# Patient Record
Sex: FEMALE | Race: WHITE | NOT HISPANIC OR LATINO | Employment: PART TIME | URBAN - METROPOLITAN AREA
[De-identification: names, ages, dates, MRNs, and addresses within clinical notes are randomized per-mention and may not be internally consistent; named-entity substitution may affect disease eponyms.]

---

## 2017-06-06 ENCOUNTER — GENERIC CONVERSION - ENCOUNTER (OUTPATIENT)
Dept: OTHER | Facility: OTHER | Age: 27
End: 2017-06-06

## 2017-07-18 ENCOUNTER — ALLSCRIPTS OFFICE VISIT (OUTPATIENT)
Dept: OTHER | Facility: OTHER | Age: 27
End: 2017-07-18

## 2017-08-08 ENCOUNTER — TRANSCRIBE ORDERS (OUTPATIENT)
Dept: LAB | Facility: HOSPITAL | Age: 27
End: 2017-08-08

## 2017-08-08 ENCOUNTER — APPOINTMENT (OUTPATIENT)
Dept: LAB | Facility: HOSPITAL | Age: 27
End: 2017-08-08
Payer: COMMERCIAL

## 2017-08-08 DIAGNOSIS — Z00.8 HEALTH EXAMINATION IN POPULATION SURVEY: ICD-10-CM

## 2017-08-08 DIAGNOSIS — Z00.8 HEALTH EXAMINATION IN POPULATION SURVEY: Primary | ICD-10-CM

## 2017-08-08 LAB
CHOLEST SERPL-MCNC: 181 MG/DL (ref 50–200)
EST. AVERAGE GLUCOSE BLD GHB EST-MCNC: 108 MG/DL
HBA1C MFR BLD: 5.4 % (ref 4.2–6.3)
HDLC SERPL-MCNC: 53 MG/DL (ref 40–60)
LDLC SERPL CALC-MCNC: 117 MG/DL (ref 0–100)
TRIGL SERPL-MCNC: 56 MG/DL

## 2017-08-08 PROCEDURE — 80061 LIPID PANEL: CPT

## 2017-08-08 PROCEDURE — 83036 HEMOGLOBIN GLYCOSYLATED A1C: CPT

## 2017-08-08 PROCEDURE — 36415 COLL VENOUS BLD VENIPUNCTURE: CPT

## 2018-01-10 NOTE — MISCELLANEOUS
Message   Recorded as Task   Date: 10/07/2016 02:04 PM, Created By: Tyler Upton   Task Name: Call Back   Assigned To: Sarah Steward   Regarding Patient: Elizabeth Adams, Status: In Progress   Comment:    Ale Lehman - 07 Oct 2016 2:04 PM     TASK CREATED  PT CALLED C/O RT BREAST MASTITIS SX  [phone]   Irina Weems - 07 Oct 2016 2:05 PM     TASK IN [facility] - 07 Oct 2016 2:47 PM     TASK EDITED                 pt c/o of having cold like sx since wed, had fever alst night of 101, both her children did have fevers/colds as well  states her right breast started hurting last night while is currently breast feeding, has redness and warm to touch under breast near nipple area  no fever today, consulted with CG on call, rx'd dicloxicillin sodium 500mg 4xs daily x 10 days  called into pharmacy  pt made aware  Active Problems    1  Abnormal glucose in pregnancy, antepartum (648 83) (O99 810)   2  Breech presentation, fetus 1 (652 20) (O32 1XX1)   3  Dermoid cyst of left ovary (220) (D27 1)   4  Elevated serum alkaline phosphatase level (790 5) (R74 8)   5  Nonpurulent mastitis associated with puerperium (675 24) (O91 22)   6  Ovarian cyst affecting pregnancy, antepartum (499 66,500 5) (O34 80,N83 209)   7  Pregnancy (V22 2) (Z33 1)   8  Ultrasound for  screening for fetal growth restriction (V28 4) (Z36)    Current Meds   1  Dicloxacillin Sodium 500 MG Oral Capsule; TAKE 1 CAPSULE 4 TIMES DAILY; Therapy: 29WNU2588 to (Evaluate:36Obs0748)  Requested for: 20PQD5210 Recorded   2  Prenatal Vitamins TABS; TAKE 1 TABLET DAILY; Therapy: (Recorded:25Csv9797) to Recorded    Allergies    1  No Known Drug Allergies    2   Seasonal    Signatures   Electronically signed by : Danny Casanova LPN; Oct  7 1137  2:54LQ EST                       (Author)

## 2018-01-10 NOTE — PROGRESS NOTES
2016         RE: Lenward Bowels                                 To: [facility]  MR#: [id number]                                   [address]   : [address] [facility]                                  [address]   ENC: [id number] Montano, [address]   (Exam #: [id number])                           Fax: [phone]      The LMP of this 30year old,  3, para 2 patient was SEP 10 2015,   giving her an NIKOS of 2016 and a current gestational age of 24 weeks   5 days by dates  A sonographic examination was performed on 2016   using real time equipment  The ultrasound examination was performed using   abdominal & vaginal techniques  The patient has a BMI of 24 0  Her blood   pressure today was 108/65  Earliest ultrasound found in her record: 12/02/15 11w2d 16 NIKOS      Problem list   1  First trimester abnormal uterine artery Doppler flow studies that   resolved by the second trimester  2  Possible left ovarian dermoid noted at 20 weeks        Cardiac motion was observed at 149 bpm       INDICATIONS      fetal anatomical survey   abnormal uterine Doppler   asthma      Exam Types      LEVEL II   Transvaginal      RESULTS      Fetus # 1 of 1   Transverse presentation   Fetal growth appeared normal   Placenta Location = Posterior   No placenta previa   Placenta Grade = I      MEASUREMENTS (* Included In Average GA)      OFD              5 8 cm   AC              13 7 cm        18 weeks 6 days* (34%)   BPD              4 3 cm        19 weeks 0 days* (34%)   HC              16 1 cm        18 weeks 6 days* (23%)   Femur            3 0 cm        19 weeks 2 days* (32%)      Nuchal Fold      4 0 mm      Humerus          2 9 cm        19 weeks 2 days  (43%)   Radius           2 5 cm        20 weeks 1 day   Ulna             2 8 cm        20 weeks 1 day   Tibia            2 7 cm        19 weeks 6 days  (49%)   Fibula           2 7 cm 18 weeks 6 days   Foot             3 1 cm        19 weeks 4 days      Cerebellum       2 0 cm        20 weeks 0 days   Biorbit          2 9 cm        18 weeks 5 days   CisternaMagna    3 6 mm      HC/AC           1 18   FL/AC           0 22   FL/BPD          0 69   EFW (Ac/Fl/Hc)   272 grams - 0 lbs 10 oz      THE AVERAGE GESTATIONAL AGE is 19 weeks 0 days +/- 10 days  AMNIOTIC FLUID      Largest Vertical Pocket = 4 4 cm   Amniotic Fluid: Normal      UTERINE ARTERIES                                  S/D   PI    RI    NOTCH       Left Uterine Artery        2 25  0 91  0 55   No       Right Uterine Artery       2 53  1 03  0 61   No      ANATOMY      Head                                    Normal   Face/Neck                               Normal   Th  Cav  Normal   Heart                                   Normal   Abd  Cav  Normal   Stomach                                 Normal   Right Kidney                            Normal   Left Kidney                             Normal   Bladder                                 Normal   Abd  Wall                               Normal   Spine                                   Normal   Extrems                                 Normal   Genitalia                               Normal   Placenta                                Normal   Umbl  Cord                              Normal   Uterus                                  Normal      ANATOMY DETAILS      Visualized Appearing Sonographically Normal:   HEAD: (Calvarium, BPD Level, Lateral Ventricles, Choroid Plexus,   Cerebellum, Cisterna Magna);    FACE/NECK: (Neck, Nuchal Fold, Profile,   Orbits, Nose/Lips, Palate, Face);    TH  CAV : (Diaphragm); HEART:   (Four Chamber View, Proximal Left Outflow, Proximal Right Outflow, Aortic   Arch, Ductal Arch, Short Axis of Greater Vessels, Cardiac Axis,   Interventricular Septum, Interatrial Septum, Cardiac Position);    ABD  CAV , STOMACH, RIGHT KIDNEY, LEFT KIDNEY, BLADDER, ABD  WALL, SPINE:   (Cervical Spine, Thoracic Spine, Lumbar Spine, Sacrum);    EXTREMS: (Lt   Humerus, Rt Humerus, Lt Forearm, Rt Forearm, Lt Hand, Rt Hand, Lt Femur,   Rt Femur, Lt Low Leg, Rt Low Leg, Lt Foot, Rt Foot);    GENITALIA,   PLACENTA, UMBL  CORD, UTERUS      ANATOMY COMMENTS      No fetal structural abnormality or ultrasound marker for aneuploidy is   identified on the Level II ultrasound study today  The genitalia were   reviewed and found to be  female  The patient is aware of the results of   the fetal sex  Fetal growth and amniotic fluid volume appear normal   The   maternal uterine artery Doppler study is normal   Active movement of the   fetal body & extremities was seen  There is no suspicion of a   subchorionic bleed  The placental cord insertion was normal   The cervix   seen by transvaginal scan appears normal measuring 39mms  There is no   evidence for spontaneous funneling of the cervix seen  ADNEXA      The left ovary appeared normal and measured 3 3 x 2 1 x 1 7 cm with a   volume of 6 2 cc  There is a 7 mm echogenic area within the left ovary and   which may be a small dermoid  The right ovary appeared normal and measured   3 7 x 3 1 x 2 0 cm with a volume of 12 0 cc  IMPRESSION      Osuna IUP   19 weeks and 0 days by this ultrasound  (NIKOS=JUN 21 2016)   Transverse presentation   Fetal growth appeared normal   Normal anatomy survey   Regular fetal heart rate of 149 bpm   Posterior placenta   No placenta previa      GENERAL COMMENT      The patient was informed of the findings and counseled about the   limitations of the exam in detecting all forms of fetal congenital   abnormalities  She denies any vaginal bleeding or uterine cramping/contractions  She does   feel fetal movement  Her sequential screen was normal with a one in 10,000   risk for [facility] and trisomy 18 and a one in 6000 risk for neural tube   defects  Exam shows she is comfortable and her abdomen is non tender  Follow up recommended:   1  Recommend a growth scan at 32 weeks and will review that left   ovary/possible dermoid at that time  2  She can stop her baby aspirin as her uterine Dopplers have normalized  3  She reports today that she has not required the use of an inhaler since   her last pregnancy that ended in August of 2014  SPENCER Drummond M D  Maternal-Fetal Medicine   Electronically signed 01/26/16 20:33           Electronically signed by:Danyel ALEMAN    Jan 27 2016 12:16PM EST

## 2018-01-11 NOTE — PROGRESS NOTES
Assessment    1  Dysuria (788 1) (R30 0)    Plan  Dysuria    · Sulfamethoxazole-Trimethoprim 800-160 MG Oral Tablet; TAKE 1 TABLET TWICE  DAILY WITH FOOD    Discussion/Summary    Follow-up with PCP in 1-2 days if symptoms not improving for evaluation and urinalysis  Possible side effects of new medications were reviewed with the patient/guardian today  The treatment plan was reviewed with the patient/guardian  The patient/guardian understands and agrees with the treatment plan      History of Present Illness  Patient with four-day history of dysuria, urgency, frequency  She has mild nausea, no vomiting no fever no chills  She feels achy  No back pain or abdominal pain  No medication allergies      Review of Systems    Constitutional: No fever, no chills, feels well, no tiredness, no recent weight gain or loss  Gastrointestinal: no complaints of abdominal pain, no constipation, no nausea or diarrhea, no vomiting, no bloody stools  Genitourinary: as noted in HPI  Musculoskeletal: no complaints of arthralgia, no myalgia, no joint swelling or stiffness, no limb pain or swelling  Active Problems    1  Abnormal glucose in pregnancy, antepartum (648 83) (O99 810)   2  Breech presentation, fetus 1 (652 20) (O32 1XX1)   3  Dermoid cyst of left ovary (220) (D27 1)   4  Elevated serum alkaline phosphatase level (790 5) (R74 8)   5  Nonpurulent mastitis associated with puerperium (675 24) (O91 22)   6  Ovarian cyst affecting pregnancy, antepartum (824 60,478 7) (O34 80,N83 209)   7  Pregnancy (V22 2) (Z33 1)   8  Ultrasound for  screening for fetal growth restriction (V28 4) (Z36)    Past Medical History    1  History of Assisted single delivery (V27 0) (Z37 0)   2  History of Asthma (493 90) (J45 909)   3  History of  3   4  History of anorexia nervosa (V11 8) (Z86 59)   5  History of nummular eczema (V13 3) (Z87 2)   6  History of varicella (V12 09) (Z86 19)   7   History of Osteoporosis (733 00) (M81 0)   8  History of Previously Pregnant With 2  Normal Vaginal Deliveries    Family History  Mother    1  Family history of Uterine Cancer (V16 49)  Father    2  Family history of Prostate Cancer (V16 42)  Maternal Grandmother    3  Family history of Diabetes Mellitus (V18 0)   4  Family history of Stroke Syndrome (V17 1)  Paternal Grandfather    5  Family history of congenital heart disease (V19 5) (Z82 79)   6  Denied: Family history of Stroke Syndrome    Social History    · Being A Social Drinker   · Coffee Consumption Recently Decreased   · Daily Coffee Consumption (4  Cups/Day)   · Denied: History of Drug Use   · Exercise Habits   · Marital History - Currently    · Never A Smoker   · Stopped Drinking Alcohol   · Uses Safety Equipment - Seatbelts    Surgical History    1  History of Denial Of Any Significant Medical History   2  History of Oral Surgery Tooth Extraction    Current Meds   1  Dicloxacillin Sodium 500 MG Oral Capsule; TAKE 1 CAPSULE 4 TIMES DAILY; Therapy: 18NTC3692 to (Evaluate:46Wqv8507)  Requested for: 72HIB5076 Recorded   2  Prenatal Vitamins TABS; TAKE 1 TABLET DAILY; Therapy: (Recorded:95Qtb9115) to Recorded    Allergies    1  No Known Drug Allergies    2  Seasonal    Observations Made  No acute distress, nontoxic appearing      Future Appointments    Date/Time Provider Specialty Site   07/13/2017 10:00 AM SUZY Lezama   Obstetrics/Gynecology [facility]     Signatures   Electronically signed by : Aureliano Hayden DO; Jun 6 2017 10:50AM EST                       (Author)

## 2018-01-12 VITALS
WEIGHT: 132 LBS | BODY MASS INDEX: 21.21 KG/M2 | HEIGHT: 66 IN | SYSTOLIC BLOOD PRESSURE: 108 MMHG | DIASTOLIC BLOOD PRESSURE: 62 MMHG

## 2018-01-12 NOTE — MISCELLANEOUS
Message   Recorded as Task   Date: 08/16/2016 03:07 PM, Created By: Natalie Jackman   Task Name: Call Back   Assigned To: Alex Brody   Regarding Patient: Sarah Palafox, Status: In Progress   Comment:    Melanie Lewis - 16 Aug 2016 3:07 PM     TASK CREATED  pt called 6 wks pp and wants results fron pelvic u/s  Romayne Hakim - 59 Aug 2016 3:08 PM     TASK IN PROGRESS   Romayne Hakim - 16 Aug 2016 3:13 PM     TASK EDITED                 results given to pt, stated she had light spotting still, advised to observe to be expected  Signatures   Electronically signed by : Eufemia Lopez LPN;  Aug 16 2016  3:13PM EST                       (Author)

## 2018-01-12 NOTE — MISCELLANEOUS
Message  Pt left message regarding bill [place] received [facility] [place] from genetic screening she had performed at [facility]  Will give info to JC Latham RN  She will contact human resources regarding if genetic testing was included in hospital insurance for new year  Active Problems    1  Dermoid cyst of left ovary (220) (D27 1)   2  Elevated serum alkaline phosphatase level (790 5) (R74 8)   3  Pregnancy (V22 2) (Z33 1)   4  URTI (acute upper respiratory infection) (465 9) (J06 9)    Current Meds   1  Prenatal Vitamins TABS; TAKE 1 TABLET DAILY; Therapy: (Recorded:76Ujt9906) to Recorded    Allergies    1  No Known Drug Allergies    2   Seasonal    Signatures   Electronically signed by : Nilson Brar RN; Feb 1 2016  9:18AM EST                       (Author)

## 2018-01-12 NOTE — MISCELLANEOUS
Message   Recorded as Task  Date: 04/11/2016 09:17 AM, Created By: Teja Matos  Task Name: Medical Complaint Callback  Assigned To: Briana Sandoval  Regarding Patient: Deborah Jarrell, Status: In Progress  Comment:   Teja Matos - 11 Apr 2016 9:17 AM    TASK CREATED  Caller: Self; Medical Complaint; [phone] (Home); [phone] x,,,,, (Work)  Patient left message that she just fell walking out of work  She is 30 weeks  Melanie Lewis - 11 Apr 2016 9:32 AM    TASK IN PROGRESS  Melanie Lewis - 11 Apr 2016 10:01 AM    TASK REPLIED TO: Previously Assigned To [facility],Team  p/c to PT she states turned her RT ankle in the parking lot and fell on her RT side  PT states her lower back and ankle are painful  PT denies CxT, lof or CxT  PT does have +FM  I paged Dr Estrellita Leggett and PT is to go L&D for triage  PT and unit aware  Active Problems    1  Abnormal glucose in pregnancy, antepartum (648 83) (O99 810)   2  Dermoid cyst of left ovary (220) (D27 1)   3  Elevated serum alkaline phosphatase level (790 5) (R74 8)   4  Pregnancy (V22 2) (Z33 1)   5  URTI (acute upper respiratory infection) (465 9) (J06 9)    Current Meds   1  Prenatal Vitamins TABS; TAKE 1 TABLET DAILY; Therapy: (Recorded:28Rva8842) to Recorded    Allergies    1  No Known Drug Allergies    2   Seasonal    Signatures   Electronically signed by : Campos Montalvo, ; Apr 11 2016 10:02AM EST                       (Author)

## 2018-01-13 NOTE — MISCELLANEOUS
Message  Message Free Text Note Form: Spoke with patient via phone  Has symptoms of mastitis again with redness, fever to 102  Denies breast mass  Feels she may be irritating breast with pump and has started to pump in preparation for return to work  Will callif not improving in 1-2 days  She is PA-C and aware of signs of worsening infection or abscess  Plan    1   Dicloxacillin Sodium 500 MG Oral Capsule; TAKE 1 CAPSULE 4 TIMES DAILY    Signatures   Electronically signed by : Lazarus Mora, M D ; Sep  5 2016  9:32PM EST                       (Author)

## 2018-01-14 NOTE — PROGRESS NOTES
2016         RE: Imtiaz Mendiola                                 To: [facility]  MR#: [id number]                                   [address]   : [address] [facility]                                  [address]   ENC: [id number]                             [place], [address]   (Exam #: [id number])                           Fax: [phone]      The LMP of this 30year old,  3, para 2 patient was SEP 10 2015,   giving her an NIKOS of 2016 and a current gestational age of 34 weeks   5 days by dates  A sonographic examination was performed on 2016   using real time equipment  The ultrasound examination was performed using   abdominal technique  The patient has a BMI of 25 8  Her blood pressure   today was 117/67  Earliest ultrasound found in her record: 12/02/15 11w2d 16 NIKOS            Myrtice Drivers reports regular fetal movements and denies problems related to   hypertension, gestational diabetes,  labor, or vaginal bleeding  Cardiac motion was observed at 133 bpm       INDICATIONS      fetal growth      Exam Types      Level I      RESULTS      Fetus # 1 of 1   Vertex presentation   Fetal growth appeared normal   Placenta Location = Posterior, left lateral   No placenta previa   Placenta Grade = I      MEASUREMENTS (* Included In Average GA)      AC              27 0 cm        31 weeks 1 day * (37%)   BPD              8 1 cm        32 weeks 5 days* (59%)   HC              29 0 cm        31 weeks 4 days* (31%)   Femur            6 2 cm        32 weeks 1 day * (56%)      Humerus          5 5 cm        31 weeks 6 days  (54%)      Cerebellum       4 2 cm        34 weeks 2 days      HC/AC           1 08   FL/AC           0 23   FL/BPD          0 77   EFW (Ac/Fl/Hc)  1795 grams - 3 lbs 15 oz                 (43%)      THE AVERAGE GESTATIONAL AGE is 31 weeks 6 days +/- 18 days        AMNIOTIC FLUID      Q1: 3 4      Q2: 2 9      Q3: 6 3      Q4: 3 7 JOHN Total = 16 2 cm   Amniotic Fluid: Normal      BIOPHYSICAL PROFILE      The Biophysical Profile score was 8/8  Breathin  Movement: 2  Tone: 2  AFV: 2      IMPRESSION      Osuna IUP   31 weeks and 6 days by this ultrasound  (NIKOS=HOWARD 15 2016)   Vertex presentation   Fetal growth appeared normal   Regular fetal heart rate of 133 bpm   Posterior, left lateral placenta   No placenta previa      GENERAL COMMENT      No fetal structural abnormality is identified on the Level I survey today  The fetal brain, heart including four-chamber and septal views, stomach,   kidneys, bladder, three vessel cord, diaphragm, spine, and face appear   normal   Fetal interval growth and amniotic fluid volume are normal  We   cannot image the maternal ovaries well  Today's ultrasound findings and suggested follow-up were discussed in   detail with Zhanna  Daily third trimester fetal kick counting was   discussed at the visit today  No further appointment has been scheduled in   the [address] [place] [address] at this time  Follow-up [facility] ultrasound   evaluation is recommended as clinically indicated  Ultrasound evaluation   to assess her ovaries should be considered at some point after delivery   given suspicion earlier in this pregnancy of a possible left dermoid cyst       The face to face time, in addition to time spent discussing ultrasound   results, was 10 minutes, greater than 50% of which was spent during   counseling and coordination of care  SPENCER Aguayo M D     Maternal-Fetal Medicine   Electronically signed 16 09:36

## 2018-01-15 NOTE — MISCELLANEOUS
Message   Recorded as Task   Date: 03/24/2016 07:49 AM, Created By: Megan Rosales   Task Name: Go to Result   Assigned To: Megan Rosales   Regarding Patient: Vick Dean, Status: In Progress   Comment:    VivianasDee Dee - 24 Mar 2016 7:49 AM     TASK CREATED  1hr GTT abnormal  Please check 3hr GTT  Thanks! Melanie Lewis - 24 Mar 2016 8:04 AM     TASK IN PROGRESS   Melanie Lewis - 24 Mar 2016 10:16 AM     TASK REPLIED TO: Previously Assigned To [facility],Team  p/c to pt aware  Rx for 3 hr GTT faxed to [facility]  Pt given instructions  Active Problems    1  Abnormal glucose in pregnancy, antepartum (648 83) (O99 810)   2  Dermoid cyst of left ovary (220) (D27 1)   3  Elevated serum alkaline phosphatase level (790 5) (R74 8)   4  Pregnancy (V22 2) (Z33 1)   5  URTI (acute upper respiratory infection) (465 9) (J06 9)    Current Meds   1  Amoxicillin 500 MG Oral Capsule; TAKE 1 CAPSULE 3 TIMES DAILY UNTIL GONE;   Therapy: 34POR8209 to (Complete:28Mar2016)  Requested for: 86DED6058; Last   Rx:18Mar2016 Ordered   2  Prenatal Vitamins TABS; TAKE 1 TABLET DAILY; Therapy: (Recorded:68Rik2811) to Recorded    Allergies    1  No Known Drug Allergies    2  Seasonal    Plan  Abnormal glucose in pregnancy, antepartum, Pregnancy    · (1) GLUCOSE TOLERANCE TEST, 3HR; Status:Active - Retrospective Authorization;   Requested for:24Mar2016;     Signatures   Electronically signed by : Scott Ronquillo, ; Mar 24 2016 10:16AM EST                       (Author)

## 2018-01-16 NOTE — MISCELLANEOUS
Message  Patient called with update, she spoke with Dr Radha Caro over the weekend with mastitis, he put her on dicloxicillin and she is feeling much better  Active Problems    1  Abnormal glucose in pregnancy, antepartum (648 83) (O99 810)   2  Breech presentation, fetus 1 (652 20) (O32 1XX1)   3  Dermoid cyst of left ovary (220) (D27 1)   4  Elevated serum alkaline phosphatase level (790 5) (R74 8)   5  Nonpurulent mastitis associated with puerperium (675 24) (O91 22)   6  Ovarian cyst affecting pregnancy, antepartum (421 11,933 9) (O34 80,N83 20)   7  Pregnancy (V22 2) (Z33 1)   8  Ultrasound for  screening for fetal growth restriction (V28 4) (Z36)    Current Meds   1  Dicloxacillin Sodium 500 MG Oral Capsule; TAKE 1 CAPSULE 4 TIMES DAILY; Therapy: 66ZPX1286 to (Evaluate:11Xzx9314)  Requested for: 02Abi2066; Last   Rx:35Lsk5387 Ordered   2  Prenatal Vitamins TABS; TAKE 1 TABLET DAILY; Therapy: (Recorded:60Hjm7792) to Recorded    Allergies    1  No Known Drug Allergies    2   Seasonal    Signatures   Electronically signed by : Alicia Posey, ; 2016  3:40PM EST                       (Author)

## 2018-01-17 NOTE — RESULT NOTES
Message   Recorded as Task   Date: 09/07/2016 09:59 AM, Created By: Yoana Mares   Task Name: Call Back   Assigned To: Salazar Murcia   Regarding Patient: Leeann Sanchez, Status: In Progress   Comment:    Ale Lehman - 07 Sep 2016 9:59 AM     TASK CREATED  PT CALLED C/O SHE IS 11 WKS PP TODAY AND IS HAVING BLEEDING  SHE HAS QUESTIONS  [address] Sep 2016 10:49 AM     TASK REASSIGNED: Previously Assigned To [facility],Team  LMOM for pt to call back   [address] Sep 2016 10:51 AM     TASK IN PROGRESS   Darian Cardenas - 07 Sep 2016 10:56 AM     TASK EDITED  Pt bf full time  Yesterday had spotting and some bleeding today - small clot  Told pt to do hpt   If neg, just gn2vnsre bleeding        Signatures   Electronically signed by :  Georgiana Aleman, ; Sep  7 2016 10:57AM EST                       (Author)

## 2018-01-18 NOTE — MISCELLANEOUS
Message  Return to work or school:   Deanna Eid is under my professional care  She was seen in my office on 1/16/16   She is able to return to work on  1/18/16            Future Appointments    Signatures   Electronically signed by :  Cheng Osullivan, [facility]; Jan 16 2016  7:50PM EST                       (Author)

## 2018-03-21 ENCOUNTER — OFFICE VISIT (OUTPATIENT)
Dept: OBGYN CLINIC | Facility: CLINIC | Age: 28
End: 2018-03-21
Payer: COMMERCIAL

## 2018-03-21 VITALS
SYSTOLIC BLOOD PRESSURE: 108 MMHG | WEIGHT: 134 LBS | BODY MASS INDEX: 22.33 KG/M2 | HEIGHT: 65 IN | DIASTOLIC BLOOD PRESSURE: 72 MMHG

## 2018-03-21 DIAGNOSIS — N91.2 AMENORRHEA: Primary | ICD-10-CM

## 2018-03-21 PROCEDURE — 76801 OB US < 14 WKS SINGLE FETUS: CPT | Performed by: PHYSICIAN ASSISTANT

## 2018-03-21 NOTE — PROGRESS NOTES
Early OB Ultrasound Procedure Note  Sebastian Angel  YOB: 1990      Referring Physician: Paco Cortez MD     Technician: Study performed by the interpreting physician assistant    Indications:  amenorrhea     Patient's last menstrual period was 01/29/2018 , , with EGA of  7 weeks and 2   days    Patient denies vaginal bleeding or brown discharge      Procedure Details  A gestational sac is seen containing a yolk sac and a bay embryo  The embryonic crown-rump length measures 0 98 cm  and calculates to an estimated gestational age of 9 weeks and 0   Days  Embryonic cardiac activity is  present @ 141 bpm   Description of fetal anatomy Normal   Description of ovaries: normal   Description of uterus: normal    Findings:  Viable, bya intrauterine pregnancy at 7 weeks,  2 days, with a final EDC of November 5, 2018 calculated by LMP    Problem List Items Addressed This Visit     Amenorrhea - Primary     (+) viable IUP with CRL consistent with LMP dates  Final NIKOS of 11/5/18  Patient congratulated, questions answered    RTO 2 weeks for PN interview and 4-5 weeks for PN1

## 2018-03-21 NOTE — ASSESSMENT & PLAN NOTE
(+) viable IUP with CRL consistent with LMP dates  Final NIKOS of 11/5/18  Patient congratulated, questions answered    RTO 2 weeks for PN interview and 4-5 weeks for PN1

## 2018-04-11 ENCOUNTER — INITIAL PRENATAL (OUTPATIENT)
Dept: OBGYN CLINIC | Facility: CLINIC | Age: 28
End: 2018-04-11

## 2018-04-11 VITALS
RESPIRATION RATE: 16 BRPM | DIASTOLIC BLOOD PRESSURE: 62 MMHG | BODY MASS INDEX: 22.66 KG/M2 | SYSTOLIC BLOOD PRESSURE: 96 MMHG | WEIGHT: 136.2 LBS

## 2018-04-11 DIAGNOSIS — Z34.81 PRENATAL CARE, SUBSEQUENT PREGNANCY, FIRST TRIMESTER: Primary | ICD-10-CM

## 2018-04-11 PROCEDURE — OBC: Performed by: OBSTETRICS & GYNECOLOGY

## 2018-04-11 NOTE — PROGRESS NOTES
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

## 2018-04-21 ENCOUNTER — APPOINTMENT (OUTPATIENT)
Dept: LAB | Facility: CLINIC | Age: 28
End: 2018-04-21
Payer: COMMERCIAL

## 2018-04-21 DIAGNOSIS — Z34.81 PRENATAL CARE, SUBSEQUENT PREGNANCY, FIRST TRIMESTER: ICD-10-CM

## 2018-04-21 LAB
BACTERIA UR QL AUTO: ABNORMAL /HPF
BASOPHILS # BLD AUTO: 0.02 THOUSANDS/ΜL (ref 0–0.1)
BASOPHILS NFR BLD AUTO: 0 % (ref 0–1)
BILIRUB UR QL STRIP: NEGATIVE
CLARITY UR: CLEAR
COLOR UR: YELLOW
EOSINOPHIL # BLD AUTO: 0.19 THOUSAND/ΜL (ref 0–0.61)
EOSINOPHIL NFR BLD AUTO: 4 % (ref 0–6)
ERYTHROCYTE [DISTWIDTH] IN BLOOD BY AUTOMATED COUNT: 14.4 % (ref 11.6–15.1)
GLUCOSE UR STRIP-MCNC: NEGATIVE MG/DL
HBV SURFACE AG SER QL: NORMAL
HCT VFR BLD AUTO: 34.8 % (ref 34.8–46.1)
HGB BLD-MCNC: 11.6 G/DL (ref 11.5–15.4)
HGB UR QL STRIP.AUTO: NEGATIVE
KETONES UR STRIP-MCNC: NEGATIVE MG/DL
LEUKOCYTE ESTERASE UR QL STRIP: NEGATIVE
LYMPHOCYTES # BLD AUTO: 1.58 THOUSANDS/ΜL (ref 0.6–4.47)
LYMPHOCYTES NFR BLD AUTO: 32 % (ref 14–44)
MCH RBC QN AUTO: 27.6 PG (ref 26.8–34.3)
MCHC RBC AUTO-ENTMCNC: 33.3 G/DL (ref 31.4–37.4)
MCV RBC AUTO: 83 FL (ref 82–98)
MONOCYTES # BLD AUTO: 0.29 THOUSAND/ΜL (ref 0.17–1.22)
MONOCYTES NFR BLD AUTO: 6 % (ref 4–12)
MUCOUS THREADS UR QL AUTO: ABNORMAL
NEUTROPHILS # BLD AUTO: 2.92 THOUSANDS/ΜL (ref 1.85–7.62)
NEUTS SEG NFR BLD AUTO: 58 % (ref 43–75)
NITRITE UR QL STRIP: NEGATIVE
NON-SQ EPI CELLS URNS QL MICRO: ABNORMAL /HPF
PH UR STRIP.AUTO: 7 [PH] (ref 4.5–8)
PLATELET # BLD AUTO: 255 THOUSANDS/UL (ref 149–390)
PMV BLD AUTO: 10.2 FL (ref 8.9–12.7)
PROT UR STRIP-MCNC: NEGATIVE MG/DL
RBC # BLD AUTO: 4.21 MILLION/UL (ref 3.81–5.12)
RBC #/AREA URNS AUTO: ABNORMAL /HPF
RUBV IGG SERPL IA-ACNC: >175 IU/ML
SP GR UR STRIP.AUTO: 1.02 (ref 1–1.03)
UROBILINOGEN UR QL STRIP.AUTO: 0.2 E.U./DL
WBC # BLD AUTO: 5 THOUSAND/UL (ref 4.31–10.16)
WBC #/AREA URNS AUTO: ABNORMAL /HPF

## 2018-04-21 PROCEDURE — 86592 SYPHILIS TEST NON-TREP QUAL: CPT

## 2018-04-21 PROCEDURE — 81001 URINALYSIS AUTO W/SCOPE: CPT

## 2018-04-21 PROCEDURE — 86762 RUBELLA ANTIBODY: CPT

## 2018-04-21 PROCEDURE — 85025 COMPLETE CBC W/AUTO DIFF WBC: CPT

## 2018-04-21 PROCEDURE — 36415 COLL VENOUS BLD VENIPUNCTURE: CPT

## 2018-04-21 PROCEDURE — 87389 HIV-1 AG W/HIV-1&-2 AB AG IA: CPT

## 2018-04-21 PROCEDURE — 87340 HEPATITIS B SURFACE AG IA: CPT

## 2018-04-23 LAB
HIV 1+2 AB+HIV1 P24 AG SERPL QL IA: NORMAL
RPR SER QL: NORMAL

## 2018-04-24 ENCOUNTER — INITIAL PRENATAL (OUTPATIENT)
Dept: OBGYN CLINIC | Facility: CLINIC | Age: 28
End: 2018-04-24

## 2018-04-24 VITALS — DIASTOLIC BLOOD PRESSURE: 64 MMHG | WEIGHT: 135 LBS | BODY MASS INDEX: 22.47 KG/M2 | SYSTOLIC BLOOD PRESSURE: 106 MMHG

## 2018-04-24 DIAGNOSIS — Z11.3 SCREENING FOR STD (SEXUALLY TRANSMITTED DISEASE): Primary | ICD-10-CM

## 2018-04-24 PROBLEM — Z34.81 ENCOUNTER FOR SUPERVISION OF NORMAL PREGNANCY IN MULTIGRAVIDA IN FIRST TRIMESTER: Status: ACTIVE | Noted: 2018-03-21

## 2018-04-24 PROCEDURE — 87591 N.GONORRHOEAE DNA AMP PROB: CPT | Performed by: OBSTETRICS & GYNECOLOGY

## 2018-04-24 PROCEDURE — PNV: Performed by: OBSTETRICS & GYNECOLOGY

## 2018-04-24 PROCEDURE — 87491 CHLMYD TRACH DNA AMP PROBE: CPT | Performed by: OBSTETRICS & GYNECOLOGY

## 2018-04-24 NOTE — PROGRESS NOTES
First prenatal visit  Pap up to date 4/13/2015-negative -HPV  Cultures only  Sequential screening scheduled

## 2018-04-25 LAB
CHLAMYDIA DNA CVX QL NAA+PROBE: NORMAL
N GONORRHOEA DNA GENITAL QL NAA+PROBE: NORMAL

## 2018-05-01 ENCOUNTER — ROUTINE PRENATAL (OUTPATIENT)
Dept: PERINATAL CARE | Facility: CLINIC | Age: 28
End: 2018-05-01
Payer: COMMERCIAL

## 2018-05-01 VITALS
HEIGHT: 65 IN | DIASTOLIC BLOOD PRESSURE: 63 MMHG | HEART RATE: 76 BPM | WEIGHT: 136.1 LBS | BODY MASS INDEX: 22.67 KG/M2 | SYSTOLIC BLOOD PRESSURE: 99 MMHG

## 2018-05-01 DIAGNOSIS — Z36.82 ENCOUNTER FOR ANTENATAL SCREENING FOR NUCHAL TRANSLUCENCY: Primary | ICD-10-CM

## 2018-05-01 DIAGNOSIS — Z3A.13 13 WEEKS GESTATION OF PREGNANCY: ICD-10-CM

## 2018-05-01 DIAGNOSIS — Z34.81 PRENATAL CARE, SUBSEQUENT PREGNANCY, FIRST TRIMESTER: ICD-10-CM

## 2018-05-01 PROCEDURE — 99212 OFFICE O/P EST SF 10 MIN: CPT | Performed by: OBSTETRICS & GYNECOLOGY

## 2018-05-01 PROCEDURE — 76813 OB US NUCHAL MEAS 1 GEST: CPT | Performed by: OBSTETRICS & GYNECOLOGY

## 2018-05-01 NOTE — LETTER
May 1, 2018     [address] [place] [address]    Patient: Chacne Haddad   YOB: 1990   Date of Visit: 5/1/2018       Dear Dr Rowley Levels: Thank you for referring Ricardo Garland to me for evaluation  Below are my notes for this consultation  If you have questions, please do not hesitate to call me  I look forward to following your patient along with you  Sincerely,        Krystle Vernon MD        CC: No Recipients  Krystle Vernon MD  5/1/2018  9:46 AM  Sign at close encounter  Please refer to the [facility] ultrasound report in Ob Procedures for additional information regarding the visit to the [address] [place] [address]

## 2018-05-01 NOTE — PROGRESS NOTES
Please refer to the [facility] ultrasound report in Ob Procedures for additional information regarding the visit to the [facility], [facility]  today

## 2018-05-14 ENCOUNTER — TELEPHONE (OUTPATIENT)
Dept: PERINATAL CARE | Facility: CLINIC | Age: 28
End: 2018-05-14

## 2018-05-22 ENCOUNTER — ROUTINE PRENATAL (OUTPATIENT)
Dept: OBGYN CLINIC | Facility: CLINIC | Age: 28
End: 2018-05-22

## 2018-05-22 VITALS — BODY MASS INDEX: 23.13 KG/M2 | DIASTOLIC BLOOD PRESSURE: 64 MMHG | WEIGHT: 139 LBS | SYSTOLIC BLOOD PRESSURE: 100 MMHG

## 2018-05-22 DIAGNOSIS — Z34.81 ENCOUNTER FOR SUPERVISION OF NORMAL PREGNANCY IN MULTIGRAVIDA IN FIRST TRIMESTER: ICD-10-CM

## 2018-05-22 DIAGNOSIS — Z34.82 MULTIGRAVIDA IN SECOND TRIMESTER: ICD-10-CM

## 2018-05-22 PROCEDURE — 87086 URINE CULTURE/COLONY COUNT: CPT | Performed by: PHYSICIAN ASSISTANT

## 2018-05-22 PROCEDURE — PNV: Performed by: PHYSICIAN ASSISTANT

## 2018-05-22 NOTE — PROGRESS NOTES
Problem List Items Addressed This Visit     Multigravida in second trimester     Feels well  No FM yet  Had first part of Sequential, has slip for second part  Working as a hospitalist PA night  Due for Pap - last one in 2015 was run with HPV but she was 24 at the time   Pt requests Pap and HPV once she turns 28 (postpartum)  No urine culture run - will send today  Has level II scheduled  Thinks it's a fourth girl

## 2018-05-23 LAB — BACTERIA UR CULT: NORMAL

## 2018-06-06 ENCOUNTER — LAB (OUTPATIENT)
Dept: LAB | Facility: CLINIC | Age: 28
End: 2018-06-06
Payer: COMMERCIAL

## 2018-06-06 ENCOUNTER — TRANSCRIBE ORDERS (OUTPATIENT)
Dept: LAB | Facility: CLINIC | Age: 28
End: 2018-06-06

## 2018-06-06 DIAGNOSIS — Z3A.18 18 WEEKS GESTATION OF PREGNANCY: ICD-10-CM

## 2018-06-06 DIAGNOSIS — Z3A.18 18 WEEKS GESTATION OF PREGNANCY: Primary | ICD-10-CM

## 2018-06-06 PROCEDURE — 36415 COLL VENOUS BLD VENIPUNCTURE: CPT

## 2018-06-07 LAB — SCAN RESULT: NORMAL

## 2018-06-07 NOTE — PROGRESS NOTES
Please have one of the [facility] physicians review this result  Thanks you

## 2018-06-13 ENCOUNTER — TELEPHONE (OUTPATIENT)
Dept: PERINATAL CARE | Facility: CLINIC | Age: 28
End: 2018-06-13

## 2018-06-19 ENCOUNTER — ROUTINE PRENATAL (OUTPATIENT)
Dept: OBGYN CLINIC | Facility: CLINIC | Age: 28
End: 2018-06-19

## 2018-06-19 ENCOUNTER — ROUTINE PRENATAL (OUTPATIENT)
Dept: PERINATAL CARE | Facility: CLINIC | Age: 28
End: 2018-06-19
Payer: COMMERCIAL

## 2018-06-19 VITALS
DIASTOLIC BLOOD PRESSURE: 68 MMHG | HEIGHT: 65 IN | SYSTOLIC BLOOD PRESSURE: 105 MMHG | BODY MASS INDEX: 23.99 KG/M2 | WEIGHT: 143.96 LBS | HEART RATE: 86 BPM

## 2018-06-19 VITALS — BODY MASS INDEX: 23.96 KG/M2 | WEIGHT: 144 LBS | DIASTOLIC BLOOD PRESSURE: 70 MMHG | SYSTOLIC BLOOD PRESSURE: 100 MMHG

## 2018-06-19 DIAGNOSIS — Z36.3 ENCOUNTER FOR ANTENATAL SCREENING FOR MALFORMATIONS: Primary | ICD-10-CM

## 2018-06-19 DIAGNOSIS — Z34.82 MULTIGRAVIDA IN SECOND TRIMESTER: Primary | ICD-10-CM

## 2018-06-19 DIAGNOSIS — Z36.86 ENCOUNTER FOR ANTENATAL SCREENING FOR CERVICAL LENGTH: ICD-10-CM

## 2018-06-19 DIAGNOSIS — Z3A.20 20 WEEKS GESTATION OF PREGNANCY: ICD-10-CM

## 2018-06-19 PROCEDURE — PNV: Performed by: OBSTETRICS & GYNECOLOGY

## 2018-06-19 PROCEDURE — 76817 TRANSVAGINAL US OBSTETRIC: CPT | Performed by: OBSTETRICS & GYNECOLOGY

## 2018-06-19 PROCEDURE — 99212 OFFICE O/P EST SF 10 MIN: CPT | Performed by: OBSTETRICS & GYNECOLOGY

## 2018-06-19 PROCEDURE — 76805 OB US >/= 14 WKS SNGL FETUS: CPT | Performed by: OBSTETRICS & GYNECOLOGY

## 2018-06-19 NOTE — PROGRESS NOTES
A transvaginal ultrasound was performed  Sonographer note on use of High Level Disinfection Process (Trophon) for transvaginal probe# 2 used, serial [id number]    [address] [facility]

## 2018-06-19 NOTE — PROGRESS NOTES
Please refer to the [facility] ultrasound report in Ob Procedures for additional information regarding the visit to the [facility], [facility]  today

## 2018-06-19 NOTE — LETTER
June 19, 2018     [address] Ganong, [address]    Patient: Layton Whitney   YOB: 1990   Date of Visit: 6/19/2018       Dear Dr Claudette Pea: Thank you for referring June Ross to me for evaluation  Below are my notes for this consultation  If you have questions, please do not hesitate to call me  I look forward to following your patient along with you  Sincerely,        Giuliana Cormier MD        CC: No Recipients  Giuliana Cormier MD  6/19/2018 10:23 AM  Sign at close encounter  Please refer to the [facility] ultrasound report in Ob Procedures for additional information regarding the visit to the [facility]  today

## 2018-06-19 NOTE — PROGRESS NOTES
Derrick Tanisha is doing well  Has level II US this morning- is convinced this will be her 4th girl - will be finding out gender  Unable to void this visit    Birth plan returned - plans  without anesthesia (has done with prior deliveries)

## 2018-07-18 ENCOUNTER — ROUTINE PRENATAL (OUTPATIENT)
Dept: OBGYN CLINIC | Facility: CLINIC | Age: 28
End: 2018-07-18

## 2018-07-18 VITALS — DIASTOLIC BLOOD PRESSURE: 52 MMHG | WEIGHT: 148.8 LBS | BODY MASS INDEX: 24.76 KG/M2 | SYSTOLIC BLOOD PRESSURE: 92 MMHG

## 2018-07-18 DIAGNOSIS — Z34.92 SECOND TRIMESTER PREGNANCY: ICD-10-CM

## 2018-07-18 DIAGNOSIS — Z34.82 MULTIGRAVIDA IN SECOND TRIMESTER: Primary | ICD-10-CM

## 2018-07-18 PROCEDURE — PNV: Performed by: OBSTETRICS & GYNECOLOGY

## 2018-07-18 NOTE — PROGRESS NOTES
Problem List Items Addressed This Visit        E[address] in second trimester     Patient had a normal level 2 ultrasound she is having her 4th girl  No complaints           Other Visit Diagnoses     Second trimester pregnancy    -  Primary    Relevant Orders    CBC    Glucose, 1H PG    RPR

## 2018-08-08 ENCOUNTER — TRANSCRIBE ORDERS (OUTPATIENT)
Dept: LAB | Facility: HOSPITAL | Age: 28
End: 2018-08-08

## 2018-08-08 ENCOUNTER — APPOINTMENT (OUTPATIENT)
Dept: LAB | Facility: HOSPITAL | Age: 28
End: 2018-08-08
Payer: COMMERCIAL

## 2018-08-08 ENCOUNTER — TELEPHONE (OUTPATIENT)
Dept: OBGYN CLINIC | Facility: CLINIC | Age: 28
End: 2018-08-08

## 2018-08-08 DIAGNOSIS — Z00.8 HEALTH EXAMINATION IN POPULATION SURVEY: Primary | ICD-10-CM

## 2018-08-08 DIAGNOSIS — Z00.8 HEALTH EXAMINATION IN POPULATION SURVEY: ICD-10-CM

## 2018-08-08 DIAGNOSIS — R73.09 ABNORMAL GLUCOSE: Primary | ICD-10-CM

## 2018-08-08 LAB
CHOLEST SERPL-MCNC: 330 MG/DL (ref 50–200)
ERYTHROCYTE [DISTWIDTH] IN BLOOD BY AUTOMATED COUNT: 13.2 % (ref 11.6–15.1)
EST. AVERAGE GLUCOSE BLD GHB EST-MCNC: 97 MG/DL
GLUCOSE 1H P 50 G GLC PO SERPL-MCNC: 147 MG/DL
HBA1C MFR BLD: 5 % (ref 4.2–6.3)
HCT VFR BLD AUTO: 30.6 % (ref 34.8–46.1)
HDLC SERPL-MCNC: 51 MG/DL (ref 40–60)
HGB BLD-MCNC: 10 G/DL (ref 11.5–15.4)
LDLC SERPL CALC-MCNC: 252 MG/DL (ref 0–100)
MCH RBC QN AUTO: 28.8 PG (ref 26.8–34.3)
MCHC RBC AUTO-ENTMCNC: 32.7 G/DL (ref 31.4–37.4)
MCV RBC AUTO: 88 FL (ref 82–98)
NONHDLC SERPL-MCNC: 279 MG/DL
PLATELET # BLD AUTO: 237 THOUSANDS/UL (ref 149–390)
PMV BLD AUTO: 10.4 FL (ref 8.9–12.7)
RBC # BLD AUTO: 3.47 MILLION/UL (ref 3.81–5.12)
TRIGL SERPL-MCNC: 134 MG/DL
WBC # BLD AUTO: 7.26 THOUSAND/UL (ref 4.31–10.16)

## 2018-08-08 PROCEDURE — 82950 GLUCOSE TEST: CPT | Performed by: OBSTETRICS & GYNECOLOGY

## 2018-08-08 PROCEDURE — 85027 COMPLETE CBC AUTOMATED: CPT | Performed by: OBSTETRICS & GYNECOLOGY

## 2018-08-08 PROCEDURE — 80061 LIPID PANEL: CPT

## 2018-08-08 PROCEDURE — 83036 HEMOGLOBIN GLYCOSYLATED A1C: CPT

## 2018-08-08 PROCEDURE — 86592 SYPHILIS TEST NON-TREP QUAL: CPT | Performed by: OBSTETRICS & GYNECOLOGY

## 2018-08-08 PROCEDURE — 36415 COLL VENOUS BLD VENIPUNCTURE: CPT | Performed by: OBSTETRICS & GYNECOLOGY

## 2018-08-08 NOTE — TELEPHONE ENCOUNTER
----- Message from Shar Fitch DO sent at 8/8/2018  2:28 PM EDT -----  Please call the patient regarding her abnormal result    Order 3hr GTT and will need additional iron for anemia

## 2018-08-08 NOTE — TELEPHONE ENCOUNTER
Spoke with pt - she is aware she failed her 1 hr glucose and that she needs to go for a 3hr GTT  Explained it was a fasting test and done at 3 locations  Gave locations  Order in chart  She will go next Monday due to being on vacation

## 2018-08-09 LAB — RPR SER QL: NORMAL

## 2018-08-14 ENCOUNTER — ROUTINE PRENATAL (OUTPATIENT)
Dept: OBGYN CLINIC | Facility: CLINIC | Age: 28
End: 2018-08-14

## 2018-08-14 VITALS — DIASTOLIC BLOOD PRESSURE: 82 MMHG | WEIGHT: 154.4 LBS | SYSTOLIC BLOOD PRESSURE: 108 MMHG | BODY MASS INDEX: 25.69 KG/M2

## 2018-08-14 DIAGNOSIS — Z34.82 MULTIGRAVIDA IN SECOND TRIMESTER: ICD-10-CM

## 2018-08-14 PROBLEM — R63.0 ANOREXIA: Status: RESOLVED | Noted: 2018-08-14 | Resolved: 2018-08-14

## 2018-08-14 PROBLEM — B01.9 VARICELLA: Status: RESOLVED | Noted: 2018-08-14 | Resolved: 2018-08-14

## 2018-08-14 PROBLEM — F41.9 ANXIETY: Status: RESOLVED | Noted: 2018-08-14 | Resolved: 2018-08-14

## 2018-08-14 PROBLEM — G43.909 MIGRAINE: Status: RESOLVED | Noted: 2018-08-14 | Resolved: 2018-08-14

## 2018-08-14 PROCEDURE — PNV: Performed by: PHYSICIAN ASSISTANT

## 2018-08-14 NOTE — PROGRESS NOTES
Problem List Items Addressed This Visit     Multigravida in second trimester     Feels well overall  Very fatigued  Working 12 hour night shifts  Has anemia - not on iron - advised to start one po daily  Going for 3hr gtt soon

## 2018-08-14 NOTE — ASSESSMENT & PLAN NOTE
Feels well overall  Very fatigued  Working 12 hour night shifts  Has anemia - not on iron - advised to start one po daily  Going for 3hr gtt soon

## 2018-08-17 ENCOUNTER — LAB (OUTPATIENT)
Dept: LAB | Facility: HOSPITAL | Age: 28
End: 2018-08-17
Attending: OBSTETRICS & GYNECOLOGY
Payer: COMMERCIAL

## 2018-08-17 DIAGNOSIS — R73.09 ABNORMAL GLUCOSE: ICD-10-CM

## 2018-08-17 LAB
GLUCOSE 1H P 100 G GLC PO SERPL-MCNC: 116 MG/DL (ref 65–179)
GLUCOSE 2H P 100 G GLC PO SERPL-MCNC: 121 MG/DL (ref 65–154)
GLUCOSE 3H P 100 G GLC PO SERPL-MCNC: 98 MG/DL (ref 40–500)
GLUCOSE P FAST SERPL-MCNC: 85 MG/DL (ref 65–99)

## 2018-08-17 PROCEDURE — 36415 COLL VENOUS BLD VENIPUNCTURE: CPT

## 2018-08-17 PROCEDURE — 82952 GTT-ADDED SAMPLES: CPT

## 2018-08-17 PROCEDURE — 82951 GLUCOSE TOLERANCE TEST (GTT): CPT

## 2018-08-20 ENCOUNTER — TELEPHONE (OUTPATIENT)
Dept: OBGYN CLINIC | Facility: CLINIC | Age: 28
End: 2018-08-20

## 2018-08-31 ENCOUNTER — ROUTINE PRENATAL (OUTPATIENT)
Dept: OBGYN CLINIC | Facility: CLINIC | Age: 28
End: 2018-08-31
Payer: COMMERCIAL

## 2018-08-31 VITALS — DIASTOLIC BLOOD PRESSURE: 64 MMHG | SYSTOLIC BLOOD PRESSURE: 112 MMHG | WEIGHT: 152.8 LBS | BODY MASS INDEX: 25.43 KG/M2

## 2018-08-31 DIAGNOSIS — Z34.93 ENCOUNTER FOR SUPERVISION OF NORMAL PREGNANCY IN THIRD TRIMESTER, UNSPECIFIED GRAVIDITY: Primary | ICD-10-CM

## 2018-08-31 DIAGNOSIS — O99.810 ABNORMAL GLUCOSE AFFECTING PREGNANCY: ICD-10-CM

## 2018-08-31 DIAGNOSIS — Z23 NEED FOR TETANUS, DIPHTHERIA, AND ACELLULAR PERTUSSIS (TDAP) VACCINE IN PATIENT OF ADOLESCENT AGE OR OLDER: ICD-10-CM

## 2018-08-31 PROCEDURE — 90715 TDAP VACCINE 7 YRS/> IM: CPT

## 2018-08-31 PROCEDURE — PNV: Performed by: NURSE PRACTITIONER

## 2018-08-31 PROCEDURE — 90471 IMMUNIZATION ADMIN: CPT

## 2018-08-31 RX ORDER — PNV NO.95/FERROUS FUM/FOLIC AC 28MG-0.8MG
TABLET ORAL
Status: ON HOLD | COMMUNITY
End: 2018-10-15 | Stop reason: SDDI

## 2018-08-31 NOTE — ASSESSMENT & PLAN NOTE
Denies OB complaints  Good fetal movement  Denies contractions, cramping, leakage of fluid or vaginal bleeding  Tdap was administered today  Baby and Me considerations reinforced  Reviewed  labor precautions and FKCs

## 2018-08-31 NOTE — PROGRESS NOTES
Problem List Items Addressed This Visit     Supervision of normal pregnancy in third trimester - Primary     Denies OB complaints  Good fetal movement  Denies contractions, cramping, leakage of fluid or vaginal bleeding  Tdap was administered today  Baby and Me considerations reinforced  Reviewed  labor precautions and FKCs  Abnormal glucose affecting pregnancy     32 week growth and JOHN recommended  Referral was placed            Relevant Orders    Ambulatory Referral to Maternal Fetal Medicine      Other Visit Diagnoses     Need for tetanus, diphtheria, and acellular pertussis (Tdap) vaccine in patient of adolescent age or older        Relevant Orders    TDAP Vaccine greater than or equal to 8yo (Completed)

## 2018-09-12 ENCOUNTER — ULTRASOUND (OUTPATIENT)
Dept: PERINATAL CARE | Facility: CLINIC | Age: 28
End: 2018-09-12
Payer: COMMERCIAL

## 2018-09-12 ENCOUNTER — TELEPHONE (OUTPATIENT)
Dept: OBGYN CLINIC | Facility: CLINIC | Age: 28
End: 2018-09-12

## 2018-09-12 VITALS
HEART RATE: 84 BPM | BODY MASS INDEX: 26.31 KG/M2 | DIASTOLIC BLOOD PRESSURE: 75 MMHG | HEIGHT: 65 IN | WEIGHT: 157.9 LBS | SYSTOLIC BLOOD PRESSURE: 110 MMHG

## 2018-09-12 DIAGNOSIS — Z34.93 ENCOUNTER FOR SUPERVISION OF NORMAL PREGNANCY IN THIRD TRIMESTER, UNSPECIFIED GRAVIDITY: ICD-10-CM

## 2018-09-12 DIAGNOSIS — Z36.4 ULTRASOUND FOR ANTENATAL SCREENING FOR FETAL GROWTH RESTRICTION: ICD-10-CM

## 2018-09-12 DIAGNOSIS — Z3A.32 32 WEEKS GESTATION OF PREGNANCY: ICD-10-CM

## 2018-09-12 DIAGNOSIS — O99.013 ANEMIA AFFECTING PREGNANCY IN THIRD TRIMESTER: Primary | ICD-10-CM

## 2018-09-12 DIAGNOSIS — Z34.01 ENCOUNTER FOR SUPERVISION OF NORMAL FIRST PREGNANCY IN FIRST TRIMESTER: Primary | ICD-10-CM

## 2018-09-12 PROCEDURE — 76816 OB US FOLLOW-UP PER FETUS: CPT | Performed by: OBSTETRICS & GYNECOLOGY

## 2018-09-12 NOTE — LETTER
September 12, 2018     [address] [place] [address]    Patient: Sandra Honeycutt   YOB: 1990   Date of Visit: 9/12/2018       Dear Dr Farooq Tracey: Thank you for referring Dwayne Marina to me for evaluation  Below are my notes for this consultation  If you have questions, please do not hesitate to call me  I look forward to following your patient along with you  Sincerely,        Chidi Hart MD        CC: No Recipients  Chidi Hart MD  9/12/2018 11:10 AM  Sign at close encounter  Please refer to the [facility] ultrasound report in Ob Procedures for additional information regarding the visit to the [facility]  today

## 2018-09-12 NOTE — PROGRESS NOTES
Please refer to the [facility] ultrasound report in Ob Procedures for additional information regarding the visit to the [facility], [facility]  today

## 2018-09-14 ENCOUNTER — ROUTINE PRENATAL (OUTPATIENT)
Dept: OBGYN CLINIC | Facility: CLINIC | Age: 28
End: 2018-09-14

## 2018-09-14 VITALS — BODY MASS INDEX: 26.03 KG/M2 | DIASTOLIC BLOOD PRESSURE: 60 MMHG | SYSTOLIC BLOOD PRESSURE: 104 MMHG | WEIGHT: 156.4 LBS

## 2018-09-14 DIAGNOSIS — Z34.93 ENCOUNTER FOR SUPERVISION OF NORMAL PREGNANCY IN THIRD TRIMESTER, UNSPECIFIED GRAVIDITY: Primary | ICD-10-CM

## 2018-09-14 DIAGNOSIS — O99.810 ABNORMAL GLUCOSE AFFECTING PREGNANCY: ICD-10-CM

## 2018-09-14 PROCEDURE — PNV: Performed by: NURSE PRACTITIONER

## 2018-09-14 NOTE — ASSESSMENT & PLAN NOTE
Denies OB complaints  Good fetal movement  Denies contractions, cramping, leakage of fluid or vaginal bleeding  S/p tdap  Baby and Me considerations reinforced  Reviewed  labor precautions and FKCs

## 2018-09-14 NOTE — ASSESSMENT & PLAN NOTE
Normal 3hr gtt  32 week growth with EFW 50%, AC 47%, JOHN 17 1  No further growth surveillance indicated per M

## 2018-09-14 NOTE — PROGRESS NOTES
Problem List Items Addressed This Visit     Supervision of normal pregnancy in third trimester - Primary     Denies OB complaints  Good fetal movement  Denies contractions, cramping, leakage of fluid or vaginal bleeding  S/p tdap  Baby and Me considerations reinforced  Reviewed  labor precautions and FKCs  Abnormal glucose affecting pregnancy     Normal 3hr gtt  32 week growth with EFW 50%, AC 47%, JOHN 17 1  No further growth surveillance indicated per MFM  Breech presentation     Breech position on 32 wk US  Reviewed likelihood of spont vert  Will recheck at ~36 weeks

## 2018-09-18 ENCOUNTER — TELEPHONE (OUTPATIENT)
Dept: OBGYN CLINIC | Facility: CLINIC | Age: 28
End: 2018-09-18

## 2018-09-18 NOTE — TELEPHONE ENCOUNTER
Dr Estrellita Leggett responded - stated for pt to drink lots of water, a large glass of prune juice daily, start colace and to have patient come in for an appointment this week  Patient only comes to [facility] - not able to put on Dr Corin Cherry schedule, able to schedule her with [facility] for Thursday  Reiterated to patient what Dr Estrellita Leggett recommended and made appointment

## 2018-09-25 ENCOUNTER — ROUTINE PRENATAL (OUTPATIENT)
Dept: OBGYN CLINIC | Facility: CLINIC | Age: 28
End: 2018-09-25

## 2018-09-25 VITALS — BODY MASS INDEX: 26.43 KG/M2 | DIASTOLIC BLOOD PRESSURE: 56 MMHG | SYSTOLIC BLOOD PRESSURE: 110 MMHG | WEIGHT: 158.8 LBS

## 2018-09-25 DIAGNOSIS — Z34.93 ENCOUNTER FOR SUPERVISION OF NORMAL PREGNANCY IN THIRD TRIMESTER, UNSPECIFIED GRAVIDITY: Primary | ICD-10-CM

## 2018-09-25 PROCEDURE — PNV: Performed by: NURSE PRACTITIONER

## 2018-09-25 NOTE — PROGRESS NOTES
Problem List Items Addressed This Visit     Supervision of normal pregnancy in third trimester - Primary     Denies OB complaints  Good fetal movement  Denies contractions, cramping, leakage of fluid or vaginal bleeding  S/p flu vaccine  Wolf Adams elects to defer flu vaccine until next visit  Baby and Me considerations reinforced  GBS at 36 week visit  Reviewed  labor precautions and FKCs  Breech presentation     Will check position on US at next office visit

## 2018-09-25 NOTE — ASSESSMENT & PLAN NOTE
Denies OB complaints  Good fetal movement  Denies contractions, cramping, leakage of fluid or vaginal bleeding  S/p flu vaccine  Loulou Hatch elects to defer flu vaccine until next visit  Baby and Me considerations reinforced  GBS at 36 week visit  Reviewed  labor precautions and FKCs

## 2018-10-12 ENCOUNTER — ROUTINE PRENATAL (OUTPATIENT)
Dept: OBGYN CLINIC | Facility: CLINIC | Age: 28
End: 2018-10-12
Payer: COMMERCIAL

## 2018-10-12 ENCOUNTER — TELEPHONE (OUTPATIENT)
Dept: PERINATAL CARE | Facility: CLINIC | Age: 28
End: 2018-10-12

## 2018-10-12 VITALS
BODY MASS INDEX: 27.06 KG/M2 | DIASTOLIC BLOOD PRESSURE: 64 MMHG | RESPIRATION RATE: 14 BRPM | SYSTOLIC BLOOD PRESSURE: 100 MMHG | WEIGHT: 162.4 LBS | HEIGHT: 65 IN

## 2018-10-12 DIAGNOSIS — Z3A.36 36 WEEKS GESTATION OF PREGNANCY: Primary | ICD-10-CM

## 2018-10-12 DIAGNOSIS — Z23 FLU VACCINE NEED: ICD-10-CM

## 2018-10-12 PROCEDURE — 90686 IIV4 VACC NO PRSV 0.5 ML IM: CPT

## 2018-10-12 PROCEDURE — PNV: Performed by: OBSTETRICS & GYNECOLOGY

## 2018-10-12 PROCEDURE — 87653 STREP B DNA AMP PROBE: CPT | Performed by: OBSTETRICS & GYNECOLOGY

## 2018-10-12 PROCEDURE — 90471 IMMUNIZATION ADMIN: CPT

## 2018-10-12 NOTE — PROGRESS NOTES
Patient is a 28-year-old female, P3, at 36 weeks and 4 days here for a routine prenatal visit without complaint  Review of systems is positive for fetal movement negative for loss of fluid vaginal bleeding or regular contractions  Breech by limited office ultrasound  Referral to Maternal-Fetal Medicine  Flu vaccine given today  GBS culture performed today

## 2018-10-14 ENCOUNTER — ANESTHESIA EVENT (OUTPATIENT)
Dept: LABOR AND DELIVERY | Facility: HOSPITAL | Age: 28
End: 2018-10-14

## 2018-10-14 LAB — GP B STREP DNA SPEC QL NAA+PROBE: NORMAL

## 2018-10-15 ENCOUNTER — HOSPITAL ENCOUNTER (OUTPATIENT)
Facility: HOSPITAL | Age: 28
Discharge: HOME | End: 2018-10-15
Attending: OBSTETRICS & GYNECOLOGY | Admitting: OBSTETRICS & GYNECOLOGY
Payer: COMMERCIAL

## 2018-10-15 ENCOUNTER — ANESTHESIA (OUTPATIENT)
Dept: LABOR AND DELIVERY | Facility: HOSPITAL | Age: 28
End: 2018-10-15

## 2018-10-15 VITALS
TEMPERATURE: 98.6 F | HEIGHT: 65 IN | HEART RATE: 107 BPM | BODY MASS INDEX: 26.99 KG/M2 | WEIGHT: 162 LBS | DIASTOLIC BLOOD PRESSURE: 71 MMHG | RESPIRATION RATE: 18 BRPM | SYSTOLIC BLOOD PRESSURE: 114 MMHG

## 2018-10-15 PROCEDURE — 99204 OFFICE O/P NEW MOD 45 MIN: CPT

## 2018-10-15 PROCEDURE — 99234 HOSP IP/OBS SM DT SF/LOW 45: CPT | Performed by: OBSTETRICS & GYNECOLOGY

## 2018-10-15 NOTE — ANESTHESIA PREPROCEDURE EVALUATION
Review of Systems/Medical History  Patient summary reviewed  Chart reviewed      Cardiovascular  Negative cardio ROS    Pulmonary  Asthma , well controlled/ stable ,        GI/Hepatic  Negative GI/hepatic ROS          Negative  ROS        Endo/Other  Negative endo/other ROS      GYN  Currently pregnant , Prior pregnancy/OB history : 4 Parity: 3,     Comment: Breech presentation  Hx of  x3     Hematology  Negative hematology ROS      Musculoskeletal  Negative musculoskeletal ROS        Neurology  Negative neurology ROS      Psychology   Anxiety,              Physical Exam    Airway    Mallampati score: II  TM Distance: >3 FB  Neck ROM: full     Dental   No notable dental hx     Cardiovascular  Comment: Negative ROS, Rhythm: regular, Rate: normal, Cardiovascular exam normal    Pulmonary  Pulmonary exam normal Breath sounds clear to auscultation,     Other Findings        Anesthesia Plan  ASA Score- 2     Anesthesia Type- epidural and spinal with ASA Monitors  Additional Monitors:   Airway Plan:         Plan Factors-    Induction- intravenous  Postoperative Plan-     Informed Consent- Anesthetic plan and risks discussed with patient

## 2018-10-15 NOTE — H&P
History & Physical - OB/GYN   Daniella Roblero 25 y o  female MRN: [id number]  Unit/Bed#: LD PACU-03 Encounter: [id number]    9 y o  Angelito Beard at 37w0d weeks by LMP  She is a patient of [address]    Chief complaint:  My baby is breech    HPI:  Contractions:  no  Fetal movement:  yes  Vaginal bleeding:   no  Leaking of fluid:  no      Pregnancy Complications:  Patient Active Problem List   Diagnosis    Asthma    Supervision of normal pregnancy in third trimester    Abnormal glucose affecting pregnancy    Breech presentation       PMH:  Past Medical History:   Diagnosis Date    Anorexia     resolved    Anxiety     Asthma     Migraine     As Child/Resolved    Normal delivery     2013 daughter, 200 daughter, 2016 daughter    Varicella     As child       PSH:  Past Surgical History:   Procedure Laterality Date    MYRINGOTOMY W/ TUBES      WISDOM TOOTH EXTRACTION         OB Hx:  Obstetric History       T3      L3     SAB0   TAB0   Ectopic0   Multiple0   Live Births3       # Outcome Date GA Lbr Dawit/2nd Weight Sex Delivery Anes PTL Lv   4 Current            3 Term 16 40w6d / 00:14 3890 g (8 lb 9 2 oz) F Vag-Spont None N FLORIAN      Name: Get Horton:  8                Apgar5: 9   2 Term 14 38w5d  3600 g (7 lb 15 oz) F Vag-Spont None N FLORIAN      Name: Melodie Grate   1 Term 13 39w0d  3685 g (8 lb 2 oz) F Vag-Spont None N FLORIAN      Name: Elpidio Erm:  No current facility-administered medications on file prior to encounter  Current Outpatient Prescriptions on File Prior to Encounter   Medication Sig Dispense Refill    albuterol (PROVENTIL HFA,VENTOLIN HFA) 90 mcg/act inhaler Inhale 2 puffs every 6 (six) hours as needed for wheezing      Ferrous Sulfate (IRON) 325 (65 Fe) MG TABS Take by mouth      Prenatal Multivit-Min-Fe-FA (PRENATAL VITAMINS PO) Prenatal Vitamins TABS TAKE 1 TABLET DAILY    Refills: 0   , M D ; Active         Allergies:  No Known Allergies    Labs:  Blood type: A+  Antibody: negative  Group B strep: negative  HIV: negative  Hepatitis B: negative  RPR: Nonreactive  Rubella: Immune  Varicella Unknown  1 hour Glucose: 147  3 hour Glucose: 116, 98, 121, 85    Physical Exam:  LMP 01/29/2018   [id number]    Gen: AaOx3, NAD, pleasant  Card: RRR, no m/r/g  Pulm: CTAB  Abd: Gravid, nontender  Extremities: No edema, nontender, Negative Jayashree's bilaterally      Estimated Fetal Weight: 6 5 lbs  Presentation: Vertex, confirmed via ultrasound    SVE: Not indicated  FHT:  Reactive, reassuring  145bpm, moderate variability, + 15x15 accelerations, -decelerations  Kenvir: Rare    Membranes: Intact    Assessment:   25 y o  [id number] at 37w0d weeks, scheduled for external cephalic version, found to be vertex upon presentation via ultrasound    Plan:   1  Pt to follow up in outpatient setting for routine prenatal care  2  Signs and symptoms of labor reviewed with pt with precautions to return    Discussed with Dr Izabela Lomeli DO  OB/GYN, PGY3  10/19/2018, 12:49 PM

## 2018-10-18 ENCOUNTER — ROUTINE PRENATAL (OUTPATIENT)
Dept: OBGYN CLINIC | Facility: CLINIC | Age: 28
End: 2018-10-18

## 2018-10-18 VITALS — DIASTOLIC BLOOD PRESSURE: 70 MMHG | WEIGHT: 162 LBS | SYSTOLIC BLOOD PRESSURE: 118 MMHG | BODY MASS INDEX: 26.96 KG/M2

## 2018-10-18 DIAGNOSIS — Z34.93 ENCOUNTER FOR SUPERVISION OF NORMAL PREGNANCY IN THIRD TRIMESTER, UNSPECIFIED GRAVIDITY: Primary | ICD-10-CM

## 2018-10-18 PROCEDURE — PNV: Performed by: OBSTETRICS & GYNECOLOGY

## 2018-10-18 NOTE — PROGRESS NOTES
Good fetal movement  Only concern is position of the baby - reassured still vertex  Discussed version and induction if becomes unstable lie  However, reassured that if still vertex, likely will stay

## 2018-10-24 ENCOUNTER — ROUTINE PRENATAL (OUTPATIENT)
Dept: OBGYN CLINIC | Facility: CLINIC | Age: 28
End: 2018-10-24

## 2018-10-24 VITALS — DIASTOLIC BLOOD PRESSURE: 70 MMHG | BODY MASS INDEX: 27.46 KG/M2 | SYSTOLIC BLOOD PRESSURE: 110 MMHG | WEIGHT: 165 LBS

## 2018-10-24 DIAGNOSIS — Z34.83 ENCOUNTER FOR SUPERVISION OF OTHER NORMAL PREGNANCY IN THIRD TRIMESTER: Primary | ICD-10-CM

## 2018-10-24 PROCEDURE — PNV: Performed by: OBSTETRICS & GYNECOLOGY

## 2018-10-24 NOTE — PROGRESS NOTES
Patient aware -GBS  Last few weeks of pregnancy paper given  Patient had Flu and Tdap shots  Baby moving well  Not sure if she will want induction yet - hoping to avoid but not sure she wants to go post-dates  Will let us know next visit

## 2018-10-31 ENCOUNTER — ROUTINE PRENATAL (OUTPATIENT)
Dept: OBGYN CLINIC | Facility: CLINIC | Age: 28
End: 2018-10-31

## 2018-10-31 VITALS — SYSTOLIC BLOOD PRESSURE: 118 MMHG | DIASTOLIC BLOOD PRESSURE: 68 MMHG | WEIGHT: 167.4 LBS | BODY MASS INDEX: 27.86 KG/M2

## 2018-10-31 DIAGNOSIS — Z3A.39 39 WEEKS GESTATION OF PREGNANCY: Primary | ICD-10-CM

## 2018-10-31 PROCEDURE — PNV: Performed by: OBSTETRICS & GYNECOLOGY

## 2018-10-31 NOTE — PROGRESS NOTES
Patient is tired, worked 12 hour shift last night  GBS neg, has had her flu and Tdap  No consistent contractions, some cramping, no leaking of fluid, having good fetal movement

## 2018-10-31 NOTE — PROGRESS NOTES
Problem List Items Addressed This Visit        Other    39 weeks gestation of pregnancy - Primary     Feels well  No problems  Labor precautions reviewed

## 2018-11-05 ENCOUNTER — ROUTINE PRENATAL (OUTPATIENT)
Dept: OBGYN CLINIC | Facility: CLINIC | Age: 28
End: 2018-11-05

## 2018-11-05 VITALS — WEIGHT: 163.4 LBS | DIASTOLIC BLOOD PRESSURE: 68 MMHG | BODY MASS INDEX: 27.19 KG/M2 | SYSTOLIC BLOOD PRESSURE: 102 MMHG

## 2018-11-05 DIAGNOSIS — Z3A.40 40 WEEKS GESTATION OF PREGNANCY: Primary | ICD-10-CM

## 2018-11-05 PROCEDURE — PNV: Performed by: OBSTETRICS & GYNECOLOGY

## 2018-11-05 NOTE — PROGRESS NOTES
Patient having sharp groin pain, a lot of tightening through the night, baby very active the past few hours

## 2018-11-05 NOTE — PROGRESS NOTES
Problem List Items Addressed This Visit        Other    40 weeks gestation of pregnancy - Primary     Feels well  Some ctx  Desires IOL  Pit/AROM scheduled 11/7/18 0700    Labor precautions

## 2018-11-07 ENCOUNTER — HOSPITAL ENCOUNTER (INPATIENT)
Facility: HOSPITAL | Age: 28
LOS: 1 days | Discharge: HOME | End: 2018-11-08
Attending: OBSTETRICS & GYNECOLOGY | Admitting: OBSTETRICS & GYNECOLOGY
Payer: COMMERCIAL

## 2018-11-07 DIAGNOSIS — Z3A.40 40 WEEKS GESTATION OF PREGNANCY: Primary | ICD-10-CM

## 2018-11-07 LAB
ABO GROUP BLD: NORMAL
BASE EXCESS BLDCOA CALC-SCNC: -6.3 MMOL/L (ref 3–11)
BASE EXCESS BLDCOV CALC-SCNC: -4.8 MMOL/L (ref 1–9)
BLD GP AB SCN SERPL QL: NEGATIVE
ERYTHROCYTE [DISTWIDTH] IN BLOOD BY AUTOMATED COUNT: 15.3 % (ref 11.6–15.1)
HCO3 BLDCOA-SCNC: 21.2 MMOL/L (ref 17.3–27.3)
HCO3 BLDCOV-SCNC: 20.6 MMOL/L (ref 12.2–28.6)
HCT VFR BLD AUTO: 32 % (ref 34.8–46.1)
HGB BLD-MCNC: 10 G/DL (ref 11.5–15.4)
MCH RBC QN AUTO: 25.4 PG (ref 26.8–34.3)
MCHC RBC AUTO-ENTMCNC: 31.3 G/DL (ref 31.4–37.4)
MCV RBC AUTO: 81 FL (ref 82–98)
O2 CT VFR BLDCOA CALC: 7.2 ML/DL
OXYHGB MFR BLDCOA: 38.8 %
OXYHGB MFR BLDCOV: 62.7 %
PCO2 BLDCOA: 50.6 MM[HG] (ref 30–60)
PCO2 BLDCOV: 39.3 MM HG (ref 27–43)
PH BLDCOA: 7.24 [PH] (ref 7.23–7.43)
PH BLDCOV: 7.34 [PH] (ref 7.19–7.49)
PLATELET # BLD AUTO: 264 THOUSANDS/UL (ref 149–390)
PMV BLD AUTO: 10.9 FL (ref 8.9–12.7)
PO2 BLDCOA: 21.5 MM HG (ref 5–25)
PO2 BLDCOV: 29.4 MM HG (ref 15–45)
RBC # BLD AUTO: 3.94 MILLION/UL (ref 3.81–5.12)
RH BLD: POSITIVE
SAO2 % BLDCOV: 11.7 ML/DL
SPECIMEN EXPIRATION DATE: NORMAL
WBC # BLD AUTO: 9.15 THOUSAND/UL (ref 4.31–10.16)

## 2018-11-07 PROCEDURE — 86592 SYPHILIS TEST NON-TREP QUAL: CPT | Performed by: OBSTETRICS & GYNECOLOGY

## 2018-11-07 PROCEDURE — 10S0XZZ REPOSITION PRODUCTS OF CONCEPTION, EXTERNAL APPROACH: ICD-10-PCS | Performed by: OBSTETRICS & GYNECOLOGY

## 2018-11-07 PROCEDURE — 86900 BLOOD TYPING SEROLOGIC ABO: CPT | Performed by: OBSTETRICS & GYNECOLOGY

## 2018-11-07 PROCEDURE — 85027 COMPLETE CBC AUTOMATED: CPT | Performed by: OBSTETRICS & GYNECOLOGY

## 2018-11-07 PROCEDURE — 82805 BLOOD GASES W/O2 SATURATION: CPT | Performed by: OBSTETRICS & GYNECOLOGY

## 2018-11-07 PROCEDURE — 10907ZC DRAINAGE OF AMNIOTIC FLUID, THERAPEUTIC FROM PRODUCTS OF CONCEPTION, VIA NATURAL OR ARTIFICIAL OPENING: ICD-10-PCS | Performed by: OBSTETRICS & GYNECOLOGY

## 2018-11-07 PROCEDURE — 3E033VJ INTRODUCTION OF OTHER HORMONE INTO PERIPHERAL VEIN, PERCUTANEOUS APPROACH: ICD-10-PCS | Performed by: OBSTETRICS & GYNECOLOGY

## 2018-11-07 PROCEDURE — 86901 BLOOD TYPING SEROLOGIC RH(D): CPT | Performed by: OBSTETRICS & GYNECOLOGY

## 2018-11-07 PROCEDURE — 59400 OBSTETRICAL CARE: CPT | Performed by: OBSTETRICS & GYNECOLOGY

## 2018-11-07 PROCEDURE — 86850 RBC ANTIBODY SCREEN: CPT | Performed by: OBSTETRICS & GYNECOLOGY

## 2018-11-07 RX ORDER — OXYTOCIN/RINGER'S LACTATE 30/500 ML
1-30 PLASTIC BAG, INJECTION (ML) INTRAVENOUS
Status: DISCONTINUED | OUTPATIENT
Start: 2018-11-07 | End: 2018-11-07

## 2018-11-07 RX ORDER — OXYCODONE HYDROCHLORIDE AND ACETAMINOPHEN 5; 325 MG/1; MG/1
1 TABLET ORAL EVERY 4 HOURS PRN
Status: DISCONTINUED | OUTPATIENT
Start: 2018-11-07 | End: 2018-11-08 | Stop reason: HOSPADM

## 2018-11-07 RX ORDER — TERBUTALINE SULFATE 1 MG/ML
INJECTION, SOLUTION SUBCUTANEOUS
Status: COMPLETED
Start: 2018-11-07 | End: 2018-11-07

## 2018-11-07 RX ORDER — CALCIUM CARBONATE 200(500)MG
1000 TABLET,CHEWABLE ORAL DAILY PRN
Status: DISCONTINUED | OUTPATIENT
Start: 2018-11-07 | End: 2018-11-08 | Stop reason: HOSPADM

## 2018-11-07 RX ORDER — OXYCODONE HYDROCHLORIDE AND ACETAMINOPHEN 5; 325 MG/1; MG/1
2 TABLET ORAL EVERY 4 HOURS PRN
Status: DISCONTINUED | OUTPATIENT
Start: 2018-11-07 | End: 2018-11-08 | Stop reason: HOSPADM

## 2018-11-07 RX ORDER — ACETAMINOPHEN 325 MG/1
650 TABLET ORAL EVERY 4 HOURS PRN
Status: DISCONTINUED | OUTPATIENT
Start: 2018-11-07 | End: 2018-11-08 | Stop reason: HOSPADM

## 2018-11-07 RX ORDER — DOCUSATE SODIUM 100 MG/1
100 CAPSULE, LIQUID FILLED ORAL 2 TIMES DAILY
Status: DISCONTINUED | OUTPATIENT
Start: 2018-11-07 | End: 2018-11-08 | Stop reason: HOSPADM

## 2018-11-07 RX ORDER — OXYTOCIN/RINGER'S LACTATE 30/500 ML
1-30 PLASTIC BAG, INJECTION (ML) INTRAVENOUS CONTINUOUS
Status: ACTIVE | OUTPATIENT
Start: 2018-11-07 | End: 2018-11-07

## 2018-11-07 RX ORDER — IBUPROFEN 600 MG/1
600 TABLET ORAL EVERY 6 HOURS PRN
Status: DISCONTINUED | OUTPATIENT
Start: 2018-11-07 | End: 2018-11-08 | Stop reason: HOSPADM

## 2018-11-07 RX ORDER — DIAPER,BRIEF,INFANT-TODD,DISP
1 EACH MISCELLANEOUS 4 TIMES DAILY PRN
Status: DISCONTINUED | OUTPATIENT
Start: 2018-11-07 | End: 2018-11-08 | Stop reason: HOSPADM

## 2018-11-07 RX ORDER — LIDOCAINE HYDROCHLORIDE 10 MG/ML
INJECTION, SOLUTION EPIDURAL; INFILTRATION; INTRACAUDAL; PERINEURAL
Status: DISCONTINUED
Start: 2018-11-07 | End: 2018-11-07 | Stop reason: WASHOUT

## 2018-11-07 RX ORDER — ECHINACEA PURPUREA EXTRACT 125 MG
1 TABLET ORAL AS NEEDED
Status: DISCONTINUED | OUTPATIENT
Start: 2018-11-07 | End: 2018-11-08 | Stop reason: HOSPADM

## 2018-11-07 RX ORDER — ALBUTEROL SULFATE 90 UG/1
2 AEROSOL, METERED RESPIRATORY (INHALATION) EVERY 6 HOURS PRN
Status: DISCONTINUED | OUTPATIENT
Start: 2018-11-07 | End: 2018-11-08 | Stop reason: HOSPADM

## 2018-11-07 RX ORDER — SODIUM CHLORIDE, SODIUM LACTATE, POTASSIUM CHLORIDE, CALCIUM CHLORIDE 600; 310; 30; 20 MG/100ML; MG/100ML; MG/100ML; MG/100ML
125 INJECTION, SOLUTION INTRAVENOUS CONTINUOUS
Status: DISCONTINUED | OUTPATIENT
Start: 2018-11-07 | End: 2018-11-07

## 2018-11-07 RX ADMIN — SODIUM CHLORIDE, SODIUM LACTATE, POTASSIUM CHLORIDE, AND CALCIUM CHLORIDE 125 ML/HR: .6; .31; .03; .02 INJECTION, SOLUTION INTRAVENOUS at 11:40

## 2018-11-07 RX ADMIN — Medication 2 MILLI-UNITS/MIN: at 08:56

## 2018-11-07 RX ADMIN — TERBUTALINE SULFATE 0.25 MG: 1 INJECTION, SOLUTION SUBCUTANEOUS at 14:08

## 2018-11-07 RX ADMIN — BENZOCAINE AND LEVOMENTHOL: 200; 5 SPRAY TOPICAL at 19:08

## 2018-11-07 RX ADMIN — SODIUM CHLORIDE, SODIUM LACTATE, POTASSIUM CHLORIDE, AND CALCIUM CHLORIDE 125 ML/HR: .6; .31; .03; .02 INJECTION, SOLUTION INTRAVENOUS at 08:38

## 2018-11-07 RX ADMIN — Medication 250 MILLI-UNITS/MIN: at 17:22

## 2018-11-07 RX ADMIN — Medication 1 SPRAY: at 21:15

## 2018-11-07 RX ADMIN — Medication 2 MILLI-UNITS/MIN: at 16:00

## 2018-11-07 NOTE — OB LABOR/OXYTOCIN SAFETY PROGRESS
Labor Progress Note - Edilberto Telles 25 y o  female MRN: [id number]    Unit/Bed#: -01 Encounter: [id number]    Obstetric History       T3      L3     SAB0   TAB0   Ectopic0   Multiple0   Live Births3      Gestational Age: 41w4d  Dose (vega-units/min) Oxytocin: 0 vega-units/min (Fetus breech presetation-attempt version)  Contraction Frequency (minutes): 2 5-3 5  Contraction Quality: Moderate  Tachysystole: No   Dilation: 6        Effacement (%): 70  Station: -2  Baseline Rate: 145 bpm  Fetal Heart Rate: 160 BPM  FHR Category: Category I     Oxytocin Safety Progress Check: Safety check completed    Notes/comments:      Patient feeling more pressure  Fetal vertex well applied  No significant cervical change, and contractions starting to space out, will restart pitocin at 2          Torsten Darling MD 2018 3:55 PM

## 2018-11-07 NOTE — PROCEDURES
Terrial Records, a [id number] at 40w2d with an NIKOS of 11/5/2018, by Last Menstrual Period, was seen at [address] [facility] for the following procedure(s):  External Cephalic Version    Pre Op: elective induction of labor, found to be in oblique presentation following induction    Post Op: Vertex presentation    Attending: Dr Luis Angel Aguilar    Resident: Dr Marta Cui, Dr Jaimie Clinton    Anesthesia: None    Procedure: The patient was brought to Labor and Delivery where a reactive fetal heart tracing was obtained and pitocin was initiated for elective induction of labor  Pt was initially found to be in vertex presentation  Pt was reexamined and later found to be in oblique lie, confirmed via ultrasound, with the fetal vertex in the patient's LLQ and the fetal buttock in the RUQ  There was noted to be adequate fluid  The patient was noted to have contractions  She was given 25mg subcutaneous terbutalin  Using manual pressure, the fetal vertex was manipulated in a forward roll fashion until a vertex presentation was obtained  Fetal heart tones were checked intermittently during the procedure and were noted to be reassuring  Following successful external cephalic version, the patient was placed on continuous external fetal monitoring and she underwent artificial rupture of membranes  She tolerated the procedure well  Plan is to wait approximately 20 minutes, reevaluate the fetal heart tracing, and reinitiate pitocin titration  Dr Luis Angel Aguilar present for all of the above        Marta Cui, DO  OB/GYN, PGY3  11/7/2018, 2:27 PM

## 2018-11-07 NOTE — H&P
H&P Exam - Obstetrics   Sintia Berger 25 y o  female MRN: [id number]  Unit/Bed#: -01 Encounter: [id number]    Assessment/Plan     Assessment:  30yo  @ 40 2wks for elective IOL    Plan:  Pitocin IOL  Pain control up request, has never had epidural previously    History of Present Illness   Chief Complaint: elective IOL    HPI:  Sintia Berger is a 25 y o   female with an NIKOS of 2018, by Last Menstrual Period at 40w2d weeks gestation who is being admitted for elective IOL  Her current obstetrical history is significant for n/a  Contractions: None  Leakage of fluid: None  Bleeding: None  Fetal movement: present  Pregnancy complications: none  Review of Systems    Historical Information   OB History    Para Term  AB Living   4 3 3 0 0 3   SAB TAB Ectopic Multiple Live Births   0 0 0 0 3      # Outcome Date GA Lbr Dawit/2nd Weight Sex Delivery Anes PTL Lv   4 Current            3 Term 16 40w6d / 00:14 3890 g (8 lb 9 2 oz) F Vag-Spont None N FLORIAN      Birth Comments: Healthy Baby girl   2 Term 14 38w5d  3600 g (7 lb 15 oz) F Vag-Spont None N FLORIAN      Birth Comments: Healthy Baby   1 Term 13 39w0d  3685 g (8 lb 2 oz) F Vag-Spont None N FLORIAN      Birth Comments: Healthy Baby        Baby complications/comments: none known, it's a girl!   Past Medical History:   Diagnosis Date    Anorexia     resolved    Anxiety     Asthma     Migraine     As Child/Resolved    Normal delivery     2013 daughter, 2014 daughter, 2016 daughter    Varicella     As child     Past Surgical History:   Procedure Laterality Date    MYRINGOTOMY W/ TUBES      WISDOM TOOTH EXTRACTION       Social History   History   Alcohol Use    Yes     Comment: social     History   Drug Use No     History   Smoking Status    Never Smoker   Smokeless Tobacco    Never Used     Family History: non-contributory    Meds/Allergies   all medications and allergies reviewed  No Known Allergies    Objective Vitals: Blood pressure 121/66, pulse (!) 122, temperature 98 2 °F (36 8 °C), temperature source Oral, resp  rate 18, height 5' 5" (1 651 m), weight 73 9 kg (163 lb), last menstrual period 01/29/2018, currently breastfeeding  Body mass index is 27 12 kg/m²  Invasive Devices     Peripheral Intravenous Line            Peripheral IV 11/07/18 Right Arm less than 1 day                Physical Exam   Constitutional: She is oriented to person, place, and time  She appears well-developed and well-nourished  No distress  Cardiovascular: Normal rate and regular rhythm  No murmur heard  Pulmonary/Chest: Effort normal  No respiratory distress  Abdominal: Soft  There is no tenderness  Genitourinary: Vagina normal  No vaginal discharge found  Genitourinary Comments: SVE 3-4/60/-2   Neurological: She is alert and oriented to person, place, and time  Skin: Skin is warm and dry  Psychiatric: She has a normal mood and affect  Vitals reviewed  Prenatal Labs: I have personally reviewed pertinent reports  , Blood Type:   Lab Results   Component Value Date/Time    ABO Grouping A 11/07/2018 08:35 AM    ABO Grouping A 11/23/2015 09:49 AM     , D (Rh type):   Lab Results   Component Value Date/Time    Rh Factor Positive 11/07/2018 08:35 AM    Rh Factor Positive 11/23/2015 09:49 AM     , Antibody Screen:   Lab Results   Component Value Date/Time    Antibody Screen Negative 11/23/2015 09:49 AM    , Platelets:   Lab Results   Component Value Date/Time    Platelets 549 28/51/6884 08:35 AM    Platelets 748 54/41/1975 09:49 AM          Imaging, EKG, Pathology, and Other Studies: I have personally reviewed pertinent reports

## 2018-11-07 NOTE — L&D DELIVERY NOTE
DELIVERY NOTE  Miguel Lee 25 y o  female MRN: [id number]  Unit/Bed#: -01 Encounter: [id number]    Obstetrician:   Sade Tabor    Assistant: Cammy Pavon    Pre-Delivery Diagnosis: IUP at 40w2d                          Post-Delivery Diagnosis: Same as above - Delivered             Viable female                  Procedure: Spontaneous vaginal delivery               Indications for instrumental delivery: none    Estimated Blood Loss:  200     Anesthesia:  none           Complications:  None    Brief history/labor course:  Admitted for elective IOL  Responded well to Pitocin  Became oblique lie during labor  Underwent successful ECV  Progressed well afterward  Description of Delivery: Patient delivered a viable Female  over intact perineum and/or none laceration  A nuchal cord was no noted   With the assistance of maternal expulsive efforts and downward traction of fetal head, the anterior shoulder was delivered without difficulty, followed by the remainder of the infant's body  After delivery of the , delayed cord clamping was accomplished  The umbilical cord was doubly clamped and cut and the  was passed off to  staff for routine care  Umbilical cord blood and umbilical artery and venous gases were collected  Placenta was delivered with fundal massage and gentle traction on the cord with active management of the third stage of labor  Placenta delivered intact with a 3-vessel cord  Active management of the third stage of labor was undertaken with IV pitocin  Bleeding was noted to be under control  Inspection of the vulva, perineum, vagina and cervix revealed no lacerations  Hemostasis was noted to be excellent  Art pH:  7 241  BE:  -6 3   Justin pH:  7 337  BE:  -4 8    Mother and baby are currently recovering nicely in stable condition             Attending Attestation: I was present for the entire procedure

## 2018-11-07 NOTE — DISCHARGE INSTRUCTIONS
Vaginal Delivery   WHAT YOU NEED TO KNOW:   A vaginal delivery occurs when your baby is born through your vagina (birth canal)  DISCHARGE INSTRUCTIONS:   Seek care immediately if:   · Your leg feels warm, tender, and painful  It may look swollen and red  · You have a fever  · You are urinating very little, or not at all  · You have heavy vaginal bleeding that fills 1 or more sanitary pads in 1 hour  · You feel weak, dizzy, or faint  Contact your healthcare provider if:   · Your abdominal or perineal pain does not go away, or gets worse  · You feel depressed  · You have questions or concerns about your condition or care  Medicines:  · NSAIDs , such as ibuprofen, help decrease swelling, pain, and fever  This medicine is available with or without a doctor's order  NSAIDs can cause stomach bleeding or kidney problems in certain people  If you take blood thinner medicine, always ask your healthcare provider if NSAIDs are safe for you  Always read the medicine label and follow directions  · Stool softeners  make it easier for you to have a bowel movement  You may need this medicine to treat or prevent constipation  · Take your medicine as directed  Contact your healthcare provider if you think your medicine is not helping or if you have side effects  Tell him or her if you are allergic to any medicine  Keep a list of the medicines, vitamins, and herbs you take  Include the amounts, and when and why you take them  Bring the list or the pill bottles to follow-up visits  Carry your medicine list with you in case of an emergency  Follow up with your healthcare provider:  Most women need to return 6 weeks after a vaginal delivery  Ask your healthcare provider how to care for your wounds or stitches, if you have them  Write down your questions so you remember to ask them during your visits  Activity:  Rest as much as possible  Try to keep all activities short   You may be able to do some exercise soon after you have your baby  Talk with your healthcare provider before you start exercising  If you work outside the home, ask when you can return to your job  Kegel exercises:  Kegel exercises may help your vaginal and rectal muscles heal faster  You can do Kegel exercises by tightening and relaxing the muscles around your vagina  Kegel exercises help make the muscles stronger  Breast care:  When your milk comes in, your breasts may feel full and hard  Ask how to care for your breasts, even if you are not breastfeeding  Constipation:  You may have constipation for a period of time after you have your baby  Do not try to push the bowel movement out if it is too hard  High-fiber foods and extra liquids can help you prevent constipation  Examples of high-fiber foods are fruit and bran  Prune juice and water are good liquids to drink  You may also be told to take over-the-counter fiber and stool softener medicines  Take these items as directed  Ask how to prevent or treat hemorrhoids  Perineum care: Your perineum is the area between your vagina and anus  Keep the area clean and dry  This will help it heal and prevent infection  Wash the area gently with soap and water when you bathe or shower  Rinse your perineum with warm water after you urinate or have a bowel movement  Your healthcare provider may suggest you use a warm sitz bath to help decrease pain  To take a sitz bath, fill a bathtub with 4 to 6 inches of warm water  You may also use a sitz bath pan that fits inside the toilet  Sit in the sitz bath for 20 minutes  Do this 2 to 3 times a day, or as directed  The warm water can help decrease pain and swelling  Vaginal discharge: You will have vaginal discharge, called lochia, after your delivery  The lochia is red or dark brown with clots for 1 to 3 days after the birth  The amount will decrease and turn pale pink or brown for 3 to 10 days  It will turn white or yellow on the 10th or 14th day  Lochia is usually gone within 3 weeks  Use a sanitary pad rather than a tampon to prevent a vaginal infection  You will have lochia for up to 3 weeks after your baby is born  Monthly periods: Your period may start again within 7 to 9 weeks after your baby is born  If you are breastfeeding, it may take longer for your period to start again  You can still get pregnant again even though you do not have your monthly period  Talk with your healthcare provider about a birth control method if you do not want to get pregnant  Mood changes: Many new mothers have some kind of mood changes after delivery  Some of these changes occur because of lack of sleep, hormone changes, and caring for a new baby  Some mood changes can be more serious, such as postpartum depression  Talk with your healthcare provider if you feel unable to care for yourself or your baby  Sexual activity:  Do not have sex until your healthcare provider says it is okay  You may notice you have a decreased desire for sex, or sex may be painful  You may need to use a vaginal lubricant (gel) to help make sex more comfortable  © 2017 [facility] Information is for End User's use only and may not be sold, redistributed or otherwise used for commercial purposes  All illustrations and images included in CareNotes® are the copyrighted property of A D A M , Inc  or Pierre Elder  The above information is an  only  It is not intended as medical advice for individual conditions or treatments  Talk to your doctor, nurse or pharmacist before following any medical regimen to see if it is safe and effective for you

## 2018-11-07 NOTE — OB LABOR/OXYTOCIN SAFETY PROGRESS
Oxytocin Safety Progress Check Note - Vane Kelley 25 y o  female MRN: [id number]    Unit/Bed#: -01 Encounter: [id number]    Obstetric History       T3      L3     SAB0   TAB0   Ectopic0   Multiple0   Live Births3      Gestational Age: 41w4d  Dose (vega-units/min) Oxytocin: 12 vega-units/min  Contraction Frequency (minutes): 2 5-3  Contraction Quality: Moderate  Tachysystole: No   Dilation: 6        Effacement (%): 70  Station: -2  Baseline Rate: 125 bpm  Fetal Heart Rate: 120 BPM  FHR Category: Category I     Oxytocin Safety Progress Check: Safety check completed    Notes/comments:      Late Entry - Patient examined and fetal parts no longer palpable  Brief TAUS revealed oblique lie with compound presentation (hand)  Pitocin was discontinued with plan for ECV - please see separate note for procedure  Consent obtained  After successful version, AROM - clear fluid with fetal vertex appreciated      Will leave pitocin off 20 minutes and plan to restart at 6mu/min    Rahul Carbajal MD 2018 2:23 PM

## 2018-11-07 NOTE — DISCHARGE SUMMARY
Discharge Summary - Ruth Snyder 25 y o  female MRN: [id number]    Unit/Bed#: -01 Encounter: [id number]    Admission Date: 2018     Discharge Date: 2018    Admitting Attending: Lori Sibley MD  Delivering Attending: Dalia Clark DO  Discharge Attending: Dr Ethel Sylvester    Diagnosis:   Willis Morgans gestation at 81R2Y  Elective induction of labor  Abnormal 1 hour glucose testing, resolved    Procedures: Spontaneous Vaginal Delivery    Complications: none apparent    Hospital Course: Patient was admitted for elective induction of labor  Patient was started on low dose pitocin titration and desired no analgesia  During the course of her labor she had an unstable fetal lie and had a successful external cephalic version  After version she received AROM  She delivered via spontaneous vaginal delivery with no complications and no lacerations  She had an uncomplicated postpartum course  Condition at discharge: good     On day of discharge, patient was tolerating PO, passing flatus, urinating, and ambulating  Her pain was well controlled with oral analgesics  She was discharged home on postpartum day #1 with standard post partum instructions to follow up with her physician in 3-6 weeks for a postpartum appointment  Discharge instructions/Information to patient and family:   - Do not place anything (no partner, tampons or douche) in your vagina for 6 weeks    -You may walk for exercise for the first 6 weeks then gradually return to your usual activities    -Please do not drive for 1 week if you have no stitches and for 2 weeks if you have stitches or underwent a  delivery     -You may take baths or shower per your preference    -Please look at your bust (breasts) in the mirror daily and call for redness or tenderness or increased warmth    -Please call us for temperature > 100 4*F or 38* C, worsening pain or a foul discharge       Discharge Medications:   Prenatal vitamin daily for 6 months or the duration of nursing whichever is longer    Motrin 600 mg orally every 6 hours as needed for pain  Tylenol (over the counter) per bottle directions as needed for pain: do NOT use with percocet  Hydrocortisone cream 1% (over the counter) applied 1-2x daily to hemorrhoids as needed

## 2018-11-07 NOTE — PROCEDURES
Time out done by Dr Vasquez Mahan at [phone], procedure start time: [phone]    End time:1416, successful, pt returned to [facility]

## 2018-11-07 NOTE — OB LABOR/OXYTOCIN SAFETY PROGRESS
Oxytocin Safety Progress Check Note - Raman Mcdonnell 25 y o  female MRN: [id number]    Unit/Bed#: -01 Encounter: [id number]    Obstetric History       T3      L3     SAB0   TAB0   Ectopic0   Multiple0   Live Births3      Gestational Age: 41w4d  Contraction Frequency (minutes): 2 5-3  Contraction Quality: Moderate  Tachysystole: No   Dilation: 5        Effacement (%): 70  Station: -2  Baseline Rate: 125 bpm  Fetal Heart Rate: 120 BPM  FHR Category: Category I     Oxytocin Safety Progress Check: Safety check completed    Notes/comments:   FHT suddenly discontinuous for prolonged period  Patient checked and found to have no presenting fetal part  Ultrasound revealed change in position to tranverse      Patient counseled on options and desires external cephalic version without anesthesia    Pitocin discontinued, previously at 12, and terbutaline ordered for ECV attempt          Haleigh Willett MD 2018 2:02 PM

## 2018-11-07 NOTE — OB LABOR/OXYTOCIN SAFETY PROGRESS
Oxytocin Safety Progress Check Note - Marie Pap 25 y o  female MRN: [id number]    Unit/Bed#: -01 Encounter: [id number]    Obstetric History       T3      L3     SAB0   TAB0   Ectopic0   Multiple0   Live Births3      Gestational Age: 41w4d  Dose (vega-units/min) Oxytocin: 12 vega-units/min  Contraction Frequency (minutes): 2 5-3  Contraction Quality: Moderate  Tachysystole: No   Dilation: 5        Effacement (%): 70  Station: -2  Baseline Rate: 125 bpm  Fetal Heart Rate: 133 BPM  FHR Category: Category I     Oxytocin Safety Progress Check: Safety check completed    Notes/comments:      Maribel Sahara is starting to get more uncomfortable           Lali Powell MD 2018 12:48 PM

## 2018-11-07 NOTE — OB LABOR/OXYTOCIN SAFETY PROGRESS
Oxytocin Safety Progress Check Note - Ayse Mercedes 25 y o  female MRN: [id number]    Unit/Bed#: -01 Encounter: [id number]    Obstetric History       T3      L3     SAB0   TAB0   Ectopic0   Multiple0   Live Births3      Gestational Age: 41w4d  Dose (vega-units/min) Oxytocin: 2 vega-units/min (per dr mantilla)  Contraction Frequency (minutes): 2-3  Contraction Quality: Moderate  Tachysystole: No   Dilation: 6        Effacement (%): 70  Station: -2  Baseline Rate: 135 bpm  Fetal Heart Rate: 131 BPM  FHR Category: Category I     Oxytocin Safety Progress Check: Safety check completed    Notes/comments:   Pitocin restarted in the setting of increased interval between contractions and no vcervical change  Fetal head well applied to cervix  Patient feeling more pressure    FHT has 1 variable, but otherwise mod variability with accelerations          Myah Rodriguez MD 2018 4:12 PM

## 2018-11-08 VITALS
TEMPERATURE: 97.5 F | OXYGEN SATURATION: 100 % | WEIGHT: 163 LBS | SYSTOLIC BLOOD PRESSURE: 121 MMHG | BODY MASS INDEX: 27.16 KG/M2 | HEIGHT: 65 IN | RESPIRATION RATE: 18 BRPM | DIASTOLIC BLOOD PRESSURE: 76 MMHG | HEART RATE: 64 BPM

## 2018-11-08 LAB — RPR SER QL: NORMAL

## 2018-11-08 PROCEDURE — 99024 POSTOP FOLLOW-UP VISIT: CPT | Performed by: OBSTETRICS & GYNECOLOGY

## 2018-11-08 RX ORDER — CALCIUM CARBONATE 200(500)MG
1000 TABLET,CHEWABLE ORAL DAILY PRN
Refills: 0 | Status: SHIPPED | OUTPATIENT
Start: 2018-11-08 | End: 2018-12-11 | Stop reason: ALTCHOICE

## 2018-11-08 RX ORDER — DIAPER,BRIEF,INFANT-TODD,DISP
1 EACH MISCELLANEOUS 4 TIMES DAILY PRN
Qty: 30 G | Refills: 0 | Status: SHIPPED | OUTPATIENT
Start: 2018-11-08 | End: 2018-12-11 | Stop reason: ALTCHOICE

## 2018-11-08 RX ORDER — IBUPROFEN 600 MG/1
600 TABLET ORAL EVERY 6 HOURS PRN
Qty: 30 TABLET | Refills: 0 | Status: SHIPPED | OUTPATIENT
Start: 2018-11-08 | End: 2018-12-11 | Stop reason: ALTCHOICE

## 2018-11-08 RX ADMIN — IBUPROFEN 600 MG: 600 TABLET ORAL at 04:27

## 2018-11-08 NOTE — PROGRESS NOTES
Progress Note - OB/GYN   Vane Kelley 25 y o  female MRN: [id number]  Unit/Bed#:  302-01 Encounter: [id number]    Assessment:  Post partum Day #1 s/p  after successful ECV, stable, baby in room    Plan:  Continue routine post partum care   Encourage ambulation   Encourage breastfeeding   Patient would like discharge home today    Subjective/Objective   Chief Complaint:     Post delivery  Patient is doing well  Lochia WNL  Pain well controlled, having some cramping    Subjective:     Pain: yes, cramping, improved with meds  Tolerating PO: yes  Voiding: yes  Ambulating: yes  Breastfeeding:  yes  Chest pain: no  Shortness of breath: no  Leg pain: no  Lochia: WNL    Objective:     Vitals: /60 (BP Location: Right arm)   Pulse 74   Temp 98 4 °F (36 9 °C) (Oral)   Resp 18   Ht 5' 5" (1 651 m)   Wt 73 9 kg (163 lb)   LMP 2018   Breastfeeding? Yes   BMI 27 12 kg/m²       Intake/Output Summary (Last 24 hours) at 18 0411  Last data filed at 18 0019   Gross per 24 hour   Intake             1600 ml   Output              831 ml   Net              769 ml       Lab Results   Component Value Date    WBC 9 15 2018    HGB 10 0 (L) 2018    HCT 32 0 (L) 2018    MCV 81 (L) 2018     2018       Physical Exam:     Gen: AAOx3, NAD  CV: RRR  Lungs: CTA b/l  Abd: Soft, non-tender, non-distended, no rebound or guarding  Uterine fundus firm and non-tender, 1 cm below the umbilicus     Ext: Non tender        Shari Jeong MD  OB/GYN PGY-1  2018  4:11 AM

## 2018-11-08 NOTE — LACTATION NOTE
This note was copied from a baby's chart  CONSULT - LACTATION  Baby Girl  (Gemma Donnelly) Marietta Covert 1 days female MRN: [id number]    [facility] Room / Bed: (N)/(N) Encounter: [id number]    Maternal Information     MOTHER:  Zhanna Torres  Maternal Age: 25 y o    OB History: #: 1, Date: 13, Sex: Female, Weight: 3685 g (8 lb 2 oz), GA: 39w0d, Delivery: Vaginal, Spontaneous Delivery, Apgar1: None, Apgar5: None, Living: Living, Birth Comments: Healthy Baby    #: 2, Date: 14, Sex: Female, Weight: 3600 g (7 lb 15 oz), GA: 38w5d, Delivery: Vaginal, Spontaneous Delivery, Apgar1: None, Apgar5: None, Living: Living, Birth Comments: Healthy Baby    #: 3, Date: 16, Sex: Female, Weight: 3890 g (8 lb 9 2 oz), GA: 40w6d, Delivery: Vaginal, Spontaneous Delivery, Apgar1: 8, Apgar5: 9, Living: Living, Birth Comments: Healthy Baby girl    #: 4, Date: 18, Sex: Female, Weight: 3941 g (8 lb 11 oz), GA: 40w2d, Delivery: Vaginal, Spontaneous Delivery, Apgar1: 9, Apgar5: 10, Living: Living, Birth Comments: None   Previouse breast reduction surgery? No    Lactation history:   Has patient previously breast fed: Yes   How long had patient previously breast fed: 10 months each   Previous breast feeding complications:  Other (Comment) (mastitis with each breastfeeding relationship w/ return work)     Past Surgical History:   Procedure Laterality Date    MYRINGOTOMY W/ TUBES      WISDOM TOOTH EXTRACTION         Birth information:  YOB: 2020   Time of birth: 5:20 PM   Sex: female   Delivery type: Vaginal, Spontaneous Delivery   Birth Weight: 3941 g (8 lb 11 oz)   Percent of Weight Change: 0%     Gestational Age: 41w4d   [unfilled]    Assessment     Breast and nipple assessment: normal assessment     Assessment: normal assessment    Feeding assessment: feeding well  LATCH:  Latch: Grasps breast, tongue down, lips flanged, rhythmic sucking Audible Swallowing: Spontaneous and intermittent (24 hours old)   Type of Nipple: Everted (After stimulation)   Comfort (Breast/Nipple): Soft/non-tender   Hold (Positioning): No assist from staff, mother able to position/hold infant   LATCH Score: 10          Feeding recommendations:  breast feed on demand     Met with mother to go over feeding log since birth for the first week  Emphasized 8 or more (12) feedings in a 24 hour period, what to expect for the number of diapers per day of life and the progression of properties of the  stooling pattern  Discussed s/s that breastfeeding is going well after day 4 and when to get help from a pediatrician or lactation support person after day 4  Booklet included Breast Pumping Instructions, When You Go Back to Work or School, and Breastfeeding Resources for after discharge including access to the number for the [employer]  Discussed s/s engorgement and how to manage with medications and cool compresses as well as s/s mastitis and when to contact physician  [facility]  to call for assistance, questions, and concerns about breastfeeding  Extension provided          Freddy Gilford, RN 2018 6:31 PM

## 2018-11-08 NOTE — SOCIAL WORK
Breast pump consult  Discussed options with pt  Per pt request, Spectra pump ordered from [facility] via [facility] for room delivery by 5pm today  No other CM needs noted

## 2018-11-09 NOTE — UTILIZATION REVIEW
Notification of Maternity/Delivery & [place] Birth Information for Admission  Notification of Maternity/Delivery &  Birth Information for Admission to our facility [address] [place] [address]  Be advised that this patient was admitted to our facility under Inpatient Status  Please contact the Utilization Review Department where the patient is receiving care services for additional admission information  Facility: [address] [place] [address])  Address: [address] [place] [address]  Phone: [phone] Tax ID: [id number]  NPI: [id number]  Medicare ID: [id number]  Place of Service Code: 24   Place of Service Name: Inpatient Hospital  Presentation Date & Time: 2018  7:12 AM  Inpatient Admission Date & Time: 18 0611  Discharge Date & Time: 2018  9:16 PM   Discharge Disposition (if discharged): Home/Self Care  Attending Physician & NPI: Vera Ladd Do [[id number]]  ASH Alvarez  Specialty- Obstetrics and Gynecology  [facility] [place] ID- [id number]  [address] [place] [address]  Phone 1: [phone]  Fax: [phone]  Mother of  Information: Abdoul Hung   MRN: [id number] YOB: 1990   Mother's Admitting Diagnosis: Encounter for full-term uncomplicated delivery [G68]  36 weeks gestation of pregnancy [Z3A 40]  Estimated Date of Delivery: 18  Type of Delivery: Vaginal, Spontaneous Delivery    Delivering clinician: Vera Ladd   OB History      Para Term  AB Living    4 4 4 0 0 4    SAB TAB Ectopic Multiple Live Births    0 0 0 0 4         Name & MRN:   Information for the patient's :  Satish Amis Girl  Orlando Eisenmenger) [[id number]]      Delivery Information:  Sex: female  Delivered 2018 5:20 PM by Vaginal, Spontaneous Delivery; Gestational Age: 41w4d    [place] Measurements:  Weight: 8 lb 11 oz (3941 g);   Height: 20"    APGAR 1 minute 5 minutes 10 minutes Totals: [facility],[address] Utilization Review Department  Phone: [phone]; Fax [phone]  ATTENTION: Please call with any questions or concerns to [phone]  and carefully listen to the prompts so that you are directed to the right person  Send all requests for admission clinical reviews, approved or denied determinations and any other requests to fax [phone]   All voicemails are confidential

## 2018-11-16 LAB — PLACENTA IN STORAGE: NORMAL

## 2018-12-11 ENCOUNTER — POSTPARTUM VISIT (OUTPATIENT)
Dept: OBGYN CLINIC | Facility: CLINIC | Age: 28
End: 2018-12-11

## 2018-12-11 VITALS — DIASTOLIC BLOOD PRESSURE: 68 MMHG | SYSTOLIC BLOOD PRESSURE: 104 MMHG | BODY MASS INDEX: 24 KG/M2 | WEIGHT: 144.2 LBS

## 2018-12-11 PROCEDURE — 99024 POSTOP FOLLOW-UP VISIT: CPT | Performed by: OBSTETRICS & GYNECOLOGY

## 2018-12-11 NOTE — PROGRESS NOTES
Assessment/Plan:      Diagnoses and all orders for this visit:    Postpartum care and examination        Postpartum examination was completed  Patient will resume normal activity as 6 weeks out  She will return to the office in 6 months for her annual visit  Subjective:     Patient ID: Vane Kelley is a 25 y o  female  Patient returns status post vaginal delivery  She has done well she has no complaints at this time  Bernruma Marsh is gone  She is breast-feeding without difficulty  She is doing well emotionally  She has no plans for contraception at this time but  is planning vasectomy in March  Review of Systems   All other systems reviewed and are negative  Objective:     Physical Exam   Constitutional: She is oriented to person, place, and time  She appears well-developed and well-nourished  Abdominal: Soft  There is no tenderness  Genitourinary:   Genitourinary Comments: External genitalia demonstrate normal female without lesions  Vagina healthy without lesions or discharge  Cervix healthy without lesions or discharge  Uterus normal size nontender  Adnexa nontender without obvious mass   Neurological: She is alert and oriented to person, place, and time  Skin: Skin is warm and dry  Psychiatric: She has a normal mood and affect   Her behavior is normal  Judgment and thought content normal

## 2018-12-23 DIAGNOSIS — N61.0 MASTITIS: Primary | ICD-10-CM

## 2019-04-07 ENCOUNTER — DOCUMENTATION (OUTPATIENT)
Dept: LABOR AND DELIVERY | Facility: HOSPITAL | Age: 29
End: 2019-04-07

## 2019-04-07 DIAGNOSIS — N61.0 MASTITIS: Primary | ICD-10-CM

## 2019-04-07 RX ORDER — CEPHALEXIN 500 MG/1
500 CAPSULE ORAL EVERY 6 HOURS SCHEDULED
Qty: 28 CAPSULE | Refills: 0 | Status: SHIPPED | OUTPATIENT
Start: 2019-04-07 | End: 2019-04-14

## 2019-04-30 ENCOUNTER — OFFICE VISIT (OUTPATIENT)
Dept: URGENT CARE | Age: 29
End: 2019-04-30
Payer: COMMERCIAL

## 2019-04-30 VITALS
TEMPERATURE: 99.9 F | RESPIRATION RATE: 20 BRPM | DIASTOLIC BLOOD PRESSURE: 70 MMHG | SYSTOLIC BLOOD PRESSURE: 119 MMHG | HEART RATE: 117 BPM | OXYGEN SATURATION: 99 % | HEIGHT: 65 IN | BODY MASS INDEX: 23.32 KG/M2 | WEIGHT: 140 LBS

## 2019-04-30 DIAGNOSIS — J02.0 STREP PHARYNGITIS: Primary | ICD-10-CM

## 2019-04-30 DIAGNOSIS — J02.9 SORE THROAT: ICD-10-CM

## 2019-04-30 LAB — S PYO AG THROAT QL: POSITIVE

## 2019-04-30 PROCEDURE — 99213 OFFICE O/P EST LOW 20 MIN: CPT | Performed by: FAMILY MEDICINE

## 2019-04-30 PROCEDURE — 87430 STREP A AG IA: CPT | Performed by: FAMILY MEDICINE

## 2019-04-30 PROCEDURE — S9088 SERVICES PROVIDED IN URGENT: HCPCS | Performed by: FAMILY MEDICINE

## 2019-04-30 RX ORDER — AMOXICILLIN 500 MG/1
500 CAPSULE ORAL EVERY 12 HOURS SCHEDULED
Qty: 20 CAPSULE | Refills: 0 | Status: SHIPPED | OUTPATIENT
Start: 2019-04-30 | End: 2019-05-10

## 2019-05-18 DIAGNOSIS — J02.0 STREP THROAT: Primary | ICD-10-CM

## 2019-05-18 RX ORDER — AMOXICILLIN AND CLAVULANATE POTASSIUM 875; 125 MG/1; MG/1
1 TABLET, FILM COATED ORAL EVERY 12 HOURS
Qty: 20 TABLET | Refills: 0 | Status: SHIPPED | OUTPATIENT
Start: 2019-05-18 | End: 2019-05-28

## 2019-05-20 ENCOUNTER — OFFICE VISIT (OUTPATIENT)
Dept: INTERNAL MEDICINE CLINIC | Facility: CLINIC | Age: 29
End: 2019-05-20
Payer: COMMERCIAL

## 2019-05-20 VITALS
BODY MASS INDEX: 22.66 KG/M2 | SYSTOLIC BLOOD PRESSURE: 108 MMHG | RESPIRATION RATE: 16 BRPM | OXYGEN SATURATION: 97 % | HEIGHT: 65 IN | WEIGHT: 136 LBS | HEART RATE: 71 BPM | DIASTOLIC BLOOD PRESSURE: 70 MMHG

## 2019-05-20 DIAGNOSIS — E78.5 HYPERLIPIDEMIA, UNSPECIFIED HYPERLIPIDEMIA TYPE: ICD-10-CM

## 2019-05-20 DIAGNOSIS — J45.20 MILD INTERMITTENT ASTHMA WITHOUT COMPLICATION: ICD-10-CM

## 2019-05-20 DIAGNOSIS — R09.89 ABDOMINAL BRUIT: ICD-10-CM

## 2019-05-20 DIAGNOSIS — Z13.1 SCREENING FOR DIABETES MELLITUS: ICD-10-CM

## 2019-05-20 DIAGNOSIS — Z00.00 HEALTHCARE MAINTENANCE: ICD-10-CM

## 2019-05-20 DIAGNOSIS — E55.9 VITAMIN D DEFICIENCY: Primary | ICD-10-CM

## 2019-05-20 DIAGNOSIS — Z13.0 SCREENING, ANEMIA, DEFICIENCY, IRON: ICD-10-CM

## 2019-05-20 PROBLEM — I10 ESSENTIAL HYPERTENSION: Status: ACTIVE | Noted: 2019-05-20

## 2019-05-20 PROCEDURE — 99203 OFFICE O/P NEW LOW 30 MIN: CPT | Performed by: INTERNAL MEDICINE

## 2019-05-20 PROCEDURE — 3008F BODY MASS INDEX DOCD: CPT | Performed by: INTERNAL MEDICINE

## 2019-06-05 ENCOUNTER — ANNUAL EXAM (OUTPATIENT)
Dept: OBGYN CLINIC | Facility: CLINIC | Age: 29
End: 2019-06-05
Payer: COMMERCIAL

## 2019-06-05 VITALS
HEIGHT: 66 IN | SYSTOLIC BLOOD PRESSURE: 104 MMHG | WEIGHT: 136.2 LBS | BODY MASS INDEX: 21.89 KG/M2 | DIASTOLIC BLOOD PRESSURE: 68 MMHG

## 2019-06-05 DIAGNOSIS — Z01.419 ENCOUNTER FOR GYNECOLOGICAL EXAMINATION (GENERAL) (ROUTINE) WITHOUT ABNORMAL FINDINGS: Primary | ICD-10-CM

## 2019-06-05 DIAGNOSIS — Z12.4 CERVICAL CANCER SCREENING: ICD-10-CM

## 2019-06-05 DIAGNOSIS — Z11.51 SCREENING FOR HUMAN PAPILLOMAVIRUS (HPV): ICD-10-CM

## 2019-06-05 PROCEDURE — 99395 PREV VISIT EST AGE 18-39: CPT | Performed by: OBSTETRICS & GYNECOLOGY

## 2019-06-05 PROCEDURE — G0145 SCR C/V CYTO,THINLAYER,RESCR: HCPCS | Performed by: OBSTETRICS & GYNECOLOGY

## 2019-06-05 PROCEDURE — 87624 HPV HI-RISK TYP POOLED RSLT: CPT | Performed by: OBSTETRICS & GYNECOLOGY

## 2019-06-06 LAB
HPV HR 12 DNA CVX QL NAA+PROBE: NEGATIVE
HPV16 DNA CVX QL NAA+PROBE: NEGATIVE
HPV18 DNA CVX QL NAA+PROBE: NEGATIVE

## 2019-06-11 LAB
LAB AP GYN PRIMARY INTERPRETATION: NORMAL
Lab: NORMAL

## 2019-08-13 ENCOUNTER — TELEPHONE (OUTPATIENT)
Dept: INTERNAL MEDICINE CLINIC | Facility: CLINIC | Age: 29
End: 2019-08-13

## 2019-08-13 ENCOUNTER — OFFICE VISIT (OUTPATIENT)
Dept: INTERNAL MEDICINE CLINIC | Facility: CLINIC | Age: 29
End: 2019-08-13
Payer: COMMERCIAL

## 2019-08-13 ENCOUNTER — HOSPITAL ENCOUNTER (OUTPATIENT)
Dept: ULTRASOUND IMAGING | Facility: HOSPITAL | Age: 29
Discharge: HOME | End: 2019-08-13
Payer: COMMERCIAL

## 2019-08-13 ENCOUNTER — APPOINTMENT (OUTPATIENT)
Dept: LAB | Facility: HOSPITAL | Age: 29
End: 2019-08-13
Payer: COMMERCIAL

## 2019-08-13 VITALS
SYSTOLIC BLOOD PRESSURE: 110 MMHG | HEIGHT: 66 IN | BODY MASS INDEX: 21.83 KG/M2 | WEIGHT: 135.8 LBS | HEART RATE: 73 BPM | OXYGEN SATURATION: 99 % | RESPIRATION RATE: 16 BRPM | DIASTOLIC BLOOD PRESSURE: 68 MMHG

## 2019-08-13 DIAGNOSIS — R09.89 ABDOMINAL BRUIT: ICD-10-CM

## 2019-08-13 DIAGNOSIS — R22.31 MASS OF RIGHT AXILLA: Primary | ICD-10-CM

## 2019-08-13 DIAGNOSIS — Z13.1 SCREENING FOR DIABETES MELLITUS: ICD-10-CM

## 2019-08-13 DIAGNOSIS — R59.0 ENLARGED LYMPH NODES IN ARMPIT: Primary | ICD-10-CM

## 2019-08-13 DIAGNOSIS — R58 ECCHYMOSIS: ICD-10-CM

## 2019-08-13 DIAGNOSIS — I88.9 ADENITIS: ICD-10-CM

## 2019-08-13 DIAGNOSIS — E78.5 HYPERLIPIDEMIA, UNSPECIFIED HYPERLIPIDEMIA TYPE: ICD-10-CM

## 2019-08-13 DIAGNOSIS — R22.31 MASS OF RIGHT AXILLA: ICD-10-CM

## 2019-08-13 DIAGNOSIS — Z13.0 SCREENING, ANEMIA, DEFICIENCY, IRON: ICD-10-CM

## 2019-08-13 DIAGNOSIS — E55.9 VITAMIN D DEFICIENCY: ICD-10-CM

## 2019-08-13 LAB
25(OH)D3 SERPL-MCNC: 20.3 NG/ML (ref 30–100)
ALBUMIN SERPL BCP-MCNC: 3.7 G/DL (ref 3.5–5)
ALP SERPL-CCNC: 69 U/L (ref 46–116)
ALT SERPL W P-5'-P-CCNC: 15 U/L (ref 12–78)
ANION GAP SERPL CALCULATED.3IONS-SCNC: 11 MMOL/L (ref 4–13)
APTT PPP: 27 SECONDS (ref 23–37)
AST SERPL W P-5'-P-CCNC: 16 U/L (ref 5–45)
BASOPHILS # BLD AUTO: 0.04 THOUSANDS/ΜL (ref 0–0.1)
BASOPHILS NFR BLD AUTO: 1 % (ref 0–1)
BILIRUB SERPL-MCNC: 0.32 MG/DL (ref 0.2–1)
BUN SERPL-MCNC: 10 MG/DL (ref 5–25)
CALCIUM SERPL-MCNC: 8.9 MG/DL (ref 8.3–10.1)
CHLORIDE SERPL-SCNC: 107 MMOL/L (ref 100–108)
CO2 SERPL-SCNC: 24 MMOL/L (ref 21–32)
CREAT SERPL-MCNC: 0.76 MG/DL (ref 0.6–1.3)
EOSINOPHIL # BLD AUTO: 0.16 THOUSAND/ΜL (ref 0–0.61)
EOSINOPHIL NFR BLD AUTO: 3 % (ref 0–6)
ERYTHROCYTE [DISTWIDTH] IN BLOOD BY AUTOMATED COUNT: 13.2 % (ref 11.6–15.1)
EST. AVERAGE GLUCOSE BLD GHB EST-MCNC: 100 MG/DL
GFR SERPL CREATININE-BSD FRML MDRD: 105 ML/MIN/1.73SQ M
GLUCOSE SERPL-MCNC: 118 MG/DL (ref 65–140)
HBA1C MFR BLD: 5.1 % (ref 4.2–6.3)
HCT VFR BLD AUTO: 40.3 % (ref 34.8–46.1)
HGB BLD-MCNC: 13.1 G/DL (ref 11.5–15.4)
IMM GRANULOCYTES # BLD AUTO: 0.01 THOUSAND/UL (ref 0–0.2)
IMM GRANULOCYTES NFR BLD AUTO: 0 % (ref 0–2)
INR PPP: 1.07 (ref 0.84–1.19)
IRON SERPL-MCNC: 57 UG/DL (ref 50–170)
LYMPHOCYTES # BLD AUTO: 1.58 THOUSANDS/ΜL (ref 0.6–4.47)
LYMPHOCYTES NFR BLD AUTO: 28 % (ref 14–44)
MCH RBC QN AUTO: 28.7 PG (ref 26.8–34.3)
MCHC RBC AUTO-ENTMCNC: 32.5 G/DL (ref 31.4–37.4)
MCV RBC AUTO: 88 FL (ref 82–98)
MONOCYTES # BLD AUTO: 0.32 THOUSAND/ΜL (ref 0.17–1.22)
MONOCYTES NFR BLD AUTO: 6 % (ref 4–12)
NEUTROPHILS # BLD AUTO: 3.47 THOUSANDS/ΜL (ref 1.85–7.62)
NEUTS SEG NFR BLD AUTO: 62 % (ref 43–75)
NRBC BLD AUTO-RTO: 0 /100 WBCS
PLATELET # BLD AUTO: 260 THOUSANDS/UL (ref 149–390)
PMV BLD AUTO: 10.1 FL (ref 8.9–12.7)
POTASSIUM SERPL-SCNC: 3.6 MMOL/L (ref 3.5–5.3)
PROT SERPL-MCNC: 7.6 G/DL (ref 6.4–8.2)
PROTHROMBIN TIME: 14 SECONDS (ref 11.6–14.5)
RBC # BLD AUTO: 4.56 MILLION/UL (ref 3.81–5.12)
SODIUM SERPL-SCNC: 142 MMOL/L (ref 136–145)
WBC # BLD AUTO: 5.58 THOUSAND/UL (ref 4.31–10.16)

## 2019-08-13 PROCEDURE — 86706 HEP B SURFACE ANTIBODY: CPT

## 2019-08-13 PROCEDURE — 83540 ASSAY OF IRON: CPT

## 2019-08-13 PROCEDURE — 82306 VITAMIN D 25 HYDROXY: CPT

## 2019-08-13 PROCEDURE — 3008F BODY MASS INDEX DOCD: CPT | Performed by: INTERNAL MEDICINE

## 2019-08-13 PROCEDURE — 85730 THROMBOPLASTIN TIME PARTIAL: CPT

## 2019-08-13 PROCEDURE — 85610 PROTHROMBIN TIME: CPT

## 2019-08-13 PROCEDURE — 99213 OFFICE O/P EST LOW 20 MIN: CPT | Performed by: INTERNAL MEDICINE

## 2019-08-13 PROCEDURE — 85025 COMPLETE CBC W/AUTO DIFF WBC: CPT

## 2019-08-13 PROCEDURE — 36415 COLL VENOUS BLD VENIPUNCTURE: CPT

## 2019-08-13 PROCEDURE — 83036 HEMOGLOBIN GLYCOSYLATED A1C: CPT

## 2019-08-13 PROCEDURE — 76882 US LMTD JT/FCL EVL NVASC XTR: CPT

## 2019-08-13 PROCEDURE — 80053 COMPREHEN METABOLIC PANEL: CPT

## 2019-08-13 RX ORDER — CEPHALEXIN 250 MG/1
250 CAPSULE ORAL EVERY 6 HOURS SCHEDULED
Qty: 28 CAPSULE | Refills: 0 | Status: SHIPPED | OUTPATIENT
Start: 2019-08-13 | End: 2019-08-20

## 2019-08-13 NOTE — PROGRESS NOTES
BMI Counseling: Body mass index is 22 25 kg/m²  Discussed the patient's BMI with her  The BMI in the normal range    Assessment/Plan:    Adenitis  Will check ultrasound of the right axilla I will give the patient a prescription for antibiotic Keflex if the ultrasound is positive for reactive lymph node  Ecchymosis  Non traumatic check CBC, PT/APTT? Etiology the patient is not having spontaneous bleeding will carefully monitor symptoms and if any change or worsening she is notify me  Mass of right axilla  Suspect enlarging lymph node check ultrasound of the right axilla if positive for atypical lymph node will have the patient see Surgical Oncology for biopsy rule out lymphoma         Problem List Items Addressed This Visit        Immune and Lymphatic    Adenitis     Will check ultrasound of the right axilla I will give the patient a prescription for antibiotic Keflex if the ultrasound is positive for reactive lymph node  Relevant Medications    cephalexin (KEFLEX) 250 mg capsule       Other    Mass of right axilla - Primary     Suspect enlarging lymph node check ultrasound of the right axilla if positive for atypical lymph node will have the patient see Surgical Oncology for biopsy rule out lymphoma         Relevant Orders    US extremity soft tissue (Completed)    Ecchymosis     Non traumatic check CBC, PT/APTT? Etiology the patient is not having spontaneous bleeding will carefully monitor symptoms and if any change or worsening she is notify me  Relevant Orders    Protime-INR (Completed)    APTT (Completed)          Return to office 1  months  call if any problems  Subjective:      Patient ID: Alexa Bhandari is a 30 y o  female      HPI 30year old female coming in with a chief complaint of right axilla mass, bruising right wrist spontaneous atraumatic; the patient reports me that she developed  Right axillary lymph yesterday, right hand bruise  Book bag  No night sweats , stopped breast feeding , back to usual deodarant ,  No f/ c  No weight loss and appetite good  (mastitis in April  But saw gyn no problems) no chane of posterior cyst ,  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 several years ago  No night sweats, no weight loss, no change in fatigue levels  The following portions of the patient's history were reviewed and updated as appropriate: allergies, current medications, past family history, past medical history, past social history, past surgical history and problem list     Review of Systems   Constitutional: Negative for activity change, appetite change, chills, fatigue, fever and unexpected weight change  HENT: Negative for congestion, sinus pain and sore throat  Respiratory: Negative for cough and shortness of breath  No hemoptysis   Cardiovascular: Negative for chest pain  Hematological: Positive for adenopathy  Bruises/bleeds easily  Objective:    No follow-ups on file  Procedure: Us Extremity Soft Tissue    Result Date: 8/13/2019  Narrative: Right axillary and right thenar/palmar ULTRASOUND INDICATION:   R22 31: Localized swelling, mass and lump, right upper limb  Right axillary lump developed for one day  The mass is soft, mobile and nontender  The patient stopped breast-feeding a week ago  COMPARISON:  None TECHNIQUE:   Real-time ultrasound of the right axilla and area of concern in the right thenar eminence was performed with a linear transducer with both volumetric sweeps and still imaging techniques  FINDINGS:  There is an abnormal ovoid heterogeneous soft tissue mass with central vascular flow in the right axilla measuring 2 6 x 1 2 x 1 9 cm correlating to the area of palpable concern likely an abnormally enlarged right axillary lymph node  There is adjacent local extrinsic compression on the axillary vein which is patent   Sonographic evaluation of the right thenar eminence reveals normal soft tissues without discrete mass or collection  Impression: 1   2 6 cm abnormal soft tissue mass in the right axilla correlates to area of palpable concern likely an enlarged, abnormal lymph node  Recommend follow-up repeat ultrasound in 6-12 weeks to assess for resolution  Further clinical assessment recommended  2   No definite collection or soft tissue mass in the right thenar region  If symptoms persist, contrast enhanced MRI of the hand could be obtained for further characterization  Workstation performed: [id number]          No Known Allergies    Past Medical History:   Diagnosis Date    Anorexia     resolved    Anxiety     Asthma     Migraine     As Child/Resolved    Normal delivery     2013 daughter, 2014 daughter, 2016 daughter    Varicella     As child     Past Surgical History:   Procedure Laterality Date    MYRINGOTOMY W/ TUBES      WISDOM TOOTH EXTRACTION       Current Outpatient Medications on File Prior to Visit   Medication Sig Dispense Refill    [DISCONTINUED] albuterol (PROVENTIL HFA,VENTOLIN HFA) 90 mcg/act inhaler Inhale 2 puffs every 6 (six) hours as needed for wheezing      [DISCONTINUED] Prenatal Multivit-Min-Fe-FA (PRENATAL VITAMINS PO) Prenatal Vitamins TABS TAKE 1 TABLET DAILY  Refills: 0   , M D ; Active       No current facility-administered medications on file prior to visit        Family History   Problem Relation Age of Onset   [place] Cancer Mother         uterine    Cancer Father         prostate    Diabetes Maternal Grandmother     Stroke Maternal Grandmother     Cancer Maternal Grandfather         lung    Heart disease Paternal Grandfather      Social History     Socioeconomic History    Marital status: /Civil Union     Spouse name: Not on file    Number of children: Not on file    Years of education: Not on file    Highest education level: Not on file   Occupational History    Occupation: Physician Assistant     Comment: [employer] [address] [place] [address] Financial resource strain: Not on file    Food insecurity:     Worry: Not on file     Inability: Not on file    Transportation needs:     Medical: Not on file     Non-medical: Not on file   Tobacco Use    Smoking status: Never Smoker    Smokeless tobacco: Never Used   Substance and Sexual Activity    Alcohol use: Yes     Comment: social    Drug use: No    Sexual activity: Yes     Partners: Male     Birth control/protection: None   Lifestyle    Physical activity:     Days per week: Not on file     Minutes per session: Not on file    Stress: Not on file   Relationships    Social connections:     Talks on phone: Not on file     Gets together: Not on file     Attends Jain service: Not on file     Active member of club or organization: Not on file     Attends meetings of clubs or organizations: Not on file     Relationship status: Not on file    Intimate partner violence:     Fear of current or ex partner: Not on file     Emotionally abused: Not on file     Physically abused: Not on file     Forced sexual activity: Not on file   Other Topics Concern    Not on file   Social History Narrative    Not on file     Vitals:    08/13/19 1320   BP: 110/68   Pulse: 73   Resp: 16   SpO2: 99%   Weight: 61 6 kg (135 lb 12 8 oz)   Height: 5' 5 5" (1 664 m)     Results for orders placed or performed in visit on 06/05/19   HPV High Risk   Result Value Ref Range    HPV Other HR Negative Negative    HPV16 Negative Negative    HPV18 Negative Negative   Liquid-based pap, screening   Result Value Ref Range    Case Report       Gynecologic Cytology Report                       Case: [id number]                                  Authorizing Provider:  Aimee Ruffin DO          Collected:           06/05/2019 9978              Ordering Location:     [facility]      Received:            06/05/2019 [address] [facility]                                     [facility]                                                                    First Screen:          Cindy Greene, CT                                                         Specimen:    LIQUID-BASED PAP, SCREENING, Cervix, Endocervical                                          Primary Interpretation Negative for intraepithelial lesion or malignancy     Specimen Adequacy       Satisfactory for evaluation  Endocervical/transformation zone component present  Additional Information       [employer]'s FDA approved ,  and ThinPrep Imaging Duo System are utilized with strict adherence to the 's instruction manual to prepare gynecologic and non-gynecologic cytology specimens for the production of ThinPrep slides as well as for gynecologic ThinPrep imaging  These processes have been validated by our laboratory and/or by the   The Pap test is not a diagnostic procedure and should not be used as the sole means to detect cervical cancer  It is only a screening procedure to aid in the detection of cervical cancer and its precursors  Both false-negative and false-positive results have been experienced  Your patient's test result should be interpreted in this context together with the history and clinical findings  Weight (last 2 days)     Date/Time   Weight    08/13/19 1320   61 6 (135 8)            Body mass index is 22 25 kg/m²  BP      Temp      Pulse     Resp      SpO2        Vitals:    08/13/19 1320   Weight: 61 6 kg (135 lb 12 8 oz)     Vitals:    08/13/19 1320   Weight: 61 6 kg (135 lb 12 8 oz)       /68   Pulse 73   Resp 16   Ht 5' 5 5" (1 664 m)   Wt 61 6 kg (135 lb 12 8 oz)   SpO2 99%   BMI 22 25 kg/m²          Physical Exam   Constitutional: She appears well-developed and well-nourished  HENT:   Head: Normocephalic  Mouth/Throat: Oropharynx is clear and moist    Eyes: Pupils are equal, round, and reactive to light   Conjunctivae are normal  Right eye exhibits no discharge  Left eye exhibits no discharge  No scleral icterus  Neck: Neck supple  Cardiovascular: Normal rate, regular rhythm, normal heart sounds and intact distal pulses  Exam reveals no gallop and no friction rub  No murmur heard  Pulmonary/Chest: Breath sounds normal  No respiratory distress  She has no wheezes  She has no rales  Abdominal: Soft  Bowel sounds are normal  She exhibits no distension and no mass  There is no tenderness  There is no rebound and no guarding  Musculoskeletal: She exhibits no edema or deformity  Lymphadenopathy:     She has no cervical adenopathy  Neurological: She is alert   Coordination normal      medical assistant Ana Rosa Bowels in the room during the examination there is a 3 cm x 4 cm ovoid structure in the right axilla hard but mobile it is mildly tender no erythema, no warmth; also mild bruising of the volar aspect of the right wrist

## 2019-08-13 NOTE — TELEPHONE ENCOUNTER
I have discussed the ultrasound findings with the patient I will have her see surgical Oncology Dr Ailyn Olivas, I have put an order in the chart for the patient;  Derrick Whitfield please call the patient to see if she needs any additional information regarding Dr Ailyn Olivas

## 2019-08-14 ENCOUNTER — TELEPHONE (OUTPATIENT)
Dept: SURGICAL ONCOLOGY | Facility: CLINIC | Age: 29
End: 2019-08-14

## 2019-08-14 LAB — HBV SURFACE AB SER-ACNC: 320.33 MIU/ML

## 2019-08-14 NOTE — ASSESSMENT & PLAN NOTE
Suspect enlarging lymph node check ultrasound of the right axilla if positive for atypical lymph node will have the patient see Surgical Oncology for biopsy rule out lymphoma

## 2019-08-14 NOTE — ASSESSMENT & PLAN NOTE
Will check ultrasound of the right axilla I will give the patient a prescription for antibiotic Keflex if the ultrasound is positive for reactive lymph node

## 2019-08-14 NOTE — ASSESSMENT & PLAN NOTE
Non traumatic check CBC, PT/APTT? Etiology the patient is not having spontaneous bleeding will carefully monitor symptoms and if any change or worsening she is notify me

## 2019-08-14 NOTE — TELEPHONE ENCOUNTER
New Patient Encounter      New Patient Intake Form   Patient Details:  Chance Haddad  1990  [id number]    Background Information:  [address] starts by opening a telephone encounter and gathering the following information   Who is calling to schedule? If not self, relationship to patient? self   Referring Provider Jon Elder   What is the diagnosis? Lump under arm   When was the diagnosis? 8/2019   Is patient aware of diagnosis? Yes   Reason for visit? NP DX   Have you had any testing done? If so: when, where? Yes  [facility]   Are records in EPIC? Yes   Was the patient told to bring a disk? No   Scheduling Information:   Preferred [place]:  [place]   Requesting Specific Provider? yes   Are there any dates/time the patient cannot be seen? no   Counseling Pre-Screen:  If the patient answers YES to any of the below questions, please route to the appropriate location specific counselor    Have you felt anxious or worried about cancer and the treatment you are receiving? No   Has your diagnosis caused physical, emotional, or financial hardship for you? No   Do you anticipate any transportation issues to get to your appointment? No   Miscellaneous: scheduled pt with Dr Mayda Jean-Baptiste   After completing the above information, please route to Financial Counselor and the appropriate Nurse Navigator for review

## 2019-08-15 DIAGNOSIS — E55.9 VITAMIN D DEFICIENCY: Primary | ICD-10-CM

## 2019-08-16 ENCOUNTER — CONSULT (OUTPATIENT)
Dept: SURGICAL ONCOLOGY | Facility: CLINIC | Age: 29
End: 2019-08-16
Payer: COMMERCIAL

## 2019-08-16 VITALS
RESPIRATION RATE: 16 BRPM | HEART RATE: 93 BPM | TEMPERATURE: 98.3 F | BODY MASS INDEX: 21.99 KG/M2 | WEIGHT: 132 LBS | DIASTOLIC BLOOD PRESSURE: 82 MMHG | SYSTOLIC BLOOD PRESSURE: 116 MMHG | HEIGHT: 65 IN

## 2019-08-16 DIAGNOSIS — R22.31 MASS OF RIGHT AXILLA: Primary | ICD-10-CM

## 2019-08-16 PROCEDURE — 99243 OFF/OP CNSLTJ NEW/EST LOW 30: CPT | Performed by: SURGERY

## 2019-08-16 RX ORDER — CEFAZOLIN SODIUM 1 G/50ML
1000 SOLUTION INTRAVENOUS ONCE
Status: CANCELLED | OUTPATIENT
Start: 2019-08-16 | End: 2019-08-16

## 2019-08-16 RX ORDER — MULTIVIT-MIN/IRON/FOLIC ACID/K 18-600-40
CAPSULE ORAL
COMMUNITY
End: 2022-07-28 | Stop reason: ALTCHOICE

## 2019-08-16 NOTE — PROGRESS NOTES
Surgical Oncology Consult       [address] [facility]  [address] [facility]  [facility] [id number] [address] ASIF Gilman  1990  [id number]  [facility]  [address] [place] [id number] [phone]    Diagnoses and all orders for this visit:    Mass of right axilla  -     Case request operating room: EXCISION BIOPSY LYMPH NODE RIGHT AXILLARY WITH LYMPHOMA PROTOCOL; Standing  -     Case request operating room: EXCISION BIOPSY LYMPH NODE RIGHT AXILLARY WITH LYMPHOMA PROTOCOL    Other orders  -     Cholecalciferol (VITAMIN D) 2000 units CAPS; Take by mouth  -     Incentive spirometry; Standing  -     Insert and maintain IV line; Standing  -     Void On-Call to O R ; Standing  -     Place sequential compression device; Standing  -     ceFAZolin (ANCEF) IVPB (premix) 1,000 mg        Chief Complaint   Patient presents with    Consult     Pt here for consult for enlarged right axillary lymph node that she noticed about 5 days ago  She reports several episodes of mastitis and clogged ducts this year, and states she d/c'd breast feeding last week  No follow-ups on file  No history exists  History of Present Illness:  66-year-old female who was recently shaving under her right arm and felt a mass  She brought this to the attention of her primary physician and an ultrasound was obtained  Ultrasound on August 13, 2019 revealed a 2 6 x 1 2 x 1 9 cm soft tissue mass consistent with an enlarged right axillary lymph node  I personally reviewed the films  She denies any fever, chills, night sweats  No fatigue  No change in appetite  No unintentional weight loss  She recently stopped breastfeeding but has had issues with mastitis over the last several months  These have resolved    There is a 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 when she was 11 on the right upper extremity  She is noticing no changes on her breast self exam     Review of Systems  Complete ROS Surg Onc:   Constitutional: The patient denies new or recent history of general fatigue, no recent weight loss, no change in appetite  Eyes: No complaints of visual problems, no scleral icterus  ENT: no complaints of ear pain, no hoarseness, no difficulty swallowing,  no tinnitus and no new masses in head, oral cavity, or neck  Cardiovascular: No complaints of chest pain, no palpitations, no ankle edema  Respiratory: No complaints of shortness of breath, no cough  Gastrointestinal: No complaints of jaundice, no bloody stools, no pale stools  Genitourinary: No complaints of dysuria, no hematuria, no nocturia, no frequent urination, no urethral discharge  Musculoskeletal: No complaints of weakness, paralysis, joint stiffness or arthralgias  Integumentary: No complaints of rash, no new lesions  Neurological: No complaints of convulsions, no seizures, no dizziness  Hematologic/Lymphatic: No complaints of easy bruising  Endocrine:  No hot or cold intolerance  No polydipsia, polyphagia, or polyuria  Allergy/immunology:  No environmental allergies  No food allergies  Not immunocompromised  Skin:  No pallor or rash  No wound            Patient Active Problem List   Diagnosis    Asthma    Supervision of normal pregnancy in third trimester    Abnormal glucose affecting pregnancy    Breech presentation    40 weeks gestation of pregnancy    Vaginal delivery    Vitamin D deficiency    Abdominal bruit    Screening for diabetes mellitus    Screening, anemia, deficiency, iron    Essential hypertension    Hyperlipidemia    Encounter for gynecological examination (general) (routine) without abnormal findings    Cervical cancer screening    Screening for human papillomavirus (HPV)    Mass of right axilla    Ecchymosis    Adenitis     Past Medical History:   Diagnosis Date    Anorexia     resolved    Anxiety     Asthma     Migraine     As Child/Resolved    Normal delivery     2013 daughter, 2014 daughter, 2016 daughter    Varicella     As child     Past Surgical History:   Procedure Laterality Date    ARM SKIN LESION BIOPSY / EXCISION Right     MYRINGOTOMY W/ TUBES      WISDOM TOOTH EXTRACTION       Family History   Problem Relation Age of Onset    Uterine cancer Mother 48    Prostate cancer Father 62    Diabetes Maternal Grandmother     Stroke Maternal Grandmother     Cancer Maternal Grandfather         lung    Heart disease Paternal Grandfather      Social History     Socioeconomic History    Marital status: /Civil Union     Spouse name: Not on file    Number of children: Not on file    Years of education: Not on file    Highest education level: Not on file   Occupational History    Occupation: Physician Assistant     Comment: [employer]   Social Needs    Financial resource strain: Not on file    Food insecurity:     Worry: Not on file     Inability: Not on file    Transportation needs:     Medical: Not on file     Non-medical: Not on file   Tobacco Use    Smoking status: Never Smoker    Smokeless tobacco: Never Used   Substance and Sexual Activity    Alcohol use: Yes     Comment: social    Drug use: No    Sexual activity: Yes     Partners: Male     Birth control/protection: None   Lifestyle    Physical activity:     Days per week: Not on file     Minutes per session: Not on file    Stress: Not on file   Relationships    Social connections:     Talks on phone: Not on file     Gets together: Not on file     Attends Jehovah's witness service: Not on file     Active member of club or organization: Not on file     Attends meetings of clubs or organizations: Not on file     Relationship status: Not on file    Intimate partner violence:     Fear of current or ex partner: Not on file     Emotionally abused: Not on file     Physically abused: Not on file     Forced sexual activity: Not on file   Other Topics Concern    Not on file   Social History Narrative    Not on file       Current Outpatient Medications:     cephalexin (KEFLEX) 250 mg capsule, Take 1 capsule (250 mg total) by mouth every 6 (six) hours for 7 days, Disp: 28 capsule, Rfl: 0    Cholecalciferol (VITAMIN D) 2000 units CAPS, Take by mouth, Disp: , Rfl:   No Known Allergies  Vitals:    08/16/19 1232   BP: 116/82   Pulse: 93   Resp: 16   Temp: 98 3 °F (36 8 °C)       Physical Exam   Constitutional: General appearance: The Patient is well-developed and well-nourished who appears the stated age in no acute distress  Patient is pleasant and talkative  HEENT:  Normocephalic  Sclerae are anicteric  Mucous membranes are moist  Neck is supple without adenopathy  No JVD  Chest: The lungs are clear to auscultation  Cardiac: Heart is regular rate  Abdomen: Abdomen is soft, non-tender, non-distended and without masses  Extremities: There is no clubbing or cyanosis  There is no edema  Symmetric  Neuro: Grossly nonfocal  Gait is normal      Lymphatic: No evidence of cervical adenopathy bilaterally  Right axillary adenopathy  No evidence of inguinal adenopathy bilaterally  Skin: Warm, anicteric  Psych:  Patient is pleasant and talkative  Breasts:      Pathology:  [unfilled]    Labs:      Imaging  Us Extremity Soft Tissue    Result Date: 8/13/2019  Narrative: Right axillary and right thenar/palmar ULTRASOUND INDICATION:   R22 31: Localized swelling, mass and lump, right upper limb  Right axillary lump developed for one day  The mass is soft, mobile and nontender  The patient stopped breast-feeding a week ago  COMPARISON:  None TECHNIQUE:   Real-time ultrasound of the right axilla and area of concern in the right thenar eminence was performed with a linear transducer with both volumetric sweeps and still imaging techniques   FINDINGS: There is an abnormal ovoid heterogeneous soft tissue mass with central vascular flow in the right axilla measuring 2 6 x 1 2 x 1 9 cm correlating to the area of palpable concern likely an abnormally enlarged right axillary lymph node  There is adjacent local extrinsic compression on the axillary vein which is patent  Sonographic evaluation of the right thenar eminence reveals normal soft tissues without discrete mass or collection  Impression: 1   2 6 cm abnormal soft tissue mass in the right axilla correlates to area of palpable concern likely an enlarged, abnormal lymph node  Recommend follow-up repeat ultrasound in 6-12 weeks to assess for resolution  Further clinical assessment recommended  2   No definite collection or soft tissue mass in the right thenar region  If symptoms persist, contrast enhanced MRI of the hand could be obtained for further characterization  Workstation performed: [id number]     I reviewed the above laboratory and imaging data  Discussion/Summary:  33-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

## 2019-08-27 DIAGNOSIS — R22.31 MASS OF RIGHT AXILLA: Primary | ICD-10-CM

## 2019-08-28 ENCOUNTER — HOSPITAL ENCOUNTER (OUTPATIENT)
Dept: RADIOLOGY | Age: 29
Discharge: HOME | End: 2019-08-28
Payer: COMMERCIAL

## 2019-08-28 DIAGNOSIS — R22.31 MASS OF RIGHT AXILLA: ICD-10-CM

## 2019-08-28 PROCEDURE — 76882 US LMTD JT/FCL EVL NVASC XTR: CPT

## 2019-09-03 DIAGNOSIS — R22.31 MASS OF RIGHT AXILLA: Primary | ICD-10-CM

## 2019-09-17 ENCOUNTER — TRANSCRIBE ORDERS (OUTPATIENT)
Dept: ADMINISTRATIVE | Facility: HOSPITAL | Age: 29
End: 2019-09-17

## 2019-10-03 ENCOUNTER — HOSPITAL ENCOUNTER (OUTPATIENT)
Dept: RADIOLOGY | Age: 29
Discharge: HOME | End: 2019-10-03
Payer: COMMERCIAL

## 2019-10-03 DIAGNOSIS — R22.31 MASS OF RIGHT AXILLA: ICD-10-CM

## 2019-10-03 PROCEDURE — 76882 US LMTD JT/FCL EVL NVASC XTR: CPT

## 2019-10-07 ENCOUNTER — TELEPHONE (OUTPATIENT)
Dept: SURGICAL ONCOLOGY | Facility: CLINIC | Age: 29
End: 2019-10-07

## 2019-10-07 NOTE — TELEPHONE ENCOUNTER
----- Message from Deya Lopez RN sent at 10/7/2019  9:41 AM EDT -----  Can you call her and make a 15 min f/u appt with Precandrés Pak?    ----- Message -----  From: Razia Monzon MD  Sent: 10/7/2019   8:50 AM EDT  To: Deya Lopez RN    Let her know [address] [facility] is better    We should see her to close the loop on who will do the follow up, either us or her primary

## 2019-10-15 ENCOUNTER — OFFICE VISIT (OUTPATIENT)
Dept: SURGICAL ONCOLOGY | Facility: CLINIC | Age: 29
End: 2019-10-15
Payer: COMMERCIAL

## 2019-10-15 VITALS
TEMPERATURE: 98.1 F | HEIGHT: 65 IN | BODY MASS INDEX: 21.66 KG/M2 | HEART RATE: 66 BPM | DIASTOLIC BLOOD PRESSURE: 68 MMHG | WEIGHT: 130 LBS | SYSTOLIC BLOOD PRESSURE: 114 MMHG | RESPIRATION RATE: 14 BRPM

## 2019-10-15 DIAGNOSIS — R22.31 MASS OF RIGHT AXILLA: Primary | ICD-10-CM

## 2019-10-15 PROCEDURE — 99212 OFFICE O/P EST SF 10 MIN: CPT | Performed by: SURGERY

## 2019-10-15 NOTE — PROGRESS NOTES
Surgical Oncology Follow Up       [facility]  [facility] [address]  [place] [address]    Zhanna Torres  1990  [id number]  [address]  [facility] [address] [place] [address]    Diagnoses and all orders for this visit:    Mass of right axilla  -     US extremity soft tissue; Future        Chief Complaint   Patient presents with    Follow-up     follow up after axillary US       Return in about 3 months (around 1/15/2020) for Office Visit, Imaging - See orders  No history exists  History of Present Illness:  Patient returns in follow-up of her axillary ultrasound  She is no longer feeling the large mass  She still feels the small nodule that she has felt for many years  Axillary ultrasound from October 3, 2019 revealed that the large mass has decreased in size to 2 1 x 0 5 x 0 9 cm  The smaller lymph node is 0 8 x 0 3 x 0 5 cm  Both of these had a central echogenic hilum  I personally reviewed the films  No fever, chills, or night sweats  No unintentional weight loss  Review of Systems  Complete ROS Surg Onc:   Complete ROS Surg Onc:   Constitutional: The patient denies new or recent history of general fatigue, no recent weight loss, no change in appetite  Eyes: No complaints of visual problems, no scleral icterus  ENT: no complaints of ear pain, no hoarseness, no difficulty swallowing,  no tinnitus and no new masses in head, oral cavity, or neck  Cardiovascular: No complaints of chest pain, no palpitations, no ankle edema  Respiratory: No complaints of shortness of breath, no cough  Gastrointestinal: No complaints of jaundice, no bloody stools, no pale stools  Genitourinary: No complaints of dysuria, no hematuria, no nocturia, no frequent urination, no urethral discharge     Musculoskeletal: No complaints of weakness, paralysis, joint stiffness or arthralgias  Integumentary: No complaints of rash, no new lesions  Neurological: No complaints of convulsions, no seizures, no dizziness  Hematologic/Lymphatic: No complaints of easy bruising  Endocrine:  No hot or cold intolerance  No polydipsia, polyphagia, or polyuria  Allergy/immunology:  No environmental allergies  No food allergies  Not immunocompromised  Skin:  No pallor or rash  No wound          Patient Active Problem List   Diagnosis    Asthma    Supervision of normal pregnancy in third trimester    Abnormal glucose affecting pregnancy    Breech presentation    40 weeks gestation of pregnancy    Vaginal delivery    Vitamin D deficiency    Abdominal bruit    Screening for diabetes mellitus    Screening, anemia, deficiency, iron    Essential hypertension    Hyperlipidemia    Encounter for gynecological examination (general) (routine) without abnormal findings    Cervical cancer screening    Screening for human papillomavirus (HPV)    Mass of right axilla    Ecchymosis    Adenitis     Past Medical History:   Diagnosis Date    Anorexia     resolved    Anxiety     Asthma     Migraine     As Child/Resolved    Normal delivery     2013 daughter, 2014 daughter, 2016 daughter    PONV (postoperative nausea and vomiting)     Varicella     As child     Past Surgical History:   Procedure Laterality Date    ARM SKIN LESION BIOPSY / EXCISION Right     MYRINGOTOMY W/ TUBES      WISDOM TOOTH EXTRACTION       Family History   Problem Relation Age of Onset    Uterine cancer Mother 48    Prostate cancer Father 62    Diabetes Maternal Grandmother     Stroke Maternal Grandmother     Cancer Maternal Grandfather         lung    Heart disease Paternal Grandfather      Social History     Socioeconomic History    Marital status: /Civil Union     Spouse name: Not on file    Number of children: Not on file    Years of education: Not on file    Highest education level: Not on file   Occupational History    Occupation: Physician Assistant     Comment: [employer]   Social Needs    Financial resource strain: Not on file    Food insecurity:     Worry: Not on file     Inability: Not on file    Transportation needs:     Medical: Not on file     Non-medical: Not on file   Tobacco Use    Smoking status: Never Smoker    Smokeless tobacco: Never Used   Substance and Sexual Activity    Alcohol use: Yes     Frequency: 2-4 times a month     Drinks per session: 1 or 2     Comment: social    Drug use: No    Sexual activity: Yes     Partners: Male     Birth control/protection: None   Lifestyle    Physical activity:     Days per week: Not on file     Minutes per session: Not on file    Stress: Not on file   Relationships    Social connections:     Talks on phone: Not on file     Gets together: Not on file     Attends Roman [employer] service: Not on file     Active member of club or organization: Not on file     Attends meetings of clubs or organizations: Not on file     Relationship status: Not on file    Intimate partner violence:     Fear of current or ex partner: Not on file     Emotionally abused: Not on file     Physically abused: Not on file     Forced sexual activity: Not on file   Other Topics Concern    Not on file   Social History Narrative    Not on file       Current Outpatient Medications:     Cholecalciferol (VITAMIN D) 2000 units CAPS, Take by mouth, Disp: , Rfl:   No Known Allergies  Vitals:    10/15/19 0806   BP: 114/68   Pulse: 66   Resp: 14   Temp: 98 1 °F (36 7 °C)       Physical Exam  Constitutional: General appearance: The Patient is well-developed and well-nourished who appears the stated age in no acute distress  Patient is pleasant and talkative  HEENT:  Normocephalic  Sclerae are anicteric  Mucous membranes are moist      Extremities: There is no clubbing or cyanosis  There is no edema  Symmetric    Neuro: Grossly nonfocal  Gait is normal      Lymphatic:  There is a nontender freely movable firm less than 1 cm in size right axillary node  Skin: Warm, anicteric  Psych:  Patient is pleasant and talkative  Breasts:        Pathology:  [unfilled]    Labs:      Imaging  Us Extremity Soft Tissue    Result Date: 10/3/2019  Narrative: Right axillary ULTRASOUND INDICATION:   R22 31: Localized swelling, mass and lump, right upper limb  COMPARISON:  Right axillary ultrasound 8/28/2019 TECHNIQUE:   Real-time ultrasound of the right axilla was performed with a linear transducer with both volumetric sweeps and still imaging techniques  FINDINGS:  2 1 x 0 5 x 0 9 cm lymph node previously 2 6 x 0 8 x 1 3 cm  Central echogenic hilum is present  0 8 x 0 3 x 0 5 cm lymph node previously 0 9 x 0 4 x 0 7 cm  Central echogenic hilum is present  No abnormal fluid collection  Impression: Interval decreased size of dominant otherwise normal-appearing right axillary lymph node currently measuring 2 1 x 0 5 cm and previously 2 6 x 0 8 cm  Workstation performed: [id number]     I reviewed the above laboratory and imaging data  Discussion/Summary:  64-year-old female with diminishing right axillary adenopathy  She is otherwise asymptomatic  We have proceeded with a course of observation  I discussed surgical resection versus observation, but given that this is decreasing in size and these appear benign would favor continued surveillance  We will plan on repeating her ultrasound in 3 months  I will see her again at that time for another clinical exam   She is agreeable to this  All her questions were answered

## 2020-01-02 ENCOUNTER — OFFICE VISIT (OUTPATIENT)
Dept: INTERNAL MEDICINE CLINIC | Facility: CLINIC | Age: 30
End: 2020-01-02
Payer: COMMERCIAL

## 2020-01-02 VITALS
HEIGHT: 65 IN | TEMPERATURE: 98 F | OXYGEN SATURATION: 99 % | SYSTOLIC BLOOD PRESSURE: 110 MMHG | DIASTOLIC BLOOD PRESSURE: 64 MMHG | HEART RATE: 110 BPM | WEIGHT: 129 LBS | BODY MASS INDEX: 21.49 KG/M2

## 2020-01-02 DIAGNOSIS — R68.89 FLU-LIKE SYMPTOMS: Primary | ICD-10-CM

## 2020-01-02 DIAGNOSIS — J02.9 SORE THROAT: ICD-10-CM

## 2020-01-02 DIAGNOSIS — R68.89 FLU-LIKE SYMPTOMS: ICD-10-CM

## 2020-01-02 PROBLEM — H69.82 DYSFUNCTION OF LEFT EUSTACHIAN TUBE: Status: ACTIVE | Noted: 2020-01-02

## 2020-01-02 PROBLEM — H69.92 DYSFUNCTION OF LEFT EUSTACHIAN TUBE: Status: ACTIVE | Noted: 2020-01-02

## 2020-01-02 PROCEDURE — 87070 CULTURE OTHR SPECIMN AEROBIC: CPT | Performed by: INTERNAL MEDICINE

## 2020-01-02 PROCEDURE — 99213 OFFICE O/P EST LOW 20 MIN: CPT | Performed by: INTERNAL MEDICINE

## 2020-01-02 PROCEDURE — 87631 RESP VIRUS 3-5 TARGETS: CPT | Performed by: INTERNAL MEDICINE

## 2020-01-02 RX ORDER — OSELTAMIVIR PHOSPHATE 75 MG/1
75 CAPSULE ORAL EVERY 12 HOURS SCHEDULED
Qty: 10 CAPSULE | Refills: 0 | Status: SHIPPED | OUTPATIENT
Start: 2020-01-02 | End: 2020-01-07

## 2020-01-02 RX ORDER — OSELTAMIVIR PHOSPHATE 75 MG/1
75 CAPSULE ORAL EVERY 12 HOURS SCHEDULED
Qty: 10 CAPSULE | Refills: 0 | Status: SHIPPED | OUTPATIENT
Start: 2020-01-02 | End: 2020-01-02 | Stop reason: SDUPTHER

## 2020-01-02 NOTE — PROGRESS NOTES
Assessment/Plan:    Flu-like symptoms  Patient does present with flu like symptoms although she has had some strep exposure within her family  Rapid onset of temperature, body aches; patient also reports me decreased appetite  Right ear pressure at this point time I would like to check a throat culture/rapid throat strep test, influenza nasal PCR  I will start on Tamiflu 75 mg b i d  X5 days, will await the results of the throat culture prior to starting antibiotics she may rotate Tylenol/Advil hours every 4-6 hours as needed plenty of fluids rest we will make a determination regarding her work on Sunday if her rapid flu test is positive she will need to remain out of work for the next 5 days  Did explain to her if she is not improving next several days please notify me immediately  Dysfunction of left eustachian tube  I did recommend she use Flonase 2 sprays each nostril once a day for next 7-10 days  Problem List Items Addressed This Visit        Other    Flu-like symptoms - Primary     Patient does present with flu like symptoms although she has had some strep exposure within her family  Rapid onset of temperature, body aches; patient also reports me decreased appetite  Right ear pressure at this point time I would like to check a throat culture/rapid throat strep test, influenza nasal PCR  I will start on Tamiflu 75 mg b i d  X5 days, will await the results of the throat culture prior to starting antibiotics she may rotate Tylenol/Advil hours every 4-6 hours as needed plenty of fluids rest we will make a determination regarding her work on Sunday if her rapid flu test is positive she will need to remain out of work for the next 5 days  Did explain to her if she is not improving next several days please notify me immediately           Relevant Medications    oseltamivir (TAMIFLU) 75 mg capsule    Other Relevant Orders    Throat culture    Parvovirus B19, IgM          At this point time we have decided to hold off on antibiotics her symptoms are likely viral in nature we will check a throat culture, flu swab, parvovirus B 19 IgM if there is any worsening, changes symptoms were symptoms are not improving please notify me  She may rotate Advil/Tylenol as directed p r n  Subjective:      Patient ID: Pravin Daniel is a 30 y o  female  HPI 30year old female coming in with a chief complaint of myalgia, arthralgia,mild st,  fever, cough/cold symptoms; the patient reports me starting last night she started with body aches, cold like symptoms reports me multiple family member/children are sick currently  She has had exposure at the hospital to flu patient's and also within her home strep  She has taken some Advil at midnight last night   right ear achey, arthralgia, 3 kids sick one with strept , one with croup, pt had the flu shot,  Cold earlier in the month,  Mucous production  IBP at midnight; no rash  Patient is not pregnant not nursing    The following portions of the patient's history were reviewed and updated as appropriate: allergies, current medications, past family history, past medical history, past social history, past surgical history and problem list     Review of Systems   Constitutional: Negative for appetite change, chills and fever  HENT: Positive for congestion, ear pain and rhinorrhea  Negative for sinus pressure, sinus pain, sneezing and sore throat  Eyes: Negative for itching  Respiratory: Negative for cough, shortness of breath and wheezing  Cardiovascular: Negative for chest pain  Increase heart right   Gastrointestinal: Positive for nausea  Neurological: Positive for headaches  Objective:    No follow-ups on file  No results found        No Known Allergies    Past Medical History:   Diagnosis Date    Anorexia     resolved    Anxiety     Asthma     Migraine     As Child/Resolved    Normal delivery     2013 daughter, 2014 daughter, 2016 daughter  PONV (postoperative nausea and vomiting)     Varicella     As child     Past Surgical History:   Procedure Laterality Date    ARM SKIN LESION BIOPSY / EXCISION Right     MYRINGOTOMY W/ TUBES      WISDOM TOOTH EXTRACTION       Current Outpatient Medications on File Prior to Visit   Medication Sig Dispense Refill    Cholecalciferol (VITAMIN D) 2000 units CAPS Take by mouth       No current facility-administered medications on file prior to visit        Family History   Problem Relation Age of Onset    Uterine cancer Mother 48    Prostate cancer Father 62    Diabetes Maternal Grandmother     Stroke Maternal Grandmother     Cancer Maternal Grandfather         lung    Heart disease Paternal Grandfather      Social History     Socioeconomic History    Marital status: /Civil Union     Spouse name: Not on file    Number of children: Not on file    Years of education: Not on file    Highest education level: Not on file   Occupational History    Occupation: Physician Assistant     Comment: [employer]   Social Needs    Financial resource strain: Not on file    Food insecurity:     Worry: Not on file     Inability: Not on file    Transportation needs:     Medical: Not on file     Non-medical: Not on file   Tobacco Use    Smoking status: Never Smoker    Smokeless tobacco: Never Used   Substance and Sexual Activity    Alcohol use: Yes     Frequency: 2-4 times a month     Drinks per session: 1 or 2     Comment: social    Drug use: No    Sexual activity: Yes     Partners: Male     Birth control/protection: None   Lifestyle    Physical activity:     Days per week: Not on file     Minutes per session: Not on file    Stress: Not on file   Relationships    Social connections:     Talks on phone: Not on file     Gets together: Not on file     Attends [employer]an service: Not on file     Active member of club or organization: Not on file     Attends meetings of clubs or organizations: Not on file     Relationship status: Not on file    Intimate partner violence:     Fear of current or ex partner: Not on file     Emotionally abused: Not on file     Physically abused: Not on file     Forced sexual activity: Not on file   Other Topics Concern    Not on file   Social History Narrative    Not on file     Vitals:    01/02/20 0922   BP: 110/64   Pulse: (!) 110   Temp: 98 °F (36 7 °C)   SpO2: 99%   Weight: 58 5 kg (129 lb)   Height: 5' 5" (1 651 m)     Results for orders placed or performed in visit on 08/13/19   Hemoglobin A1C   Result Value Ref Range    Hemoglobin A1C 5 1 4 2 - 6 3 %     mg/dl   Comprehensive metabolic panel   Result Value Ref Range    Sodium 142 136 - 145 mmol/L    Potassium 3 6 3 5 - 5 3 mmol/L    Chloride 107 100 - 108 mmol/L    CO2 24 21 - 32 mmol/L    ANION GAP 11 4 - 13 mmol/L    BUN 10 5 - 25 mg/dL    Creatinine 0 76 0 60 - 1 30 mg/dL    Glucose 118 65 - 140 mg/dL    Calcium 8 9 8 3 - 10 1 mg/dL    AST 16 5 - 45 U/L    ALT 15 12 - 78 U/L    Alkaline Phosphatase 69 46 - 116 U/L    Total Protein 7 6 6 4 - 8 2 g/dL    Albumin 3 7 3 5 - 5 0 g/dL    Total Bilirubin 0 32 0 20 - 1 00 mg/dL    eGFR 105 ml/min/1 73sq m   Iron   Result Value Ref Range    Iron 57 50 - 170 ug/dL   Hepatitis B surface antibody   Result Value Ref Range    Hep B S Ab 320 33 mIU/mL   Vitamin D 25 hydroxy   Result Value Ref Range    Vit D, 25-Hydroxy 20 3 (L) 30 0 - 100 0 ng/mL   Protime-INR   Result Value Ref Range    Protime 14 0 11 6 - 14 5 seconds    INR 1 07 0 84 - 1 19   APTT   Result Value Ref Range    PTT 27 23 - 37 seconds   CBC and differential   Result Value Ref Range    WBC 5 58 4 31 - 10 16 Thousand/uL    RBC 4 56 3 81 - 5 12 Million/uL    Hemoglobin 13 1 11 5 - 15 4 g/dL    Hematocrit 40 3 34 8 - 46 1 %    MCV 88 82 - 98 fL    MCH 28 7 26 8 - 34 3 pg    MCHC 32 5 31 4 - 37 4 g/dL    RDW 13 2 11 6 - 15 1 %    MPV 10 1 8 9 - 12 7 fL    Platelets 384 244 - 169 Thousands/uL nRBC 0 /100 WBCs    Neutrophils Relative 62 43 - 75 %    Immat GRANS % 0 0 - 2 %    Lymphocytes Relative 28 14 - 44 %    Monocytes Relative 6 4 - 12 %    Eosinophils Relative 3 0 - 6 %    Basophils Relative 1 0 - 1 %    Neutrophils Absolute 3 47 1 85 - 7 62 Thousands/µL    Immature Grans Absolute 0 01 0 00 - 0 20 Thousand/uL    Lymphocytes Absolute 1 58 0 60 - 4 47 Thousands/µL    Monocytes Absolute 0 32 0 17 - 1 22 Thousand/µL    Eosinophils Absolute 0 16 0 00 - 0 61 Thousand/µL    Basophils Absolute 0 04 0 00 - 0 10 Thousands/µL     Weight (last 2 days)     Date/Time   Weight    01/02/20 0922   58 5 (129)            Body mass index is 21 47 kg/m²  /64 (01/02/20 0922)    Temp 98 °F (36 7 °C) (01/02/20 0922)    Pulse (!) 110 (01/02/20 0922)   Resp      SpO2 99 % (01/02/20 7146)      Vitals:    01/02/20 0922   Weight: 58 5 kg (129 lb)     Vitals:    01/02/20 0922   Weight: 58 5 kg (129 lb)       /64   Pulse (!) 110   Temp 98 °F (36 7 °C)   Ht 5' 5" (1 651 m)   Wt 58 5 kg (129 lb)   SpO2 99%   BMI 21 47 kg/m²          Physical Exam   Constitutional: She appears well-developed and well-nourished  HENT:   Head: Normocephalic  Mouth/Throat: Oropharynx is clear and moist    Eyes: Pupils are equal, round, and reactive to light  Conjunctivae are normal  Right eye exhibits no discharge  Left eye exhibits no discharge  No scleral icterus  Neck: Neck supple  Cardiovascular: Normal rate, regular rhythm, normal heart sounds and intact distal pulses  Exam reveals no gallop and no friction rub  No murmur heard  Pulmonary/Chest: Breath sounds normal  No respiratory distress  She has no wheezes  She has no rales  Musculoskeletal: She exhibits no edema or deformity  Lymphadenopathy:     She has no cervical adenopathy  no rash, full flexion and extension of the cervical spine minimal erythema no exudate normal airway nasal congestion clear no reproducible sinus pain or tenderness    Left tympanic membrane retraction or erythema no bulging    Clear nasal congestion

## 2020-01-02 NOTE — TELEPHONE ENCOUNTER
[facility] does not have this script in stock    Patient called and asked if we can send this to [facility]     Please advise

## 2020-01-02 NOTE — ASSESSMENT & PLAN NOTE
Patient does present with flu like symptoms although she has had some strep exposure within her family  Rapid onset of temperature, body aches; patient also reports me decreased appetite  Right ear pressure at this point time I would like to check a throat culture/rapid throat strep test, influenza nasal PCR  I will start on Tamiflu 75 mg b i d  X5 days, will await the results of the throat culture prior to starting antibiotics she may rotate Tylenol/Advil hours every 4-6 hours as needed plenty of fluids rest we will make a determination regarding her work on Sunday if her rapid flu test is positive she will need to remain out of work for the next 5 days  Did explain to her if she is not improving next several days please notify me immediately

## 2020-01-03 ENCOUNTER — TELEPHONE (OUTPATIENT)
Dept: INTERNAL MEDICINE CLINIC | Facility: CLINIC | Age: 30
End: 2020-01-03

## 2020-01-03 LAB
FLUAV RNA NPH QL NAA+PROBE: NORMAL
FLUBV RNA NPH QL NAA+PROBE: NORMAL
INTERNAL QC RESULT: NORMAL
RSV RNA NPH QL NAA+PROBE: NORMAL

## 2020-01-03 NOTE — TELEPHONE ENCOUNTER
I spoke with the patient she has minor aches, no fevers and a little nausea  Overall she feels better        ----- Message from Mora Rashid DO sent at 1/2/2020  8:12 PM EST -----  Call pt for update

## 2020-01-04 LAB — BACTERIA THROAT CULT: NORMAL

## 2020-01-14 ENCOUNTER — HOSPITAL ENCOUNTER (OUTPATIENT)
Dept: RADIOLOGY | Age: 30
Discharge: HOME | End: 2020-01-14
Payer: COMMERCIAL

## 2020-01-14 DIAGNOSIS — R22.31 MASS OF RIGHT AXILLA: ICD-10-CM

## 2020-01-14 PROCEDURE — 76882 US LMTD JT/FCL EVL NVASC XTR: CPT

## 2020-01-21 ENCOUNTER — OFFICE VISIT (OUTPATIENT)
Dept: SURGICAL ONCOLOGY | Facility: CLINIC | Age: 30
End: 2020-01-21
Payer: COMMERCIAL

## 2020-01-21 VITALS
SYSTOLIC BLOOD PRESSURE: 116 MMHG | RESPIRATION RATE: 14 BRPM | TEMPERATURE: 97.6 F | HEIGHT: 65 IN | WEIGHT: 130 LBS | HEART RATE: 87 BPM | DIASTOLIC BLOOD PRESSURE: 78 MMHG | BODY MASS INDEX: 21.66 KG/M2

## 2020-01-21 DIAGNOSIS — R22.31 MASS OF RIGHT AXILLA: Primary | ICD-10-CM

## 2020-01-21 PROCEDURE — 99212 OFFICE O/P EST SF 10 MIN: CPT | Performed by: SURGERY

## 2020-01-21 NOTE — PROGRESS NOTES
Surgical Oncology Follow Up       [address] [facility]  [facility] [place]  [address] [facility] [address]  [place] [address]    Zhanna Torres  1990  [id number]  [address]  [facility] [place] [address] [place] [address]    Diagnoses and all orders for this visit:    Mass of right axilla        Chief Complaint   Patient presents with    Follow-up       No follow-ups on file  No history exists  History of Present Illness:  Patient returns in follow-up of the axillary ultrasound  The mass continues to decrease in size and is actually smaller than before  Ultrasound from January 14, 2020 reveals large axillary lymph node measures 1 8 x 0 4 x 0 8 cm  This is decreased from August 2019  The 2nd lymph node is 8 x 3 x 6 mm  I personally reviewed the films  No further follow-up was recommended by Radiology  She denies any fever, chills, night sweats or unintentional weight loss  Review of Systems  Complete ROS Surg Onc:   Complete ROS Surg Onc:   Constitutional: The patient denies new or recent history of general fatigue, no recent weight loss, no change in appetite  Eyes: No complaints of visual problems, no scleral icterus  ENT: no complaints of ear pain, no hoarseness, no difficulty swallowing,  no tinnitus and no new masses in head, oral cavity, or neck  Cardiovascular: No complaints of chest pain, no palpitations, no ankle edema  Respiratory: No complaints of shortness of breath, no cough  Gastrointestinal: No complaints of jaundice, no bloody stools, no pale stools  Genitourinary: No complaints of dysuria, no hematuria, no nocturia, no frequent urination, no urethral discharge  Musculoskeletal: No complaints of weakness, paralysis, joint stiffness or arthralgias  Integumentary: No complaints of rash, no new lesions     Neurological: No complaints of convulsions, no seizures, no dizziness  Hematologic/Lymphatic: No complaints of easy bruising  Endocrine:  No hot or cold intolerance  No polydipsia, polyphagia, or polyuria  Allergy/immunology:  No environmental allergies  No food allergies  Not immunocompromised  Skin:  No pallor or rash  No wound          Patient Active Problem List   Diagnosis    Asthma    Supervision of normal pregnancy in third trimester    Abnormal glucose affecting pregnancy    Breech presentation    40 weeks gestation of pregnancy    Vaginal delivery    Vitamin D deficiency    Abdominal bruit    Screening for diabetes mellitus    Screening, anemia, deficiency, iron    Essential hypertension    Hyperlipidemia    Encounter for gynecological examination (general) (routine) without abnormal findings    Cervical cancer screening    Screening for human papillomavirus (HPV)    Mass of right axilla    Ecchymosis    Adenitis    Flu-like symptoms    Dysfunction of left eustachian tube     Past Medical History:   Diagnosis Date    Anorexia     resolved    Anxiety     Asthma     Migraine     As Child/Resolved    Normal delivery     2013 daughter, 2014 daughter, 2016 daughter    PONV (postoperative nausea and vomiting)     Varicella     As child     Past Surgical History:   Procedure Laterality Date    ARM SKIN LESION BIOPSY / EXCISION Right     MYRINGOTOMY W/ TUBES      WISDOM TOOTH EXTRACTION       Family History   Problem Relation Age of Onset    Uterine cancer Mother 48    Prostate cancer Father 62    Diabetes Maternal Grandmother     Stroke Maternal Grandmother     Cancer Maternal Grandfather         lung    Heart disease Paternal Grandfather      Social History     Socioeconomic History    Marital status: /Civil Union     Spouse name: Not on file    Number of children: Not on file    Years of education: Not on file    Highest education level: Not on file   Occupational History    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   Social Needs    Financial resource strain: Not on file    Food insecurity:     Worry: Not on file     Inability: Not on file    Transportation needs:     Medical: Not on file     Non-medical: Not on file   Tobacco Use    Smoking status: Never Smoker    Smokeless tobacco: Never Used   Substance and Sexual Activity    Alcohol use: Yes     Frequency: 2-4 times a month     Drinks per session: 1 or 2     Comment: social    Drug use: No    Sexual activity: Yes     Partners: Male     Birth control/protection: None   Lifestyle    Physical activity:     Days per week: Not on file     Minutes per session: Not on file    Stress: Not on file   Relationships    Social connections:     Talks on phone: Not on file     Gets together: Not on file     Attends Alevism service: Not on file     Active member of club or organization: Not on file     Attends meetings of clubs or organizations: Not on file     Relationship status: Not on file    Intimate partner violence:     Fear of current or ex partner: Not on file     Emotionally abused: Not on file     Physically abused: Not on file     Forced sexual activity: Not on file   Other Topics Concern    Not on file   Social History Narrative    Not on file       Current Outpatient Medications:     Cholecalciferol (VITAMIN D) 2000 units CAPS, Take by mouth, Disp: , Rfl:   No Known Allergies  Vitals:    01/21/20 0907   BP: 116/78   Pulse: 87   Resp: 14   Temp: 97 6 °F (36 4 °C)       Physical Exam  Constitutional: General appearance: The Patient is well-developed and well-nourished who appears the stated age in no acute distress  Patient is pleasant and talkative  HEENT:  Normocephalic  Sclerae are anicteric  Mucous membranes are moist    Extremities: There is no clubbing or cyanosis  There is no edema  Symmetric  Neuro: Grossly nonfocal  Gait is normal      Lymphatic:  There is a palpable nodule in the axilla    This measures less than 1 cm in size  This is smaller than before  Is soft and freely movable  No evidence of axillary adenopathy bilaterally  Skin: Warm, anicteric  Psych:  Patient is pleasant and talkative  Breasts:        Pathology:  [unfilled]    Labs:      Imaging  Us Extremity Soft Tissue    Result Date: 1/15/2020  Narrative: Right axillary ULTRASOUND INDICATION:   R22 31: Localized swelling, mass and lump, right upper limb  COMPARISON:  October 3, 2019 TECHNIQUE:   Real-time ultrasound of the palpable lump in the right axilla was performed with a linear transducer with both volumetric sweeps and still imaging techniques  Findings: The right axillary lymph node is still present and is 1 8 x 0 4 x 0 8 cm today  At the time of the initial workup on August 13, 2019 was 2 6 x 1 2 x 1 9 cm  This has been slowly diminishing in size over the course of serial sonography  It has a smooth  border  No visible internal echogenic foci to suggest calcifications  There is no evidence of skin thickening superficially  An adjacent smaller lymph node is also visible  It is 8 x 3 x 6 mm  Impression: Right axillary lymph node continues to diminish in size  This probably does not require any continued ultrasound surveillance unless there is a clinical change  Workstation performed: [id number]     I reviewed the above laboratory and imaging data  Discussion/Summary: 23-year-old female with diminishing right axillary adenopathy  She is otherwise asymptomatic  We have proceeded with a course of observation  Radiology feels no further ultrasounds are indicated  By physical exam this is diminishing in size as well  She will contact us if she notices that this lesion is changing in size or she develops any symptoms such as fever, chills, night sweats or fatigue  She is agreeable to this    All her questions were answered

## 2020-06-10 ENCOUNTER — ANNUAL EXAM (OUTPATIENT)
Dept: OBGYN CLINIC | Facility: CLINIC | Age: 30
End: 2020-06-10
Payer: COMMERCIAL

## 2020-06-10 VITALS
BODY MASS INDEX: 20.72 KG/M2 | TEMPERATURE: 97.4 F | SYSTOLIC BLOOD PRESSURE: 116 MMHG | WEIGHT: 132 LBS | DIASTOLIC BLOOD PRESSURE: 72 MMHG | HEIGHT: 67 IN

## 2020-06-10 DIAGNOSIS — Z01.419 ENCOUNTER FOR GYNECOLOGICAL EXAMINATION (GENERAL) (ROUTINE) WITHOUT ABNORMAL FINDINGS: Primary | ICD-10-CM

## 2020-06-10 PROBLEM — Z11.51 SCREENING FOR HUMAN PAPILLOMAVIRUS (HPV): Status: RESOLVED | Noted: 2019-06-05 | Resolved: 2020-06-10

## 2020-06-10 PROBLEM — R68.89 FLU-LIKE SYMPTOMS: Status: RESOLVED | Noted: 2020-01-02 | Resolved: 2020-06-10

## 2020-06-10 PROBLEM — Z12.4 CERVICAL CANCER SCREENING: Status: RESOLVED | Noted: 2019-06-05 | Resolved: 2020-06-10

## 2020-06-10 PROBLEM — Z3A.40 40 WEEKS GESTATION OF PREGNANCY: Status: RESOLVED | Noted: 2018-10-31 | Resolved: 2020-06-10

## 2020-06-10 PROBLEM — Z34.93 SUPERVISION OF NORMAL PREGNANCY IN THIRD TRIMESTER: Status: RESOLVED | Noted: 2018-03-21 | Resolved: 2020-06-10

## 2020-06-10 PROBLEM — Z13.1 SCREENING FOR DIABETES MELLITUS: Status: RESOLVED | Noted: 2019-05-20 | Resolved: 2020-06-10

## 2020-06-10 PROBLEM — Z13.0 SCREENING, ANEMIA, DEFICIENCY, IRON: Status: RESOLVED | Noted: 2019-05-20 | Resolved: 2020-06-10

## 2020-06-10 PROBLEM — O99.810 ABNORMAL GLUCOSE AFFECTING PREGNANCY: Status: RESOLVED | Noted: 2018-08-31 | Resolved: 2020-06-10

## 2020-06-10 PROCEDURE — 99395 PREV VISIT EST AGE 18-39: CPT | Performed by: OBSTETRICS & GYNECOLOGY

## 2020-06-13 ENCOUNTER — TRANSCRIBE ORDERS (OUTPATIENT)
Dept: ADMINISTRATIVE | Age: 30
End: 2020-06-13

## 2020-06-13 ENCOUNTER — APPOINTMENT (OUTPATIENT)
Dept: LAB | Age: 30
End: 2020-06-13
Payer: COMMERCIAL

## 2020-06-13 DIAGNOSIS — Z00.8 HEALTH EXAMINATION IN POPULATION SURVEY: ICD-10-CM

## 2020-06-13 DIAGNOSIS — E55.9 VITAMIN D DEFICIENCY: ICD-10-CM

## 2020-06-13 DIAGNOSIS — Z00.8 HEALTH EXAMINATION IN POPULATION SURVEY: Primary | ICD-10-CM

## 2020-06-13 LAB
25(OH)D3 SERPL-MCNC: 33.7 NG/ML (ref 30–100)
CHOLEST SERPL-MCNC: 214 MG/DL (ref 50–200)
EST. AVERAGE GLUCOSE BLD GHB EST-MCNC: 114 MG/DL
HBA1C MFR BLD: 5.6 %
HDLC SERPL-MCNC: 62 MG/DL
LDLC SERPL CALC-MCNC: 140 MG/DL (ref 0–100)
NONHDLC SERPL-MCNC: 152 MG/DL
TRIGL SERPL-MCNC: 60 MG/DL

## 2020-06-13 PROCEDURE — 82306 VITAMIN D 25 HYDROXY: CPT

## 2020-06-13 PROCEDURE — 36415 COLL VENOUS BLD VENIPUNCTURE: CPT

## 2020-06-13 PROCEDURE — 80061 LIPID PANEL: CPT

## 2020-06-13 PROCEDURE — 83036 HEMOGLOBIN GLYCOSYLATED A1C: CPT

## 2020-07-31 ENCOUNTER — OFFICE VISIT (OUTPATIENT)
Dept: INTERNAL MEDICINE CLINIC | Facility: CLINIC | Age: 30
End: 2020-07-31
Payer: COMMERCIAL

## 2020-07-31 VITALS
RESPIRATION RATE: 16 BRPM | DIASTOLIC BLOOD PRESSURE: 62 MMHG | HEART RATE: 67 BPM | SYSTOLIC BLOOD PRESSURE: 120 MMHG | BODY MASS INDEX: 21.92 KG/M2 | HEIGHT: 65 IN | WEIGHT: 131.6 LBS | OXYGEN SATURATION: 100 % | TEMPERATURE: 98 F

## 2020-07-31 DIAGNOSIS — Z13.6 SCREENING FOR CARDIOVASCULAR CONDITION: Primary | ICD-10-CM

## 2020-07-31 DIAGNOSIS — Z13.1 SCREENING FOR DIABETES MELLITUS: ICD-10-CM

## 2020-07-31 DIAGNOSIS — E55.9 VITAMIN D DEFICIENCY: ICD-10-CM

## 2020-07-31 PROCEDURE — 99395 PREV VISIT EST AGE 18-39: CPT | Performed by: INTERNAL MEDICINE

## 2020-07-31 NOTE — PROGRESS NOTES
Assessment/Plan:    Wellness examination  Assessment and plan 1  Health maintenance annual wellness examination overall the patient is clinically stable and doing well, we encouraged the patient to follow a healthy and balanced diet  We recommend that the patient exercise routinely approximately 30 minutes 5 times per week   We have reviewed the patient's vaccines and have made recommendations for updates if necessary   annual flu vaccine     We will be ordering screening laboratories which are age appropriate  Return to the office in   1 year   call if any problems  Problem List Items Addressed This Visit        Other    Vitamin D deficiency    Relevant Orders    Vitamin D 25 hydroxy      Other Visit Diagnoses     Screening for cardiovascular condition    -  Primary    Relevant Orders    Comprehensive metabolic panel    Lipid Panel with Direct LDL reflex    Screening for diabetes mellitus        Relevant Orders    Hemoglobin A1C            Subjective:      Patient ID: Sreedhar Martino is a 26 y o  female  HPI 68-year-old female coming in for an annual wellness examination overall she is doing very well she drives a car safely wears a seatbelt she has a smoke alarm, fire extinguisher within her household  She wears a sunscreen  She sees OBGYN for routine examination  She gets yearly flu shot at work  She sees a dentist twice yearly and brushes/losses her teeth  The following portions of the patient's history were reviewed and updated as appropriate: allergies, current medications, past family history, past medical history, past social history, past surgical history and problem list     Review of Systems   Constitutional: Negative for activity change, appetite change and unexpected weight change  Respiratory: Negative for cough and shortness of breath  Cardiovascular: Negative for chest pain  Gastrointestinal: Negative for abdominal pain, diarrhea, nausea and vomiting  Objective:    No follow-ups on file  No results found  No Known Allergies    Past Medical History:   Diagnosis Date    Anorexia     resolved    Anxiety     Asthma     Migraine     As Child/Resolved    Normal delivery     2013 daughter, 2014 daughter, 2016 daughter    PONV (postoperative nausea and vomiting)     Varicella     As child     Past Surgical History:   Procedure Laterality Date    ARM SKIN LESION BIOPSY / EXCISION Right     MYRINGOTOMY W/ TUBES      WISDOM TOOTH EXTRACTION       Current Outpatient Medications on File Prior to Visit   Medication Sig Dispense Refill    Cholecalciferol (VITAMIN D) 2000 units CAPS Take by mouth       No current facility-administered medications on file prior to visit        Family History   Problem Relation Age of Onset    Uterine cancer Mother 48    Prostate cancer Father 62    Diabetes Maternal Grandmother     Stroke Maternal Grandmother     Cancer Maternal Grandfather         lung    Heart disease Paternal Grandfather     No Known Problems Daughter     No Known Problems Daughter     No Known Problems Daughter     No Known Problems Daughter      Social History     Socioeconomic History    Marital status: /Civil Union     Spouse name: Not on file    Number of children: Not on file    Years of education: Not on file    Highest education level: Not on file   Occupational History    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   Social Needs    Financial resource strain: Not on file    Food insecurity     Worry: Not on file     Inability: Not on file    Transportation needs     Medical: Not on file     Non-medical: Not on file   Tobacco Use    Smoking status: Never Smoker    Smokeless tobacco: Never Used   Substance and Sexual Activity    Alcohol use: Yes     Frequency: 2-4 times a month     Drinks per session: 1 or 2     Binge frequency: Never     Comment: social    Drug use: No    Sexual activity: Yes     Partners: Male     Birth control/protection: Other     Comment: withdrawl   Lifestyle    Physical activity     Days per week: Not on file     Minutes per session: Not on file    Stress: Not on file   Relationships    Social connections     Talks on phone: Not on file     Gets together: Not on file     Attends Mormonism service: Not on file     Active member of club or organization: Not on file     Attends meetings of clubs or organizations: Not on file     Relationship status: Not on file    Intimate partner violence     Fear of current or ex partner: Not on file     Emotionally abused: Not on file     Physically abused: Not on file     Forced sexual activity: Not on file   Other Topics Concern    Not on file   Social History Narrative    Not on file     Vitals:    07/31/20 1136   BP: 120/62   Pulse: 67   Resp: 16   Temp: 98 °F (36 7 °C)   TempSrc: Temporal   SpO2: 100%   Weight: 59 7 kg (131 lb 9 6 oz)   Height: 5' 5"     Results for orders placed or performed in visit on 06/13/20   Vitamin D 25 hydroxy   Result Value Ref Range    Vit D, 25-Hydroxy 33 7 30 0 - 100 0 ng/mL   Hemoglobin A1C   Result Value Ref Range    Hemoglobin A1C 5 6 Normal 3 8-5 6%; PreDiabetic 5 7-6 4%; Diabetic >=6 5%; Glycemic control for adults with diabetes <7 0% %     mg/dl   Lipid panel   Result Value Ref Range    Cholesterol 214 (H) 50 - 200 mg/dL    Triglycerides 60 <=150 mg/dL    HDL, Direct 62 >=40 mg/dL    LDL Calculated 140 (H) 0 - 100 mg/dL    Non-HDL-Chol (CHOL-HDL) 152 mg/dl     Weight (last 2 days)     Date/Time   Weight    07/31/20 1136   59 7 (131 6)            Body mass index is 21 9 kg/m²    BP      Temp      Pulse     Resp      SpO2        Vitals:    07/31/20 1136   Weight: 59 7 kg (131 lb 9 6 oz)     Vitals:    07/31/20 1136   Weight: 59 7 kg (131 lb 9 6 oz)       /62   Pulse 67   Temp 98 °F (36 7 °C) (Temporal)   Resp 16   Ht 5' 5"   Wt 59 7 kg (131 lb 9 6 oz)   SpO2 100%   BMI 21 90 kg/m²          Physical Exam   Constitutional: She appears well-developed  HENT:   Head: Normocephalic  Eyes: Pupils are equal, round, and reactive to light  Conjunctivae are normal  Right eye exhibits no discharge  Left eye exhibits no discharge  No scleral icterus  Neck: Neck supple  Cardiovascular: Normal rate, regular rhythm and normal heart sounds  Exam reveals no gallop and no friction rub  No murmur heard  Pulmonary/Chest: Breath sounds normal  No respiratory distress  She has no wheezes  She has no rales  Abdominal: Soft  Bowel sounds are normal  She exhibits no distension and no mass  There is no abdominal tenderness  There is no rebound and no guarding  Musculoskeletal:         General: No deformity  Lymphadenopathy:     She has no cervical adenopathy  Neurological: She is alert   Coordination normal

## 2020-08-02 PROBLEM — Z00.00 WELLNESS EXAMINATION: Status: ACTIVE | Noted: 2020-08-02

## 2020-08-02 NOTE — ASSESSMENT & PLAN NOTE
Assessment and plan 1  Health maintenance annual wellness examination overall the patient is clinically stable and doing well, we encouraged the patient to follow a healthy and balanced diet  We recommend that the patient exercise routinely approximately 30 minutes 5 times per week   We have reviewed the patient's vaccines and have made recommendations for updates if necessary   annual flu vaccine     We will be ordering screening laboratories which are age appropriate  Return to the office in   1 year   call if any problems

## 2020-12-21 ENCOUNTER — IMMUNIZATIONS (OUTPATIENT)
Dept: FAMILY MEDICINE CLINIC | Facility: HOSPITAL | Age: 30
End: 2020-12-21
Payer: COMMERCIAL

## 2020-12-21 DIAGNOSIS — Z23 ENCOUNTER FOR IMMUNIZATION: ICD-10-CM

## 2020-12-21 PROCEDURE — 91300 SARS-COV-2 / COVID-19 MRNA VACCINE (PFIZER-BIONTECH) 30 MCG: CPT

## 2020-12-21 PROCEDURE — 0001A SARS-COV-2 / COVID-19 MRNA VACCINE (PFIZER-BIONTECH) 30 MCG: CPT

## 2021-01-11 ENCOUNTER — IMMUNIZATIONS (OUTPATIENT)
Dept: FAMILY MEDICINE CLINIC | Facility: HOSPITAL | Age: 31
End: 2021-01-11

## 2021-01-11 DIAGNOSIS — Z23 ENCOUNTER FOR IMMUNIZATION: ICD-10-CM

## 2021-01-11 PROCEDURE — 0002A SARS-COV-2 / COVID-19 MRNA VACCINE (PFIZER-BIONTECH) 30 MCG: CPT

## 2021-01-11 PROCEDURE — 91300 SARS-COV-2 / COVID-19 MRNA VACCINE (PFIZER-BIONTECH) 30 MCG: CPT

## 2021-06-26 ENCOUNTER — OFFICE VISIT (OUTPATIENT)
Dept: URGENT CARE | Age: 31
End: 2021-06-26
Payer: COMMERCIAL

## 2021-06-26 ENCOUNTER — APPOINTMENT (OUTPATIENT)
Dept: RADIOLOGY | Age: 31
End: 2021-06-26
Attending: NURSE PRACTITIONER
Payer: COMMERCIAL

## 2021-06-26 VITALS
OXYGEN SATURATION: 100 % | BODY MASS INDEX: 23.32 KG/M2 | HEIGHT: 65 IN | SYSTOLIC BLOOD PRESSURE: 113 MMHG | TEMPERATURE: 98.3 F | WEIGHT: 140 LBS | DIASTOLIC BLOOD PRESSURE: 75 MMHG | RESPIRATION RATE: 18 BRPM | HEART RATE: 92 BPM

## 2021-06-26 DIAGNOSIS — S92.514A CLOSED NONDISPLACED FRACTURE OF PROXIMAL PHALANX OF LESSER TOE OF RIGHT FOOT, INITIAL ENCOUNTER: Primary | ICD-10-CM

## 2021-06-26 DIAGNOSIS — M79.674 PAIN IN TOE OF RIGHT FOOT: ICD-10-CM

## 2021-06-26 PROCEDURE — 73660 X-RAY EXAM OF TOE(S): CPT

## 2021-06-26 PROCEDURE — G0382 LEV 3 HOSP TYPE B ED VISIT: HCPCS | Performed by: NURSE PRACTITIONER

## 2021-06-26 NOTE — PROGRESS NOTES
[facility]        NAME: Jose De Jesus Pedro is a 26 y o  female  : 1990    MRN: [id number]  DATE: 2021  TIME: 9:02 AM    Assessment and Plan   Closed nondisplaced fracture of proximal phalanx of lesser toe of right foot, initial encounter [S92 514A]  1  Closed nondisplaced fracture of proximal phalanx of lesser toe of right foot, initial encounter     2  Pain in toe of right foot  XR toe right fifth min 2 views    5th          Patient Instructions       Follow up with PCP in 3-5 days  Proceed to  ER if symptoms worsen  Your xray was preliminarily read by your provider  A radiologist will read the xray and you will be notified  You are to Rest, Ice, compression (ace wrap, splint) elevate  Take tylenol or motrin as needed  You are to see your PCP   Go to the ED if symptoms worsen  You are to wear the post op shoe to keep the toe from bending/flexing  You are to call podiatry for an appointment          Chief Complaint     Chief Complaint   Patient presents with    Toe Pain     hurt right 5th toe 8 days ago while running in [place] and hit off a brick          History of Present Illness       This is a 26year old female who comes to [facility] with c/o right little toe pain x 8 days after hitting it on a brick  She states she has applied ice, wrapped ace wrap and it is still painful  She has not taken anything and has not buddy tapped it  She has not seen anyone for it  Toe Pain         Review of Systems   Review of Systems   Constitutional: Negative  HENT: Negative  Eyes: Negative  Respiratory: Negative  Cardiovascular: Negative  Gastrointestinal: Negative  Endocrine: Negative  Genitourinary: Negative  Musculoskeletal:        Right little toe pain and swelling    Skin: Negative  Allergic/Immunologic: Negative  Neurological: Negative  Hematological: Negative  Psychiatric/Behavioral: Negative            Current Medications       Current Outpatient Medications:     Cholecalciferol (VITAMIN D) 2000 units CAPS, Take by mouth, Disp: , Rfl:     Current Allergies     Allergies as of 06/26/2021    (No Known Allergies)            The following portions of the patient's history were reviewed and updated as appropriate: allergies, current medications, past family history, past medical history, past social history, past surgical history and problem list      Past Medical History:   Diagnosis Date    Anorexia     resolved    Anxiety     Asthma     Migraine     As Child/Resolved    Normal delivery     2013 daughter, 200 daughter, 2016 daughter    PONV (postoperative nausea and vomiting)     Varicella     As child       Past Surgical History:   Procedure Laterality Date    ARM SKIN LESION BIOPSY / EXCISION Right     MYRINGOTOMY W/ TUBES      WISDOM TOOTH EXTRACTION         Family History   Problem Relation Age of Onset    Uterine cancer Mother 48    Prostate cancer Father 62    Diabetes Maternal Grandmother     Stroke Maternal Grandmother     Cancer Maternal Grandfather         lung    Heart disease Paternal Grandfather     No Known Problems Daughter     No Known Problems Daughter     No Known Problems Daughter     No Known Problems Daughter          Medications have been verified  Objective   /75   Pulse 92   Temp 98 3 °F (36 8 °C)   Resp 18   Ht 5' 5" (1 651 m)   Wt 63 5 kg (140 lb)   SpO2 100%   BMI 23 30 kg/m²   No LMP recorded  Physical Exam     Physical Exam  Vitals and nursing note reviewed  Constitutional:       General: She is not in acute distress  Appearance: Normal appearance  She is normal weight  She is not ill-appearing, toxic-appearing or diaphoretic  HENT:      Head: Normocephalic and atraumatic  Eyes:      Extraocular Movements: Extraocular movements intact  Cardiovascular:      Rate and Rhythm: Normal rate  Pulses: Normal pulses     Pulmonary:      Effort: Pulmonary effort is normal  Musculoskeletal:         General: Swelling, tenderness and signs of injury present  Normal range of motion  Cervical back: Normal range of motion  Comments: Right little toe is swollen, no deformity  TTP   Healing abrasion noted   + pedal pulse    Skin:     General: Skin is warm and dry  Capillary Refill: Capillary refill takes less than 2 seconds  Neurological:      General: No focal deficit present  Mental Status: She is alert and oriented to person, place, and time  Psychiatric:         Mood and Affect: Mood normal          Behavior: Behavior normal          Thought Content: Thought content normal          Judgment: Judgment normal          Preliminary reading  Avulsion fracture base of 5th toe  ? Fracture proximal metatarsal 5th toe  Waiting on rad read    Will place in post op shoe and have pt follow up with podiatry        Orthopedic injury treatment    Date/Time: 6/26/2021 8:58 AM  Performed by: Olivia Fields  Authorized by: SNEHAL Fields     Patient Location:  Clinic  Other Assisting Provider: Yes (comment) Vivian Warren LPN)    Verbal consent obtained?: Yes    Risks and benefits: Risks, benefits and alternatives were discussed    Consent given by:  Patient  Patient states understanding of procedure being performed: Yes    Patient's understanding of procedure matches consent: Yes    Procedure consent matches procedure scheduled: Yes    Relevant documents present and verified: Yes    Test results available and properly labeled: Yes    Site marked: Yes    Radiology Images displayed and confirmed   If images not available, report reviewed: Yes    Required items: Required blood products, implants, devices and special equipment available    Patient identity confirmed:  Verbally with patient and hospital-assigned identification number  Time out: Immediately prior to the procedure a time out was called    Injury location:  Toe  Location details:  Right fifth toe  Injury type: Fracture  Neurovascular status: Neurovascularly intact    Distal perfusion: normal    Neurological function: normal    Range of motion: normal    Local anesthesia used?: No    Manipulation performed?: No    Immobilization: post op shoe   Neurovascular status: Neurovascularly intact    Distal perfusion: normal    Neurological function: normal    Range of motion comment:  Limited with post op shoe   Patient tolerance:  Patient tolerated the procedure well with no immediate complications

## 2021-06-26 NOTE — PATIENT INSTRUCTIONS
Your xray was preliminarily read by your provider  A radiologist will read the xray and you will be notified  You are to Rest, Ice, compression (ace wrap, splint) elevate  Take tylenol or motrin as needed  You are to see your PCP   Go to the ED if symptoms worsen  You are to wear the post op shoe to keep the toe from bending/flexing  You are to call podiatry for an appointment    Toe Fracture   WHAT YOU NEED TO KNOW:   A toe fracture is a break in a bone in your toe  DISCHARGE INSTRUCTIONS:   Return to the emergency department if:   · Blood soaks through your bandage  · You have severe pain in your toe  · Your toe is cold or numb  Call your doctor if:   · You have a fever  · Your pain does not go away, even after treatment  · Your toe continues to hurt even after it has healed  · You have questions or concerns about your condition or care  Medicines: You may need any of the following:  · NSAIDs , such as ibuprofen, help decrease swelling, pain, and fever  This medicine is available with or without a doctor's order  NSAIDs can cause stomach bleeding or kidney problems in certain people  If you take blood thinner medicine, always ask your healthcare provider if NSAIDs are safe for you  Always read the medicine label and follow directions  · Prescription pain medicine  may be given  Ask your healthcare provider how to take this medicine safely  Some prescription pain medicines contain acetaminophen  Do not take other medicines that contain acetaminophen without talking to your healthcare provider  Too much acetaminophen may cause liver damage  Prescription pain medicine may cause constipation  Ask your healthcare provider how to prevent or treat constipation  · Antibiotics  treat a bacterial infection  You may need antibiotics if you have an open wound  · Take your medicine as directed    Contact your healthcare provider if you think your medicine is not helping or if you have side effects  Tell him of her if you are allergic to any medicine  Keep a list of the medicines, vitamins, and herbs you take  Include the amounts, and when and why you take them  Bring the list or the pill bottles to follow-up visits  Carry your medicine list with you in case of an emergency  Self-care:   · Rest  your toe so that it can heal  Return to normal activities as directed  · Apply ice  on your toe for 15 to 20 minutes every hour or as directed  Use an ice pack, or put crushed ice in a plastic bag  Cover it with a towel before you put it on your toe  Ice helps prevent tissue damage and decreases swelling and pain  · Elevate  your toe above the level of your heart as often as you can  This will help decrease swelling and pain  Prop your toe on pillows or blankets to keep it elevated comfortably  · Use chauncey tape, an elastic bandage, or a splint  as directed  These help keep your toe in its correct position as it heals  Chauncey tape means your fractured toe and the toe next to it are taped together  · Use a support device  such as a cane, crutches, walking boot, or hard soled shoe as directed  These help protect your toe and limit movement so it can heal        Follow up with your doctor as directed: You may need to return in 2 to 4 weeks  Write down your questions so you remember to ask them during your visits  © Copyright [address] Information is for End User's use only and may not be sold, redistributed or otherwise used for commercial purposes  All illustrations and images included in CareNotes® are the copyrighted property of A D A M , Inc  or [facility] Felix Regalado   The above information is an  only  It is not intended as medical advice for individual conditions or treatments  Talk to your doctor, nurse or pharmacist before following any medical regimen to see if it is safe and effective for you

## 2021-07-22 ENCOUNTER — ANNUAL EXAM (OUTPATIENT)
Dept: OBGYN CLINIC | Facility: CLINIC | Age: 31
End: 2021-07-22
Payer: COMMERCIAL

## 2021-07-22 VITALS
SYSTOLIC BLOOD PRESSURE: 110 MMHG | WEIGHT: 138 LBS | HEIGHT: 65 IN | DIASTOLIC BLOOD PRESSURE: 60 MMHG | BODY MASS INDEX: 22.99 KG/M2

## 2021-07-22 DIAGNOSIS — Z01.419 ENCOUNTER FOR GYNECOLOGICAL EXAMINATION (GENERAL) (ROUTINE) WITHOUT ABNORMAL FINDINGS: Primary | ICD-10-CM

## 2021-07-22 DIAGNOSIS — N64.59 BREAST THICKENING: ICD-10-CM

## 2021-07-22 PROBLEM — Z00.00 WELLNESS EXAMINATION: Status: RESOLVED | Noted: 2020-08-02 | Resolved: 2021-07-22

## 2021-07-22 PROCEDURE — 99395 PREV VISIT EST AGE 18-39: CPT | Performed by: OBSTETRICS & GYNECOLOGY

## 2021-07-22 NOTE — PROGRESS NOTES
Chance Haddad  1990      CC:  Yearly exam    S:  33 y o  female here for yearly exam  Her cycles are regular, not heavy or crampy  The recent cycles have been more like 32-33 days instead of 28  She uses withdrawal for most of the cycle so this change has not affected anything  She and her  are still not sure that they are done having children though they are getting closer  He will get a vasectomy when they are done  She declines contraception at this time  She has noticed some breast tenderness in her right upper outer quadrant  She feels some subtle changes there but no discrete mass  She had mastitis twice in that area with her youngest so she is uncertain if that is the cause of the change or discomfort or if it is just in her head  Sexual activity: She is sexually active without pain, bleeding or dryness  Contraception: She uses withdrawal and natural family planning for contraception  Last Pap 6/5/2019 - normal/negative HPV  Last Mammo - never    We reviewed [facility] guidelines for Pap testing today         Current Outpatient Medications:     Cholecalciferol (VITAMIN D) 2000 units CAPS, Take by mouth, Disp: , Rfl:   Social History     Socioeconomic History    Marital status: /Civil Union     Spouse name: Not on file    Number of children: Not on file    Years of education: Not on file    Highest education level: Not on file   Occupational History    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   Tobacco Use    Smoking status: Never Smoker    Smokeless tobacco: Never Used   Vaping Use    Vaping Use: Never used   Substance and Sexual Activity    Alcohol use: Yes     Comment: social    Drug use: No    Sexual activity: Yes     Partners: Male     Birth control/protection: Coitus interruptus     Comment: withdrawl   Other Topics Concern    Not on file   Social History Narrative    Not on file     Social Determinants of Health Financial Resource Strain:     Difficulty of Paying Living Expenses:    Food Insecurity:     Worried About Running Out of Food in the Last Year:     [address] in the Last Year:    Transportation Needs:     Lack of Transportation (Medical):  Lack of Transportation (Non-Medical):    Physical Activity:     Days of Exercise per Week:     Minutes of Exercise per Session:    Stress:     Feeling of Stress :    Social Connections:     Frequency of Communication with Friends and Family:     Frequency of Social Gatherings with Friends and Family:     Attends Sikhism Services:     Active Member of Clubs or Organizations:     Attends Club or Organization Meetings:     Marital Status:    Intimate Partner Violence:     Fear of Current or Ex-Partner:     Emotionally Abused:     Physically Abused:     Sexually Abused:      Family History   Problem Relation Age of Onset    Uterine cancer Mother 48    Prostate cancer Father 62    Diabetes Maternal Grandmother     Stroke Maternal Grandmother     Cancer Maternal Grandfather         lung    Heart disease Paternal Grandfather     No Known Problems Daughter     No Known Problems Daughter     No Known Problems Daughter     No Known Problems Daughter       Past Medical History:   Diagnosis Date    Anorexia     resolved    Anxiety     Asthma     Migraine     As Child/Resolved    Normal delivery     2013 daughter, 2014 daughter, 2016 daughter    PONV (postoperative nausea and vomiting)     Varicella     As child        Review of Systems   Respiratory: Negative  Cardiovascular: Negative  Gastrointestinal: Negative for constipation and diarrhea  Genitourinary: Negative for difficulty urinating, pelvic pain, vaginal bleeding, vaginal discharge, itching or odor  O:  Blood pressure 110/60, height 5' 5 3" (1 659 m), weight 62 6 kg (138 lb), last menstrual period 06/30/2021, not currently breastfeeding      Patient appears well and is not in distress  Neck is supple without masses  Breasts are overall symmetrical without mass, tenderness, nipple discharge, skin changes or adenopathy  Right breast with subtle thickening in upper outer quadrant toward axillary tail without discrete mass; this is the area that she reports is tender  Abdomen is soft and nontender without masses  External genitals are normal without lesions or rashes  Urethral meatus and urethra are normal  Bladder is normal to palpation  Vagina is normal without discharge or bleeding  Cervix is normal without discharge or lesion  Uterus is normal, mobile, nontender without palpable mass  Adnexa are normal, nontender, without palpable mass  A:  Yearly exam      P:   Pap due 2024   Diagnostic mammo and ultrasound slip provided    RTO one year for yearly exam or sooner as needed

## 2021-07-29 ENCOUNTER — HOSPITAL ENCOUNTER (OUTPATIENT)
Dept: NON INVASIVE DIAGNOSTICS | Facility: CLINIC | Age: 31
Discharge: HOME | End: 2021-07-29
Payer: COMMERCIAL

## 2021-07-29 DIAGNOSIS — R09.89 ABDOMINAL BRUIT: ICD-10-CM

## 2021-07-29 PROCEDURE — 93975 VASCULAR STUDY: CPT | Performed by: SURGERY

## 2021-07-29 PROCEDURE — 93975 VASCULAR STUDY: CPT

## 2021-08-03 ENCOUNTER — APPOINTMENT (OUTPATIENT)
Dept: LAB | Age: 31
End: 2021-08-03
Payer: COMMERCIAL

## 2021-08-03 ENCOUNTER — HOSPITAL ENCOUNTER (OUTPATIENT)
Dept: RADIOLOGY | Age: 31
Discharge: HOME | End: 2021-08-03
Payer: COMMERCIAL

## 2021-08-03 DIAGNOSIS — Z00.8 HEALTH EXAMINATION IN POPULATION SURVEYS: ICD-10-CM

## 2021-08-03 DIAGNOSIS — N28.89 KIDNEY MASS: ICD-10-CM

## 2021-08-03 DIAGNOSIS — Z00.8 ENCOUNTER FOR OTHER GENERAL EXAMINATION: ICD-10-CM

## 2021-08-03 LAB
CHOLEST SERPL-MCNC: 194 MG/DL (ref 50–200)
EST. AVERAGE GLUCOSE BLD GHB EST-MCNC: 117 MG/DL
HBA1C MFR BLD: 5.7 %
HDLC SERPL-MCNC: 65 MG/DL
LDLC SERPL CALC-MCNC: 116 MG/DL (ref 0–100)
NONHDLC SERPL-MCNC: 129 MG/DL
TRIGL SERPL-MCNC: 66 MG/DL

## 2021-08-03 PROCEDURE — 36415 COLL VENOUS BLD VENIPUNCTURE: CPT

## 2021-08-03 PROCEDURE — 83036 HEMOGLOBIN GLYCOSYLATED A1C: CPT

## 2021-08-03 PROCEDURE — 80061 LIPID PANEL: CPT

## 2021-08-03 PROCEDURE — 76770 US EXAM ABDO BACK WALL COMP: CPT

## 2021-08-06 ENCOUNTER — HOSPITAL ENCOUNTER (OUTPATIENT)
Dept: MAMMOGRAPHY | Facility: CLINIC | Age: 31
Discharge: HOME | End: 2021-08-06
Payer: COMMERCIAL

## 2021-08-06 ENCOUNTER — HOSPITAL ENCOUNTER (OUTPATIENT)
Dept: ULTRASOUND IMAGING | Facility: CLINIC | Age: 31
Discharge: HOME | End: 2021-08-06
Payer: COMMERCIAL

## 2021-08-06 VITALS — BODY MASS INDEX: 22.99 KG/M2 | WEIGHT: 138 LBS | HEIGHT: 65 IN

## 2021-08-06 DIAGNOSIS — N64.59 BREAST THICKENING: ICD-10-CM

## 2021-08-06 PROCEDURE — 76642 ULTRASOUND BREAST LIMITED: CPT

## 2021-08-06 PROCEDURE — G0279 TOMOSYNTHESIS, MAMMO: HCPCS

## 2021-08-06 PROCEDURE — 77066 DX MAMMO INCL CAD BI: CPT

## 2021-09-04 ENCOUNTER — NURSE TRIAGE (OUTPATIENT)
Dept: OTHER | Facility: OTHER | Age: 31
End: 2021-09-04

## 2021-09-04 NOTE — TELEPHONE ENCOUNTER
Regarding: Bleeding after pregnancy  ----- Message from Pete Suazo sent at 9/4/2021  8:00 AM EDT -----  "I made an appointment yesterday after having three positive pregnancy tests and last night I started bleeding as if I am on my period and I am still bleeding now "

## 2021-09-04 NOTE — TELEPHONE ENCOUNTER
Reason for Disposition   [1] MODERATE vaginal bleeding (e g , soaking 1 pad per hour; clots) AND [2] present > 6 hours    Answer Assessment - Initial Assessment Questions  1  ONSET: "When did this bleeding start?"        Last night     2  DESCRIPTION: "Describe the bleeding that you are having " "How much bleeding is there?"     - SPOTTING: spotting, or pinkish / brownish mucous discharge; does not fill panti-liner or pad     - MILD:  less than 1 pad / hour; less than patient's usual menstrual bleeding    - MODERATE: 1-2 pads / hour; 1 menstrual cup every 6 hours; small-medium blood clots (e g , pea, grape, small coin)    - SEVERE: soaking 2 or more pads/hour for 2 or more hours; 1 menstrual cup every 2 hours; bleeding not contained by pads or continuous red blood from vagina; large blood clots (e g , golf ball, large coin)       Like a period  A few clots  Moderate     3  ABDOMINAL PAIN SEVERITY: If present, ask: "How bad is it?"  (e g , Scale 1-10; mild, moderate, or severe)    - MILD (1-3): doesn't interfere with normal activities, abdomen soft and not tender to touch     - MODERATE (4-7): interferes with normal activities or awakens from sleep, tender to touch     - SEVERE (8-10): excruciating pain, doubled over, unable to do any normal activities      Denies     4  PREGNANCY: "Do you know how many weeks or months pregnant you are?" "When was the first day of your last normal menstrual period?"      LMP 7/31/21, about 5 weeks     5  HEMODYNAMIC STATUS: "Are you weak or feeling lightheaded?" If Yes, ask: "Can you stand and walk normally?"       Denies     6   OTHER SYMPTOMS: "What other symptoms are you having with the bleeding?" (e g , passed tissue, vaginal discharge, fever, menstrual-type cramps)      Menstrual-type cramping    Protocols used: PREGNANCY - VAGINAL BLEEDING LESS THAN 20 WEEKS [facility]

## 2021-09-04 NOTE — TELEPHONE ENCOUNTER
[facility] sent to on-call provider, Dr Dorothy Garrido, regarding patient's bleeding at about 5 weeks pregnant  Per Dr Dorothy Garrido, she will follow up with patient directly  Patient's contact number given to provider

## 2021-09-12 ENCOUNTER — OFFICE VISIT (OUTPATIENT)
Dept: URGENT CARE | Age: 31
End: 2021-09-12
Payer: COMMERCIAL

## 2021-09-12 VITALS — OXYGEN SATURATION: 99 % | TEMPERATURE: 97.3 F | HEART RATE: 81 BPM | RESPIRATION RATE: 20 BRPM

## 2021-09-12 DIAGNOSIS — J02.9 SORE THROAT: Primary | ICD-10-CM

## 2021-09-12 LAB — S PYO AG THROAT QL: NEGATIVE

## 2021-09-12 PROCEDURE — G0382 LEV 3 HOSP TYPE B ED VISIT: HCPCS | Performed by: NURSE PRACTITIONER

## 2021-09-12 PROCEDURE — 87070 CULTURE OTHR SPECIMN AEROBIC: CPT | Performed by: NURSE PRACTITIONER

## 2021-09-12 PROCEDURE — 87880 STREP A ASSAY W/OPTIC: CPT | Performed by: NURSE PRACTITIONER

## 2021-09-12 NOTE — PROGRESS NOTES
[facility]        NAME: Swati Luna is a 33 y o  female  : 1990    MRN: [id number]  DATE: 2021  TIME: 6:46 PM    Assessment and Plan   Sore throat [J02 9]  1  Sore throat  POCT rapid strepA    Throat culture         Patient Instructions     rapid strep is negative; will send for culture  Follow up with PCP in 3-5 days  Proceed to  ER if symptoms worsen  Chief Complaint     Chief Complaint   Patient presents with    Sore Throat     pt states symptoms started yesterday, multiple family members have strep, they also all tested negative for covid, pt is vaccinated    Headache         History of Present Illness       HPI   Reports sore throat and HA  Children diagnosed with strep today  She is fully vaccinated against covid  Does not want the covid test  Requesting testing for strep    Review of Systems   Review of Systems   Constitutional: Negative for chills and fever  HENT: Positive for sore throat  Respiratory: Negative for cough and wheezing  Gastrointestinal: Negative for diarrhea and vomiting  Neurological: Positive for headaches           Current Medications       Current Outpatient Medications:     Cholecalciferol (VITAMIN D) 2000 units CAPS, Take by mouth, Disp: , Rfl:     Current Allergies     Allergies as of 2021    (No Known Allergies)            The following portions of the patient's history were reviewed and updated as appropriate: allergies, current medications, past family history, past medical history, past social history, past surgical history and problem list      Past Medical History:   Diagnosis Date    Anorexia     resolved    Anxiety     Asthma     Migraine     As Child/Resolved    Normal delivery     2013 daughter, 2014 daughter, 2016 daughter    PONV (postoperative nausea and vomiting)     Varicella     As child       Past Surgical History:   Procedure Laterality Date    ARM SKIN LESION BIOPSY / EXCISION Right     MYRINGOTOMY W/ TUBES      WISDOM TOOTH EXTRACTION         Family History   Problem Relation Age of Onset    Uterine cancer Mother 48    Prostate cancer Father 62    Diabetes Maternal Grandmother     Stroke Maternal Grandmother     Cancer Maternal Grandfather         lung    Lung cancer Maternal Grandfather     Heart disease Paternal Grandfather     No Known Problems Daughter     No Known Problems Daughter     No Known Problems Daughter     No Known Problems Daughter          Medications have been verified  Objective   Pulse 81   Temp (!) 97 3 °F (36 3 °C)   Resp 20   LMP 09/02/2021   SpO2 99%   Patient's last menstrual period was 09/02/2021  Physical Exam     Physical Exam  Constitutional:       Appearance: She is not ill-appearing or diaphoretic  HENT:      Right Ear: Tympanic membrane and ear canal normal       Left Ear: Tympanic membrane and ear canal normal       Nose: No rhinorrhea  Mouth/Throat:      Mouth: Mucous membranes are moist       Pharynx: No pharyngeal swelling or posterior oropharyngeal erythema  Tonsils: No tonsillar exudate  0 on the right  0 on the left  Cardiovascular:      Rate and Rhythm: Regular rhythm  Pulmonary:      Effort: Pulmonary effort is normal       Breath sounds: Normal breath sounds

## 2021-09-12 NOTE — PATIENT INSTRUCTIONS
Sore Throat, Ambulatory Care   GENERAL INFORMATION:   A sore throat  is often caused by a cold or flu virus  A sore throat may also be caused by bacteria such as strep  Other causes include smoking, a runny nose, allergies, or acid reflux  Seek immediate care for the following symptoms:   · Trouble breathing or swallowing because your throat is swollen or sore    · Drooling because it hurts too much to swallow    · A painful lump in your throat that does not go away after 5 days    · A fever higher than 102? F (39?C) or lasts longer than 3 days    · Confusion    · Blood in your throat or ear  Treatment for a sore throat  will depend on the cause how severe it is  A sore throat cause by a virus will go away on its own without treatment  You will need antibiotics if your sore throat is caused by bacteria  Your sore throat should start to feel better within 3 to 5 days for both viral and bacterial infections  Care for your sore throat:   · Gargle with salt water  Mix ¼ teaspoon salt in a glass of warm water and gargle  This may help reduce swelling in your throat  · Take ibuprofen or acetaminophen:  These medicines decrease pain and fever  They are available without a doctor's order  Ask your healthcare provider which medicine is best for you  Ask how much to take and how often to take it  · Drink more liquids  Cold or warm drinks may help soothe your sore throat  Drinking liquids can also help prevent dehydration  · Use a cool-steam humidifier  to help moisten the air in your room and reduce your throat pain  · Use lozenges, ice, soft foods, or popsicles  to soothe your throat  · Rest your throat as much as possible  Try not to use your voice  This may irritate your throat and worsen your symptoms  Follow up with your healthcare provider as directed:  Write down your questions so you remember to ask them during your visits  CARE AGREEMENT:   You have the right to help plan your care   Learn about your health condition and how it may be treated  Discuss treatment options with your caregivers to decide what care you want to receive  You always have the right to refuse treatment  The above information is an  only  It is not intended as medical advice for individual conditions or treatments  Talk to your doctor, nurse or pharmacist before following any medical regimen to see if it is safe and effective for you  © 2014 [address] is for End User's use only and may not be sold, redistributed or otherwise used for commercial purposes  All illustrations and images included in CareNotes® are the copyrighted property of A D A M , Inc  or Pierre Elder

## 2021-09-14 LAB — BACTERIA THROAT CULT: NORMAL

## 2021-09-25 ENCOUNTER — IMMUNIZATIONS (OUTPATIENT)
Dept: FAMILY MEDICINE CLINIC | Facility: HOSPITAL | Age: 31
End: 2021-09-25

## 2021-09-25 DIAGNOSIS — Z23 ENCOUNTER FOR IMMUNIZATION: Primary | ICD-10-CM

## 2021-09-25 PROCEDURE — 0001A SARS-COV-2 / COVID-19 MRNA VACCINE (PFIZER-BIONTECH) 30 MCG: CPT

## 2021-09-25 PROCEDURE — 91300 SARS-COV-2 / COVID-19 MRNA VACCINE (PFIZER-BIONTECH) 30 MCG: CPT

## 2021-10-01 ENCOUNTER — OFFICE VISIT (OUTPATIENT)
Dept: INTERNAL MEDICINE CLINIC | Facility: CLINIC | Age: 31
End: 2021-10-01
Payer: COMMERCIAL

## 2021-10-01 VITALS
SYSTOLIC BLOOD PRESSURE: 110 MMHG | RESPIRATION RATE: 16 BRPM | WEIGHT: 138.2 LBS | TEMPERATURE: 98 F | HEIGHT: 65 IN | BODY MASS INDEX: 23.03 KG/M2 | OXYGEN SATURATION: 99 % | HEART RATE: 67 BPM | DIASTOLIC BLOOD PRESSURE: 80 MMHG

## 2021-10-01 DIAGNOSIS — R73.03 PREDIABETES: ICD-10-CM

## 2021-10-01 DIAGNOSIS — Z00.00 ANNUAL PHYSICAL EXAM: Primary | ICD-10-CM

## 2021-10-01 DIAGNOSIS — Z00.00 WELLNESS EXAMINATION: ICD-10-CM

## 2021-10-01 DIAGNOSIS — E55.9 VITAMIN D DEFICIENCY: ICD-10-CM

## 2021-10-01 DIAGNOSIS — Z13.29 SCREENING FOR THYROID DISORDER: ICD-10-CM

## 2021-10-01 PROCEDURE — 99395 PREV VISIT EST AGE 18-39: CPT | Performed by: INTERNAL MEDICINE

## 2021-10-03 PROBLEM — Z00.00 WELLNESS EXAMINATION: Status: ACTIVE | Noted: 2021-10-03

## 2022-03-23 ENCOUNTER — APPOINTMENT (OUTPATIENT)
Dept: LAB | Facility: CLINIC | Age: 32
End: 2022-03-23

## 2022-03-23 ENCOUNTER — APPOINTMENT (OUTPATIENT)
Dept: LAB | Facility: CLINIC | Age: 32
End: 2022-03-23
Payer: COMMERCIAL

## 2022-03-23 DIAGNOSIS — E55.9 VITAMIN D DEFICIENCY: ICD-10-CM

## 2022-03-23 DIAGNOSIS — Z00.8 HEALTH EXAMINATION IN POPULATION SURVEY: ICD-10-CM

## 2022-03-23 DIAGNOSIS — R73.03 PREDIABETES: ICD-10-CM

## 2022-03-23 DIAGNOSIS — Z13.29 SCREENING FOR THYROID DISORDER: ICD-10-CM

## 2022-03-23 LAB
25(OH)D3 SERPL-MCNC: 30.1 NG/ML (ref 30–100)
ALBUMIN SERPL BCP-MCNC: 3.9 G/DL (ref 3.5–5)
ALP SERPL-CCNC: 51 U/L (ref 46–116)
ALT SERPL W P-5'-P-CCNC: 19 U/L (ref 12–78)
ANION GAP SERPL CALCULATED.3IONS-SCNC: 5 MMOL/L (ref 4–13)
AST SERPL W P-5'-P-CCNC: 13 U/L (ref 5–45)
BILIRUB SERPL-MCNC: 0.41 MG/DL (ref 0.2–1)
BUN SERPL-MCNC: 14 MG/DL (ref 5–25)
CALCIUM SERPL-MCNC: 9.7 MG/DL (ref 8.3–10.1)
CHLORIDE SERPL-SCNC: 104 MMOL/L (ref 100–108)
CHOLEST SERPL-MCNC: 216 MG/DL
CO2 SERPL-SCNC: 27 MMOL/L (ref 21–32)
CREAT SERPL-MCNC: 0.71 MG/DL (ref 0.6–1.3)
EST. AVERAGE GLUCOSE BLD GHB EST-MCNC: 105 MG/DL
GFR SERPL CREATININE-BSD FRML MDRD: 112 ML/MIN/1.73SQ M
GLUCOSE P FAST SERPL-MCNC: 96 MG/DL (ref 65–99)
HBA1C MFR BLD: 5.3 %
HDLC SERPL-MCNC: 65 MG/DL
LDLC SERPL CALC-MCNC: 137 MG/DL (ref 0–100)
NONHDLC SERPL-MCNC: 151 MG/DL
POTASSIUM SERPL-SCNC: 4.3 MMOL/L (ref 3.5–5.3)
PROT SERPL-MCNC: 7.9 G/DL (ref 6.4–8.2)
SODIUM SERPL-SCNC: 136 MMOL/L (ref 136–145)
TRIGL SERPL-MCNC: 72 MG/DL
TSH SERPL DL<=0.05 MIU/L-ACNC: 2.66 UIU/ML (ref 0.36–3.74)

## 2022-03-23 PROCEDURE — 80061 LIPID PANEL: CPT

## 2022-03-23 PROCEDURE — 80053 COMPREHEN METABOLIC PANEL: CPT

## 2022-03-23 PROCEDURE — 83036 HEMOGLOBIN GLYCOSYLATED A1C: CPT

## 2022-03-23 PROCEDURE — 82306 VITAMIN D 25 HYDROXY: CPT

## 2022-03-23 PROCEDURE — 36415 COLL VENOUS BLD VENIPUNCTURE: CPT

## 2022-03-23 PROCEDURE — 84443 ASSAY THYROID STIM HORMONE: CPT

## 2022-05-31 ENCOUNTER — OFFICE VISIT (OUTPATIENT)
Dept: INTERNAL MEDICINE CLINIC | Facility: CLINIC | Age: 32
End: 2022-05-31
Payer: COMMERCIAL

## 2022-05-31 VITALS
BODY MASS INDEX: 22.99 KG/M2 | DIASTOLIC BLOOD PRESSURE: 72 MMHG | HEART RATE: 92 BPM | HEIGHT: 65 IN | WEIGHT: 138 LBS | SYSTOLIC BLOOD PRESSURE: 122 MMHG | RESPIRATION RATE: 16 BRPM | OXYGEN SATURATION: 100 %

## 2022-05-31 DIAGNOSIS — R31.9 URINARY TRACT INFECTION WITH HEMATURIA, SITE UNSPECIFIED: Primary | ICD-10-CM

## 2022-05-31 DIAGNOSIS — N39.0 URINARY TRACT INFECTION WITH HEMATURIA, SITE UNSPECIFIED: Primary | ICD-10-CM

## 2022-05-31 LAB
BACTERIA UR QL AUTO: ABNORMAL /HPF
BILIRUB UR QL STRIP: NEGATIVE
CLARITY UR: CLEAR
COLOR UR: ABNORMAL
GLUCOSE UR STRIP-MCNC: NEGATIVE MG/DL
HGB UR QL STRIP.AUTO: ABNORMAL
KETONES UR STRIP-MCNC: NEGATIVE MG/DL
LEUKOCYTE ESTERASE UR QL STRIP: ABNORMAL
MUCOUS THREADS UR QL AUTO: ABNORMAL
NITRITE UR QL STRIP: NEGATIVE
NON-SQ EPI CELLS URNS QL MICRO: ABNORMAL /HPF
PH UR STRIP.AUTO: 6.5 [PH]
PROT UR STRIP-MCNC: NEGATIVE MG/DL
RBC #/AREA URNS AUTO: ABNORMAL /HPF
SL AMB  POCT GLUCOSE, UA: NEGATIVE
SL AMB LEUKOCYTE ESTERASE,UA: 3
SL AMB POCT BILIRUBIN,UA: NEGATIVE
SL AMB POCT BLOOD,UA: ABNORMAL
SL AMB POCT CLARITY,UA: ABNORMAL
SL AMB POCT COLOR,UA: YELLOW
SL AMB POCT KETONES,UA: NEGATIVE
SL AMB POCT NITRITE,UA: NEGATIVE
SL AMB POCT PH,UA: 6
SL AMB POCT SPECIFIC GRAVITY,UA: 1.02
SL AMB POCT URINE PROTEIN: NEGATIVE
SL AMB POCT UROBILINOGEN: 3.5
SP GR UR STRIP.AUTO: 1.01 (ref 1–1.03)
UROBILINOGEN UR STRIP-ACNC: <2 MG/DL
WBC #/AREA URNS AUTO: ABNORMAL /HPF

## 2022-05-31 PROCEDURE — 81001 URINALYSIS AUTO W/SCOPE: CPT | Performed by: INTERNAL MEDICINE

## 2022-05-31 PROCEDURE — 99212 OFFICE O/P EST SF 10 MIN: CPT | Performed by: INTERNAL MEDICINE

## 2022-05-31 PROCEDURE — 87086 URINE CULTURE/COLONY COUNT: CPT | Performed by: INTERNAL MEDICINE

## 2022-05-31 PROCEDURE — 81002 URINALYSIS NONAUTO W/O SCOPE: CPT | Performed by: INTERNAL MEDICINE

## 2022-05-31 RX ORDER — NITROFURANTOIN 25; 75 MG/1; MG/1
100 CAPSULE ORAL 2 TIMES DAILY
Qty: 10 CAPSULE | Refills: 0 | Status: SHIPPED | OUTPATIENT
Start: 2022-05-31 | End: 2022-06-05

## 2022-06-03 ENCOUNTER — TELEPHONE (OUTPATIENT)
Dept: OTHER | Facility: OTHER | Age: 32
End: 2022-06-03

## 2022-06-03 PROBLEM — R31.9 URINARY TRACT INFECTION WITH HEMATURIA: Status: ACTIVE | Noted: 2022-06-03

## 2022-06-03 PROBLEM — N39.0 URINARY TRACT INFECTION WITH HEMATURIA: Status: ACTIVE | Noted: 2022-06-03

## 2022-06-03 LAB — BACTERIA UR CULT: ABNORMAL

## 2022-06-03 NOTE — PROGRESS NOTES
Assessment/Plan:     1  Urinary tract infection with hematuria, site unspecified  Assessment & Plan:  UA + and symptoms + , will Rx UTI  Hydrate well   Complete course of Abx   If fever or chills, please inform us  Orders:  -     UA w Reflex to Microscopic w Reflex to Culture - Clinic Collect  -     POCT urine dip  -     nitrofurantoin (Macrobid) 100 mg capsule; Take 1 capsule (100 mg total) by mouth 2 (two) times a day for 5 days  -     Urine Microscopic  -     Urine culture        Subjective:      Patient ID: Abhishek Monroy is a 33 y o  female  HPI   33year old female come in for 1st episode of UTI  She has frequency of urination and urgency  No fever or chills  No prior episodes  The following portions of the patient's history were reviewed and updated as appropriate: allergies, current medications, past family history, past medical history, past social history, past surgical history, and problem list     Review of Systems   Constitutional: Negative for chills and fever  HENT: Negative for ear pain and sore throat  Respiratory: Negative for cough and shortness of breath  Cardiovascular: Negative for chest pain and palpitations  Gastrointestinal: Negative for vomiting  Genitourinary: Positive for dysuria and urgency  Negative for hematuria  Musculoskeletal: Negative for arthralgias and back pain  Skin: Negative for color change and rash  Neurological: Negative for syncope  Psychiatric/Behavioral: Negative for agitation  The patient is not nervous/anxious  All other systems reviewed and are negative  Objective:      /72   Pulse 92   Resp 16   Ht 5' 5 3" (1 659 m)   Wt 62 6 kg (138 lb)   SpO2 100%   BMI 22 75 kg/m²          Physical Exam  Vitals and nursing note reviewed  Constitutional:       General: She is not in acute distress  Appearance: Normal appearance  She is not ill-appearing, toxic-appearing or diaphoretic        Comments: suprapubic tenderness +   HENT:      Head: Normocephalic and atraumatic  Eyes:      Extraocular Movements: Extraocular movements intact  Cardiovascular:      Rate and Rhythm: Normal rate and regular rhythm  Pulses: Normal pulses  Heart sounds: Normal heart sounds  Pulmonary:      Effort: Pulmonary effort is normal  No respiratory distress  Breath sounds: Normal breath sounds  No wheezing or rales  Abdominal:      General: Abdomen is flat  There is no distension  Tenderness: There is no abdominal tenderness  There is no guarding  Skin:     General: Skin is warm  Neurological:      General: No focal deficit present  Mental Status: She is alert and oriented to person, place, and time     Psychiatric:         Mood and Affect: Mood normal          Behavior: Behavior normal

## 2022-06-03 NOTE — ASSESSMENT & PLAN NOTE
UA + and symptoms + , will Rx UTI  Hydrate well   Complete course of Abx   If fever or chills, please inform us

## 2022-06-08 NOTE — TELEPHONE ENCOUNTER
Spoke to patient and advised results  She states that she stopped the Macrobid Friday night with 3 pills left due to nausea and vomiting  She is currently not symptomatic  Please advise

## 2022-07-28 ENCOUNTER — ANNUAL EXAM (OUTPATIENT)
Dept: OBGYN CLINIC | Facility: CLINIC | Age: 32
End: 2022-07-28
Payer: COMMERCIAL

## 2022-07-28 VITALS
DIASTOLIC BLOOD PRESSURE: 80 MMHG | SYSTOLIC BLOOD PRESSURE: 112 MMHG | WEIGHT: 138.6 LBS | BODY MASS INDEX: 22.28 KG/M2 | HEIGHT: 66 IN

## 2022-07-28 DIAGNOSIS — Z01.419 ENCOUNTER FOR ANNUAL ROUTINE GYNECOLOGICAL EXAMINATION: Primary | ICD-10-CM

## 2022-07-28 DIAGNOSIS — Z01.419 ENCOUNTER FOR GYNECOLOGICAL EXAMINATION WITHOUT ABNORMAL FINDING: ICD-10-CM

## 2022-07-28 PROBLEM — N39.0 URINARY TRACT INFECTION WITH HEMATURIA: Status: RESOLVED | Noted: 2022-06-03 | Resolved: 2022-07-28

## 2022-07-28 PROBLEM — Z00.00 WELLNESS EXAMINATION: Status: RESOLVED | Noted: 2021-10-03 | Resolved: 2022-07-28

## 2022-07-28 PROBLEM — R31.9 URINARY TRACT INFECTION WITH HEMATURIA: Status: RESOLVED | Noted: 2022-06-03 | Resolved: 2022-07-28

## 2022-07-28 PROCEDURE — S0612 ANNUAL GYNECOLOGICAL EXAMINA: HCPCS | Performed by: OBSTETRICS & GYNECOLOGY

## 2022-07-28 NOTE — PROGRESS NOTES
Jose De Jesus Pedro  1990      CC:  Yearly exam    S:  27 y o  female here for yearly exam  Her cycles have become slightly irregular (sometimes a week late) but not heavy or crampy  She does notice an increase in bloating and mood symptoms for the two weeks before her menses  Discussed options for treatment  She would prefer a more natural approach so will try evening primrose oil  She continues to feel the subtle thickening in her right breast but does not feel that it has changed since she noticed it and had it imaged  Sexual activity: She is sexually active without pain, bleeding or dryness  Contraception: She uses withdrawal for contraception  She is still happy to use this though she wants her  to have a vasectomy once she turns 26 to avoid an unintended pregnancy  Last Pap 6/5/2019 - normal/negative    We reviewed [facility] guidelines for Pap testing today  No current outpatient medications on file    Social History     Socioeconomic History    Marital status: /Civil Union     Spouse name: Not on file    Number of children: Not on file    Years of education: Not on file    Highest education level: Not on file   Occupational History    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   Tobacco Use    Smoking status: Never Smoker    Smokeless tobacco: Never Used   Vaping Use    Vaping Use: Never used   Substance and Sexual Activity    Alcohol use: Yes     Comment: social    Drug use: No    Sexual activity: Yes     Partners: Male     Birth control/protection: Coitus interruptus     Comment: withdrawl   Other Topics Concern    Not on file   Social History Narrative    Not on file     Social Determinants of Health     Financial Resource Strain: Not on file   Food Insecurity: Not on file   Transportation Needs: Not on file   Physical Activity: Not on file   Stress: Not on file   Social Connections: Not on file   Intimate Partner Violence: Not on file   Housing Stability: Not on file     Family History   Problem Relation Age of Onset    Uterine cancer Mother 48    Prostate cancer Father 62    No Known Problems Daughter     No Known Problems Daughter     No Known Problems Daughter     No Known Problems Daughter     Diabetes Maternal Grandmother     Stroke Maternal Grandmother     Cancer Maternal Grandfather         lung    Lung cancer Maternal Grandfather     Heart disease Paternal Grandfather     Breast cancer Neg Hx     Colon cancer Neg Hx     Ovarian cancer Neg Hx     Cervical cancer Neg Hx       Past Medical History:   Diagnosis Date    Anorexia     resolved    Anxiety     Asthma     Migraine     As Child/Resolved    Normal delivery     2013 daughter, 2014 daughter, 2016 daughter    PONV (postoperative nausea and vomiting)     Varicella     As child        Review of Systems   Respiratory: Negative  Cardiovascular: Negative  Gastrointestinal: Negative for constipation and diarrhea  Genitourinary: Negative for difficulty urinating, pelvic pain, vaginal bleeding, vaginal discharge, itching or odor  O:  Blood pressure 112/80, height 5' 5 5" (1 664 m), weight 62 9 kg (138 lb 9 6 oz), last menstrual period 07/02/2022, not currently breastfeeding  Patient appears well and is not in distress  Neck is supple without masses  Breasts are symmetrical without mass, tenderness, nipple discharge, skin changes or adenopathy  Abdomen is soft and nontender without masses  External genitals are normal without lesions or rashes  Urethral meatus and urethra are normal  Bladder is normal to palpation  Vagina is normal without discharge or bleeding  Cervix is normal without discharge or lesion  Uterus is normal, mobile, nontender without palpable mass  Adnexa are normal, nontender, without palpable mass  A:   Yearly exam      P:   Pap due 2024    RTO one year for yearly exam or sooner as needed

## 2022-10-03 ENCOUNTER — OFFICE VISIT (OUTPATIENT)
Dept: INTERNAL MEDICINE CLINIC | Facility: CLINIC | Age: 32
End: 2022-10-03
Payer: COMMERCIAL

## 2022-10-03 VITALS
BODY MASS INDEX: 22.85 KG/M2 | SYSTOLIC BLOOD PRESSURE: 118 MMHG | WEIGHT: 142.2 LBS | HEIGHT: 66 IN | DIASTOLIC BLOOD PRESSURE: 72 MMHG | RESPIRATION RATE: 16 BRPM | OXYGEN SATURATION: 99 % | HEART RATE: 58 BPM

## 2022-10-03 DIAGNOSIS — R42 DIZZINESS: ICD-10-CM

## 2022-10-03 DIAGNOSIS — Z00.00 WELLNESS EXAMINATION: ICD-10-CM

## 2022-10-03 DIAGNOSIS — R31.29 MICROSCOPIC HEMATURIA: Primary | ICD-10-CM

## 2022-10-03 DIAGNOSIS — Z13.6 SCREENING FOR CARDIOVASCULAR CONDITION: ICD-10-CM

## 2022-10-03 DIAGNOSIS — Z13.1 SCREENING FOR DIABETES MELLITUS: ICD-10-CM

## 2022-10-03 DIAGNOSIS — E55.9 VITAMIN D DEFICIENCY: ICD-10-CM

## 2022-10-03 DIAGNOSIS — Z11.59 NEED FOR HEPATITIS C SCREENING TEST: Primary | ICD-10-CM

## 2022-10-03 DIAGNOSIS — R22.1 NECK MASS: ICD-10-CM

## 2022-10-03 PROCEDURE — 99395 PREV VISIT EST AGE 18-39: CPT | Performed by: INTERNAL MEDICINE

## 2022-10-03 PROCEDURE — 93000 ELECTROCARDIOGRAM COMPLETE: CPT | Performed by: INTERNAL MEDICINE

## 2022-10-03 NOTE — PROGRESS NOTES
ADULT ANNUAL PHYSICAL  [facility]    NAME: Sebastian Angel  AGE: 27 y o  SEX: female  : 1990     DATE: 10/3/2022     Assessment and Plan:     Problem List Items Addressed This Visit        Other    Vitamin D deficiency     Will check a vitamin-D level         Relevant Orders    Vitamin D 25 hydroxy    Wellness examination     Assessment and plan 1  Health maintenance annual wellness examination overall the patient is clinically stable and doing well, we encouraged the patient to follow a healthy and balanced diet  We recommend that the patient exercise routinely approximately 30 minutes 5 times per week   We have reviewed the patient's vaccines and have made recommendations for updates if necessary  annual flu shot/COVID vaccine      We will be ordering screening laboratories which are age appropriate  Return to the office in   1 year   call if any problems  Neck mass     Prominent right carotid bulb will check a carotid Doppler         Relevant Orders    VAS carotid complete study    Dizziness     Check CBC hydration 64 oz daily         Relevant Orders    CBC (Includes Diff/Plt) (Refl)      Other Visit Diagnoses     Need for hepatitis C screening test    -  Primary    Relevant Orders    Hepatitis C Antibody ([facility])    Screening for diabetes mellitus        Relevant Orders    Comprehensive metabolic panel    Hemoglobin A1C    Screening for cardiovascular condition        Relevant Orders    Lipid Panel with Direct LDL reflex        Because of bradycardia we did check EKG at today's visit  Immunizations and preventive care screenings were discussed with patient today  Appropriate education was printed on patient's after visit summary  Counseling:  Exercise: the importance of regular exercise/physical activity was discussed  Recommend exercise 3-5 times per week for at least 30 minutes         Depression Screening and Follow-up Plan: Patient was screened for depression during today's encounter  They screened negative with a PHQ-2 score of 0  Return in about 1 year (around 10/3/2023)  Chief Complaint:     Chief Complaint   Patient presents with    Annual Exam      History of Present Illness:     Adult Annual Physical   Patient here for a comprehensive physical exam  The patient reports no problems  Diet and Physical Activity  Diet/Nutrition: well balanced diet  Exercise: moderate cardiovascular exercise  General Health  Sleep: gets 4-6 hours of sleep on average  Hearing: normal - bilateral   Vision: no vision problems  Dental: regular dental visits  /GYN Health  Last menstrual period: sep 23  Contraceptive method: withdrawl       Review of Systems:     Review of Systems   Constitutional: Negative for activity change, appetite change and unexpected weight change  HENT: Negative for congestion  Eyes: Negative for visual disturbance  Respiratory: Negative for cough and shortness of breath  Cardiovascular: Negative for chest pain  Gastrointestinal: Negative for abdominal pain, diarrhea, nausea and vomiting  Neurological: Positive for dizziness  Negative for syncope, light-headedness and headaches        Past Medical History:     Past Medical History:   Diagnosis Date    Anorexia     resolved    Anxiety     Asthma     Migraine     As Child/Resolved    Normal delivery     2013 daughter, 2014 daughter, 2016 daughter    PONV (postoperative nausea and vomiting)     Varicella     As child      Past Surgical History:     Past Surgical History:   Procedure Laterality Date    ARM SKIN LESION BIOPSY / EXCISION Right     MYRINGOTOMY W/ TUBES      WISDOM TOOTH EXTRACTION        Social History:     Social History     Socioeconomic History    Marital status: /Civil Union     Spouse name: None    Number of children: None    Years of education: None    Highest education level: None Occupational History    Occupation: Physician Assistant     Comment: [employer]   Tobacco Use    Smoking status: Never Smoker    Smokeless tobacco: Never Used   Vaping Use    Vaping Use: Never used   Substance and Sexual Activity    Alcohol use: Yes     Comment: social    Drug use: No    Sexual activity: Yes     Partners: Male     Birth control/protection: Coitus interruptus     Comment: withdrawl   Other Topics Concern    None   Social History Narrative    None     Social Determinants of Health     Financial Resource Strain: Not on file   Food Insecurity: Not on file   Transportation Needs: Not on file   Physical Activity: Not on file   Stress: Not on file   Social Connections: Not on file   Intimate Partner Violence: Not on file   Housing Stability: Not on file      Family History:     Family History   Problem Relation Age of Onset    Uterine cancer Mother 48    Prostate cancer Father 62    No Known Problems Daughter     No Known Problems Daughter     No Known Problems Daughter     No Known Problems Daughter     Diabetes Maternal Grandmother     Stroke Maternal Grandmother     Cancer Maternal Grandfather         lung    Lung cancer Maternal Grandfather     Heart disease Paternal Grandfather     Breast cancer Neg Hx     Colon cancer Neg Hx     Ovarian cancer Neg Hx     Cervical cancer Neg Hx       Current Medications:     No current outpatient medications on file  No current facility-administered medications for this visit  Allergies:     No Known Allergies   Physical Exam:     /72   Pulse 58   Resp 16   Ht 5' 5 5" (1 664 m)   Wt 64 5 kg (142 lb 3 2 oz)   SpO2 99%   BMI 23 30 kg/m²     Physical Exam  Vitals and nursing note reviewed  Constitutional:       General: She is not in acute distress  Appearance: Normal appearance  She is well-developed and normal weight  She is not ill-appearing, toxic-appearing or diaphoretic     HENT:      Head: Normocephalic and atraumatic  Right Ear: External ear normal       Left Ear: External ear normal       Nose: Nose normal    Eyes:      Pupils: Pupils are equal, round, and reactive to light  Cardiovascular:      Rate and Rhythm: Normal rate and regular rhythm  Heart sounds: Normal heart sounds  No murmur heard  Pulmonary:      Effort: Pulmonary effort is normal       Breath sounds: Normal breath sounds  Abdominal:      General: There is no distension  Palpations: Abdomen is soft  Tenderness: There is no abdominal tenderness  There is no guarding  Neurological:      Mental Status: She is alert        prominent right carotid bulb  Shen Pace DO   [facility] [address]

## 2022-10-03 NOTE — ASSESSMENT & PLAN NOTE
Assessment and plan 1  Health maintenance annual wellness examination overall the patient is clinically stable and doing well, we encouraged the patient to follow a healthy and balanced diet  We recommend that the patient exercise routinely approximately 30 minutes 5 times per week   We have reviewed the patient's vaccines and have made recommendations for updates if necessary  annual flu shot/COVID vaccine      We will be ordering screening laboratories which are age appropriate  Return to the office in   1 year   call if any problems

## 2022-10-26 ENCOUNTER — HOSPITAL ENCOUNTER (OUTPATIENT)
Dept: VASCULAR ULTRASOUND | Facility: HOSPITAL | Age: 32
Discharge: HOME | End: 2022-10-26
Payer: COMMERCIAL

## 2022-10-26 DIAGNOSIS — R22.1 NECK MASS: ICD-10-CM

## 2022-10-26 PROCEDURE — 93880 EXTRACRANIAL BILAT STUDY: CPT

## 2022-10-27 ENCOUNTER — TELEPHONE (OUTPATIENT)
Dept: OTHER | Facility: OTHER | Age: 32
End: 2022-10-27

## 2022-10-27 DIAGNOSIS — R59.0 CERVICAL ADENOPATHY: Primary | ICD-10-CM

## 2022-10-31 ENCOUNTER — HOSPITAL ENCOUNTER (OUTPATIENT)
Dept: RADIOLOGY | Age: 32
Discharge: HOME | End: 2022-10-31

## 2022-10-31 DIAGNOSIS — R59.0 CERVICAL ADENOPATHY: ICD-10-CM

## 2022-11-01 NOTE — TELEPHONE ENCOUNTER
I called the patient  she spoke with Dr Cindy Harvey regarding the ultrasound and she will schedule an appointment with Dr Roni Licona

## 2022-12-05 ENCOUNTER — TELEMEDICINE (OUTPATIENT)
Dept: INTERNAL MEDICINE CLINIC | Facility: CLINIC | Age: 32
End: 2022-12-05

## 2022-12-05 DIAGNOSIS — U07.1 COVID-19: Primary | ICD-10-CM

## 2022-12-05 LAB
SARS-COV-2 AG UPPER RESP QL IA: POSITIVE
VALID CONTROL: ABNORMAL

## 2022-12-05 NOTE — ASSESSMENT & PLAN NOTE
COVID-19 mild/currently pregnant week 7    Symptoms are very mild she has had the symptoms since 11/23/2022 I did discuss the case with her future OBGYN Dr Naomy Snyder; currently paxlovid is not recommended in pregnancy, the option of monoclonal are optional for this patient I did explain this the patient at this point time the patient would like to hold off on starting the monoclonal antibodies for now conservative approach plenty of fluids, rest, I did recommend the patient remain out of work for next week if any change, worsening please notify us any shortness of breath or high persistent temperature please go immediately to the ER patient will use an over-the-counter cough medication safe in pregnancy RTO as scheduled call if any change, worse or symptoms not fer

## 2022-12-05 NOTE — LETTER
December 5, 2022     Patient: Moisés Pizano  YOB: 1990  Date of Visit: 12/5/2022      To Whom it May Concern:    Alexa Jose Jkatie is under my professional care  ΣΑΡΑΝΤΙ was seen in my office on 12/5/2022  ΣΑΡΑΝΤΙ may return to school on 12/12/22  If you have any questions or concerns, please don't hesitate to call           Sincerely,          Chris Colindres DO        CC: No Recipients

## 2022-12-05 NOTE — PROGRESS NOTES
COVID-19 Outpatient Progress Note    Assessment/Plan:    Problem List Items Addressed This Visit        Other    COVID-19 - Primary     COVID-19 mild/currently pregnant week 7  Symptoms are very mild she has had the symptoms since 11/23/2022 I did discuss the case with her future OBGYN Dr Prerna Segovia; currently paxlovid is not recommended in pregnancy, the option of monoclonal are optional for this patient I did explain this the patient at this point time the patient would like to hold off on starting the monoclonal antibodies for now conservative approach plenty of fluids, rest, I did recommend the patient remain out of work for next week if any change, worsening please notify us any shortness of breath or high persistent temperature please go immediately to the ER patient will use an over-the-counter cough medication safe in pregnancy RTO as scheduled call if any change, worse or symptoms not fer         Relevant Orders    POCT Rapid Covid Ag (Completed)    spoke with Dr Hester Res  paxlovid not recommended but could recc monoclonal antiboties (high risk because she is pregnant)  ? Date of the sx onset  Left a message on the pt voice mail to call- addendum spoke with the pt she would like to hold off on monoclonal ab treatment at this point time we will observe if there is any change or worsening of her symptoms she will notify us  For now she will remain out of work the next week she may return next Monday if symptoms have improved no fever  If she develops any shortness of breath or persistently high temperature go immediately to the ER  Disposition:     Recommended patient to come to the office to test for COVID-19  I have spent 20 minutes directly with the patient   Greater than 50% of this time was spent in counseling/coordination of care regarding: diagnostic results, risks and benefits of treatment options, instructions for management, importance of treatment compliance, risk factor reductions and impressions  Encounter provider: Mona Siddiqui DO     Provider located at: [address] [facility]  [address] [facility] [address]     Recent Visits  No visits were found meeting these conditions  Showing recent visits within past 7 days and meeting all other requirements  Today's Visits  Date Type Provider Dept   12/05/22 [id number] [address] today's visits and meeting all other requirements  Future Appointments  No visits were found meeting these conditions  Showing future appointments within next 150 days and meeting all other requirements       Patient agrees to participate in a virtual check in via telephone or video visit instead of presenting to the office to address urgent/immediate medical needs  Patient is aware this is a billable service  She acknowledged consent and understanding of privacy and security of the video platform  The patient has agreed to participate and understands they can discontinue the visit at any time  After connecting through [facility], the patient was identified by name and date of birth  Edilberto Telles was informed that this was a telemedicine visit and that the exam was being conducted confidentially over secure lines  My office door was closed  No one else was in the room  Edilberto Telles acknowledged consent and understanding of privacy and security of the telemedicine visit  I informed the patient that I have reviewed her record in Epic and presented the opportunity for her to ask any questions regarding the visit today  The patient agreed to participate  Verification of patient location:  Patient is located in the following state in which I hold an active license: [place]    Subjective:   Edilberto Telles is a 27 y o  female who is concerned about COVID-19   Patient's symptoms include chills, fatigue, nasal congestion, rhinorrhea, cough (barkey cough yellow phlem), chest tightness and headache  Patient denies fever, malaise, sore throat, anosmia, loss of taste, shortness of breath, abdominal pain, nausea, vomiting, diarrhea and myalgias  - Date of symptom onset: 11/23/2022      COVID-19 vaccination status: Fully vaccinated with booster    Exposure:   Contact with a person who is under investigation (PUI) for or who is positive for COVID-19 within the last 14 days?: No    Hospitalized recently for fever and/or lower respiratory symptoms?: No      Currently a healthcare worker that is involved in direct patient care?: Yes      Works in a special setting where the risk of COVID-19 transmission may be high? (this may include long-term care, correctional and correction facilities; homeless shelters; assisted-living facilities and group homes ): No      Resident in a special setting where the risk of COVID-19 transmission may be high? (this may include long-term care, correctional and correction facilities; homeless shelters; assisted-living facilities and group homes ): No      Flu like sx  Wed(12/1) thurs chest getting tighter ,   Congestion 11/23   Pregnant 7 weeks pregnant    Lab Results   Component Value Date    SARSCOVAG Positive (A) 12/05/2022       Review of Systems   Constitutional: Positive for chills and fatigue  Negative for fever  HENT: Positive for congestion and rhinorrhea  Negative for sore throat  Respiratory: Positive for cough (barkey cough yellow phlem) and chest tightness  Negative for shortness of breath  Gastrointestinal: Negative for abdominal pain, diarrhea, nausea and vomiting  Musculoskeletal: Negative for myalgias  Neurological: Positive for headaches  Current Outpatient Medications on File Prior to Visit   Medication Sig   • Multiple Vitamins-Minerals (MULTIVITAMIN ADULT, MINERALS, PO)        Objective: There were no vitals taken for this visit       Physical Exam  Miguel Darling DO

## 2022-12-07 ENCOUNTER — CLINICAL SUPPORT (OUTPATIENT)
Dept: INTERNAL MEDICINE CLINIC | Facility: CLINIC | Age: 32
End: 2022-12-07

## 2022-12-07 DIAGNOSIS — U07.1 COVID-19: Primary | ICD-10-CM

## 2022-12-07 DIAGNOSIS — R06.2 WHEEZING: Primary | ICD-10-CM

## 2022-12-07 RX ORDER — ALBUTEROL SULFATE 90 UG/1
2 AEROSOL, METERED RESPIRATORY (INHALATION) EVERY 6 HOURS PRN
Qty: 1 G | Refills: 0 | Status: SHIPPED | OUTPATIENT
Start: 2022-12-07 | End: 2022-12-22 | Stop reason: ALTCHOICE

## 2022-12-08 LAB
FLUAV RNA RESP QL NAA+PROBE: POSITIVE
FLUBV RNA RESP QL NAA+PROBE: NEGATIVE
SARS-COV-2 RNA RESP QL NAA+PROBE: NEGATIVE

## 2022-12-09 PROBLEM — U07.1 COVID-19: Status: RESOLVED | Noted: 2022-12-05 | Resolved: 2022-12-09

## 2022-12-09 PROBLEM — J10.1 INFLUENZA A: Status: ACTIVE | Noted: 2022-12-09

## 2022-12-11 PROBLEM — J01.10 ACUTE NON-RECURRENT FRONTAL SINUSITIS: Status: ACTIVE | Noted: 2022-12-11

## 2022-12-22 ENCOUNTER — ULTRASOUND (OUTPATIENT)
Dept: OBGYN CLINIC | Facility: CLINIC | Age: 32
End: 2022-12-22

## 2022-12-22 VITALS — BODY MASS INDEX: 22.75 KG/M2 | WEIGHT: 138.8 LBS | SYSTOLIC BLOOD PRESSURE: 100 MMHG | DIASTOLIC BLOOD PRESSURE: 62 MMHG

## 2022-12-22 DIAGNOSIS — N91.1 AMENORRHEA, SECONDARY: Primary | ICD-10-CM

## 2022-12-22 PROBLEM — I10 ESSENTIAL HYPERTENSION: Status: RESOLVED | Noted: 2019-05-20 | Resolved: 2022-12-22

## 2022-12-22 NOTE — PROGRESS NOTES
EARLY PREGNANCY ULTRASOUND    Ultrasound Probe Disinfection    A transvaginal ultrasound was performed  Prior to use, disinfection was performed with High Level Disinfection Process ([employer])  Probe serial number SLOGA-B: [id number] was used    Shannan Durbin MD  22  8:34 AM      SUBJECTIVE    HPI: Tobias Nelson is a 27 y o   female here today for early pregnancy ultrasound  Patient's last menstrual period was 10/22/2022 (exact date)    Menses are regular  This pregnancy was unplanned but welcome  She is accompanied by partner  OBHx is significant for  x 4  Taking a prenatal vitamin  No Known Allergies    Current Outpatient Medications:   •  Multiple Vitamins-Minerals (MULTIVITAMIN ADULT, MINERALS, PO), , Disp: , Rfl:       OBJECTIVE  Vitals:    22 0831   BP: 100/62   BP Location: Right arm   Patient Position: Sitting   Cuff Size: Standard   Weight: 63 kg (138 lb 12 8 oz)         Early OB Ultrasound Procedure Note: Transvaginal US    Technician: Study performed by the interpreting physician    Indications:  Early gestation, dating & viability    Procedure Details   The entire study was done at settings of 6 0 to 8 0 MHz  Gestational Sac: Present  Crown-rump length is 2 2cm and calculates to an estimated gestational age of 11 weeks, 6 days  Embryonic cardiac activity is seen at a rate of 175 b/min  Description of fetal anatomy Normal    Cul-de-sac: no fluid    Findings:  Viable, bay intrauterine pregnancy      ASSESSMENT  Early pregnancy at 8 weeks 5 days with a calculated NIKOS of 23 based on LMP      PLAN    1 - RTO for OB interview and PN-1 visit          All questions were answered & patient expressed understanding      Shannan Durbin MD

## 2023-01-05 ENCOUNTER — INITIAL PRENATAL (OUTPATIENT)
Dept: OBGYN CLINIC | Facility: CLINIC | Age: 33
End: 2023-01-05

## 2023-01-05 DIAGNOSIS — Z34.81 PRENATAL CARE, SUBSEQUENT PREGNANCY, FIRST TRIMESTER: Primary | ICD-10-CM

## 2023-01-05 NOTE — PROGRESS NOTES
OB INTAKE INTERVIEW  Patient is 27 y o    who presents for OB intake at 10-5 wks  She is accompanied by: phone interview  The father of her baby Chichi Gaines) is involved in the pregnancy and they are     Last Menstrual Period: 10/22/22  Ultrasound: Measured 8 weeks 5 days on 22  Estimated Date of Delivery: 23 via LMP    Signs/Symptoms of Pregnancy  Current pregnancy symptoms: nausea  Constipation no  Headaches no  Cramping/spotting no  PICA cravings no    Diabetes-    If patient has 1 or more, please order early 1 hour GTT  History of GDM no  BMI >35 no  History of PCOS or current metformin use no  History of LGA/macrosomic infant (4000g/9lbs) no    If patient has 2 or more, please order early 1 hour GTT  BMI>30 no  AMA YES  First degree relative with type 2 diabetes no  History of chronic HTN, hyperlipidemia, elevated A1C no  High risk race (, , ,  or ) no    Hypertension- if you answer yes, please order preeclampsia labs (cbc, comprehensive metabolic panel, urine protein creatinine ratio, uric acid)  History of of chronic HTN no  History of gestational HTN no  History of preeclampsia, eclampsia, or HELLP syndrome no  History of diabetes no  History of lupus, autoimmune disease, kidney disease no    Thyroid- if yes order TSH with reflex T4  History of thyroid disease no    Bleeding Disorder or Hx of DVT-patient or first degree relative with history of  Order the following if not done previously     (Factor V, antithrombin III, prothrombin gene mutation, protein C and S Ag, lupus anticoagulant, anticardiolipin, beta-2 glycoprotein)   no    OB/GYN-  History of abnormal pap smear no  History of HPV no  History of Herpes/HSV no  History of other STI (gonorrhea, chlamydia, trich) no  History of prior  YES  History of prior  no  History of  delivery prior to 36 weeks 6 days no  History of blood transfusion no  Ok for blood transfusion yes    Substance screening- if yes outside of tobacco for her or anyone in her home-order urine drug screen  History of tobacco use no  Currently using tobacco no  Currently using alcohol no  Presently using drugs no  Past drug use  no  IV drug use-If yes add Hep C antibody to labs no  Partner drug use no  Parent/Family drug use no    MRSA Screening-   Does the pt have a hx of MRSA? no  If yes- please follow MRSA protocol and obtain a nasal swab for MRSA culture    Immunizations:  Influenza vaccine given this season yes  Discussed Tdap vaccine yes  Discussed COVID Vaccine yes    Genetic/MFM-  Do you or your partner have a history of any of the following in yourselves or first degree relatives? Cystic fibrosis no  Spinal muscular atrophy no  Hemoglobinopathy/Sickle Cell/Thalassemia no  Fragile X Intellectual Disability no    If yes, discuss carrier screening and recommend consultation with [facility]/genetic counseling  If no, discuss option for carrier screening and/or genetic testing with Nuchal Ultrasound  Patient interested yes  Appointment at [facility] made yes    Interview education  [facility] Pregnancy Essentials Book reviewed and discussed yes    Nurse/Family Partnership- patient may qualify no; referral placed no    Prenatal lab work scripts yes  Extra labs ordered:  no    The patient has a history now or in prior pregnancy notable for:    HX of 4 prior vag millie,  DAVIDA,  BMI 22 7,  A Positive,  Hx of Asthma,  Pt works at [employer]  Details that I feel the provider should be aware of: Pt received Flu Vaccine & Covid Vaccines x 2 & 1 booster  PN1 visit scheduled  The patient was oriented to our practice, reviewed delivering physicians and [facility] for Delivery  All questions were answered  PN phone interview completed  Pt is a [employer] employee  PN bldwk ordered in epic  Encouraged pt to have bldwk drawn prior to PN 1 visit, scheduled for 23    Referral was entered for [facility]- Pt has appts scheduled for 1/23/23 & 3/14/23        Interviewed by: Comfort Lima RN, [address] [place] [address]

## 2023-01-14 ENCOUNTER — APPOINTMENT (OUTPATIENT)
Dept: LAB | Facility: CLINIC | Age: 33
End: 2023-01-14

## 2023-01-14 DIAGNOSIS — Z34.81 PRENATAL CARE, SUBSEQUENT PREGNANCY, FIRST TRIMESTER: ICD-10-CM

## 2023-01-14 LAB
ABO GROUP BLD: NORMAL
BACTERIA UR QL AUTO: ABNORMAL /HPF
BASOPHILS # BLD AUTO: 0.03 THOUSANDS/ÂΜL (ref 0–0.1)
BASOPHILS NFR BLD AUTO: 1 % (ref 0–1)
BILIRUB UR QL STRIP: NEGATIVE
BLD GP AB SCN SERPL QL: NEGATIVE
CLARITY UR: CLEAR
COLOR UR: YELLOW
EOSINOPHIL # BLD AUTO: 0.14 THOUSAND/ÂΜL (ref 0–0.61)
EOSINOPHIL NFR BLD AUTO: 2 % (ref 0–6)
ERYTHROCYTE [DISTWIDTH] IN BLOOD BY AUTOMATED COUNT: 13.3 % (ref 11.6–15.1)
GLUCOSE UR STRIP-MCNC: NEGATIVE MG/DL
HBV SURFACE AG SER QL: NORMAL
HCT VFR BLD AUTO: 34.9 % (ref 34.8–46.1)
HCV AB SER QL: NORMAL
HGB BLD-MCNC: 11.6 G/DL (ref 11.5–15.4)
HGB UR QL STRIP.AUTO: NEGATIVE
IMM GRANULOCYTES # BLD AUTO: 0.01 THOUSAND/UL (ref 0–0.2)
IMM GRANULOCYTES NFR BLD AUTO: 0 % (ref 0–2)
KETONES UR STRIP-MCNC: NEGATIVE MG/DL
LEUKOCYTE ESTERASE UR QL STRIP: ABNORMAL
LYMPHOCYTES # BLD AUTO: 1.89 THOUSANDS/ÂΜL (ref 0.6–4.47)
LYMPHOCYTES NFR BLD AUTO: 32 % (ref 14–44)
MCH RBC QN AUTO: 29.1 PG (ref 26.8–34.3)
MCHC RBC AUTO-ENTMCNC: 33.2 G/DL (ref 31.4–37.4)
MCV RBC AUTO: 88 FL (ref 82–98)
MONOCYTES # BLD AUTO: 0.36 THOUSAND/ÂΜL (ref 0.17–1.22)
MONOCYTES NFR BLD AUTO: 6 % (ref 4–12)
MUCOUS THREADS UR QL AUTO: ABNORMAL
NEUTROPHILS # BLD AUTO: 3.57 THOUSANDS/ÂΜL (ref 1.85–7.62)
NEUTS SEG NFR BLD AUTO: 59 % (ref 43–75)
NITRITE UR QL STRIP: NEGATIVE
NON-SQ EPI CELLS URNS QL MICRO: ABNORMAL /HPF
NRBC BLD AUTO-RTO: 0 /100 WBCS
PH UR STRIP.AUTO: 6 [PH]
PLATELET # BLD AUTO: 258 THOUSANDS/UL (ref 149–390)
PMV BLD AUTO: 9.8 FL (ref 8.9–12.7)
PROT UR STRIP-MCNC: ABNORMAL MG/DL
RBC # BLD AUTO: 3.98 MILLION/UL (ref 3.81–5.12)
RBC #/AREA URNS AUTO: ABNORMAL /HPF
RH BLD: POSITIVE
RUBV IGG SERPL IA-ACNC: >175 IU/ML
SP GR UR STRIP.AUTO: 1.02 (ref 1–1.03)
UROBILINOGEN UR STRIP-ACNC: <2 MG/DL
WBC # BLD AUTO: 6 THOUSAND/UL (ref 4.31–10.16)
WBC #/AREA URNS AUTO: ABNORMAL /HPF

## 2023-01-15 LAB
BACTERIA UR CULT: NORMAL
RPR SER QL: NORMAL

## 2023-01-19 ENCOUNTER — INITIAL PRENATAL (OUTPATIENT)
Dept: OBGYN CLINIC | Facility: CLINIC | Age: 33
End: 2023-01-19

## 2023-01-19 VITALS — BODY MASS INDEX: 23.34 KG/M2 | WEIGHT: 142.4 LBS | SYSTOLIC BLOOD PRESSURE: 116 MMHG | DIASTOLIC BLOOD PRESSURE: 66 MMHG

## 2023-01-19 DIAGNOSIS — Z01.419 ENCOUNTER FOR GYNECOLOGICAL EXAMINATION WITHOUT ABNORMAL FINDING: ICD-10-CM

## 2023-01-19 DIAGNOSIS — Z34.81 PRENATAL CARE, SUBSEQUENT PREGNANCY, FIRST TRIMESTER: Primary | ICD-10-CM

## 2023-01-19 DIAGNOSIS — Z11.51 SCREENING FOR HPV (HUMAN PAPILLOMAVIRUS): ICD-10-CM

## 2023-01-19 DIAGNOSIS — Z11.3 SCREENING FOR STDS (SEXUALLY TRANSMITTED DISEASES): ICD-10-CM

## 2023-01-19 PROBLEM — R58 ECCHYMOSIS: Status: RESOLVED | Noted: 2019-08-13 | Resolved: 2023-01-19

## 2023-01-19 PROBLEM — J10.1 INFLUENZA A: Status: RESOLVED | Noted: 2022-12-09 | Resolved: 2023-01-19

## 2023-01-19 PROBLEM — J01.10 ACUTE NON-RECURRENT FRONTAL SINUSITIS: Status: RESOLVED | Noted: 2022-12-11 | Resolved: 2023-01-19

## 2023-01-19 PROBLEM — R42 DIZZINESS: Status: RESOLVED | Noted: 2022-10-03 | Resolved: 2023-01-19

## 2023-01-19 PROBLEM — Z00.00 WELLNESS EXAMINATION: Status: RESOLVED | Noted: 2021-10-03 | Resolved: 2023-01-19

## 2023-01-19 PROBLEM — H69.92 DYSFUNCTION OF LEFT EUSTACHIAN TUBE: Status: RESOLVED | Noted: 2020-01-02 | Resolved: 2023-01-19

## 2023-01-19 PROBLEM — H69.82 DYSFUNCTION OF LEFT EUSTACHIAN TUBE: Status: RESOLVED | Noted: 2020-01-02 | Resolved: 2023-01-19

## 2023-01-19 LAB
SL AMB  POCT GLUCOSE, UA: NORMAL
SL AMB POCT URINE PROTEIN: NORMAL

## 2023-01-19 NOTE — PROGRESS NOTES
27 y o  [id number] at 12w5d with Estimated Date of Delivery: 23 by LMP, which is consistent w/ 1st trimester US  She denies nausea/vomiting and bleeding/cramping  Prenatal labs: normal, A positive, ab neg    Genetic screening: Scheduled with [facility] for sequential screen 23    OB history:   OB History    Para Term  AB Living   5 4 4 0 0 4   SAB IAB Ectopic Multiple Live Births   0 0 0 0 4      # Outcome Date GA Lbr Dawit/2nd Weight Sex Delivery Anes PTL Lv   5 Current            4 Term 18 40w2d / 00:05 3941 g (8 lb 11 oz) F Vag-Spont None N FLORIAN      Complications: Unstable lie of fetus   3 Term 16 40w6d / 00:14 3890 g (8 lb 9 2 oz) F Vag-Spont None N FLORIAN      Birth Comments: Healthy Baby girl   2 Term 14 38w5d  3600 g (7 lb 15 oz) F Vag-Spont None N FLORIAN      Birth Comments: Healthy Baby   1 Term 13 39w0d  3685 g (8 lb 2 oz) F Vag-Spont None N FLORIAN      Birth Comments: Healthy Baby     GYN history: Last pap smear 2019 NILM/hpv neg  Denies history of STDs  Past medical history:  Asthma - mostly childhood, now only with exacerbations when sick  Has prn albuterol inhaler  Past surgical history:   Ferris teeth extracted  Skin biopsy  Myringotomy tubes    Social history: Denies tobacco, alcohol, or illicit drug use  Family history: Mother Hypertension    Uterine cancer (54 y)    Hypothyroidism    Ankylosing spondylitis   Father Cancer    Prostate cancer (59 y)   Sister No Known Problems   Sister No Known Problems   Brother No Known Problems   Daughter No Known Problems   Daughter No Known Problems   Daughter No Known Problems   Daughter No Known Problems     Physical exam:     Fetal heart tones: 160    Patient appears well and is not in distress  Neck is supple without masses  Breasts are symmetrical without mass, tenderness, nipple discharge, skin changes or adenopathy  Abdomen is soft and nontender without masses     External genitals are normal without lesions or rashes  Vagina is normal without discharge or bleeding  Cervix is normal without discharge or lesion  Closed  Uterus is normal for gestational age  Adnexa are normal, nontender, without palpable mass  Discussed as well during this visit was diet, prenatal vitamins, prenatal visits, lab testing, breast feeding, vaccinations, maternal fetal medicine consultations, and lifestyle  ASSESSMENT/PLAN   Problem List Items Addressed This Visit    None  Visit Diagnoses     Screening for STDs (sexually transmitted diseases)    -  Primary    Relevant Orders    Chlamydia/GC amplified DNA by PCR    Prenatal care, subsequent pregnancy, first trimester        Relevant Orders    POCT urine dip    Encounter for gynecological examination without abnormal finding        Screening for HPV (human papillomavirus)              1 - Gonorrhea and Chlamydia cultures obtained today  2 - Pap smear with HPV co-testing noted to be UTD - due 2024  3 - Prenatal labs reviewed -- unremarkable  4 - Genetic screening scheduled for 1/23/23  Level II 3/14/23  5 - RTO in 4 weeks for routine PN visit    Blue packet given and reviewed     All questions were answered & Talat Bray expressed understanding

## 2023-01-19 NOTE — PROGRESS NOTES
Patient is here for Initial PN 1 visit  She denies any complaints  12wks/5days  NIKOS: 7/29/23  Last pap: 6/5/19 Pap: neg/HPV: neg  GC/CH collected:yes  PN 1 labs completed  Flu vaccine: yes   Covid vaccine: yes  Urine: protein: neg/glucose: neg  Blue information packet given: yes  Nuchal: 1/23/23  Level II US scheduled: 3/14/23

## 2023-01-20 LAB
C TRACH DNA SPEC QL NAA+PROBE: NEGATIVE
N GONORRHOEA DNA SPEC QL NAA+PROBE: NEGATIVE

## 2023-01-23 ENCOUNTER — ROUTINE PRENATAL (OUTPATIENT)
Facility: HOSPITAL | Age: 33
End: 2023-01-23
Attending: OBSTETRICS & GYNECOLOGY

## 2023-01-23 VITALS
DIASTOLIC BLOOD PRESSURE: 58 MMHG | WEIGHT: 142.2 LBS | SYSTOLIC BLOOD PRESSURE: 104 MMHG | HEIGHT: 66 IN | BODY MASS INDEX: 22.85 KG/M2 | HEART RATE: 75 BPM

## 2023-01-23 DIAGNOSIS — Z36.82 ENCOUNTER FOR NUCHAL TRANSLUCENCY TESTING: ICD-10-CM

## 2023-01-23 DIAGNOSIS — O09.521 MULTIGRAVIDA OF ADVANCED MATERNAL AGE IN FIRST TRIMESTER: Primary | ICD-10-CM

## 2023-01-23 DIAGNOSIS — Z3A.13 13 WEEKS GESTATION OF PREGNANCY: ICD-10-CM

## 2023-01-23 NOTE — LETTER
January 23, 2023     [address] [place] [address]    Patient: Ruddy Inman   YOB: 1990   Date of Visit: 1/23/2023       Dear Dr Kiki Somers: Thank you for referring Demi Flaherty to me for evaluation  Below are my notes for this consultation  If you have questions, please do not hesitate to call me  I look forward to following your patient along with you  Sincerely,        Hiren Singletary MD        CC: No Recipients  Hiren Singletary MD  1/23/2023 11:10 AM  Sign when Signing Visit  Lorrie Kasper presents today for genetic screening  This is her fifth pregnancy  She has a history of 4 previous full-term vaginal livery's without complications  She has no significant medical or surgical history otherwise  She currently takes prenatal vitamins and has no known drug allergies  Substance use history and family medical history are unremarkable  A review of systems is otherwise negative  We discussed the options for genetic screening, including but not limited to first trimester screening, second trimester screening, combined first and second trimester screening, noninvasive prenatal screening (NIPS) for patients at high risk and diagnostic screening through the use of CVS and amniocentesis  We discussed the risks and benefits of each approach including the sensitivities and false positive rates as well as the difference between a screening test and a diagnostic test  At the conclusion of our discussion the patient elected noninvasive prenatal testing utilizing the [employer] Non-invasive prenatal screening (NIPS) test  The patient had this blood work drawn in the office and the results should be available approximately 7-10 days after her blood draw  Her results will be reported from [facility]  We discussed follow-up in detail and I recommend an anatomy ultrasound be scheduled for 20 weeks gestation      Thank you very much for allowing us to participate in the care of this very nice patient  Should you have any questions, please do not hesitate to contact me  Portions of the record may have been created with voice recognition software  Occasional wrong word or "sound a like" substitutions may have occurred due to the inherent limitations of voice recognition software  Read the chart carefully and recognize, using context, where substitutions have occurred

## 2023-01-23 NOTE — PROGRESS NOTES
Caridad Carmona presents today for genetic screening  This is her fifth pregnancy  She has a history of 4 previous full-term vaginal livery's without complications  She has no significant medical or surgical history otherwise  She currently takes prenatal vitamins and has no known drug allergies  Substance use history and family medical history are unremarkable  A review of systems is otherwise negative  We discussed the options for genetic screening, including but not limited to first trimester screening, second trimester screening, combined first and second trimester screening, noninvasive prenatal screening (NIPS) for patients at high risk and diagnostic screening through the use of CVS and amniocentesis  We discussed the risks and benefits of each approach including the sensitivities and false positive rates as well as the difference between a screening test and a diagnostic test  At the conclusion of our discussion the patient elected noninvasive prenatal testing utilizing the [employer] Non-invasive prenatal screening (NIPS) test  The patient had this blood work drawn in the office and the results should be available approximately 7-10 days after her blood draw  Her results will be reported from [facility]  We discussed follow-up in detail and I recommend an anatomy ultrasound be scheduled for 20 weeks gestation  Thank you very much for allowing us to participate in the care of this very nice patient  Should you have any questions, please do not hesitate to contact me  Portions of the record may have been created with voice recognition software  Occasional wrong word or "sound a like" substitutions may have occurred due to the inherent limitations of voice recognition software  Read the chart carefully and recognize, using context, where substitutions have occurred

## 2023-01-29 ENCOUNTER — NURSE TRIAGE (OUTPATIENT)
Dept: OTHER | Facility: OTHER | Age: 33
End: 2023-01-29

## 2023-01-30 NOTE — TELEPHONE ENCOUNTER
Reason for Disposition  • SPOTTING (single or brief episode)    Answer Assessment - Initial Assessment Questions  1  ONSET: "When did this bleeding start?"        Started today    2  DESCRIPTION: "Describe the bleeding that you are having " "How much bleeding is there?"     - SPOTTING: spotting, or pinkish / brownish mucous discharge; does not fill panti-liner or pad     - MILD:  less than 1 pad / hour; less than patient's usual menstrual bleeding    - MODERATE: 1-2 pads / hour; 1 menstrual cup every 6 hours; small-medium blood clots (e g , pea, grape, small coin)    - SEVERE: soaking 2 or more pads/hour for 2 or more hours; 1 menstrual cup every 2 hours; bleeding not contained by pads or continuous red blood from vagina; large blood clots (e g , golf ball, large coin)       Brownish- initially a darker brown and now a light brown  Pt  States it filled 1 panty liner, not soaking through  States the spotting appeared like last day of menstrual cycle bleeding  3  ABDOMINAL PAIN SEVERITY: If present, ask: "How bad is it?"  (e g , Scale 1-10; mild, moderate, or severe)    - MILD (1-3): doesn't interfere with normal activities, abdomen soft and not tender to touch     - MODERATE (4-7): interferes with normal activities or awakens from sleep, tender to touch     - SEVERE (8-10): excruciating pain, doubled over, unable to do any normal activities      Denies cramping or abdominal pain    4  PREGNANCY: "Do you know how many weeks or months pregnant you are?" "When was the first day of your last normal menstrual period?"      14 weeks pregnant      NIKOS 7/29/23    5  HEMODYNAMIC STATUS: "Are you weak or feeling lightheaded?" If Yes, ask: "Can you stand and walk normally?"       Denies other symptoms    6  OTHER SYMPTOMS: "What other symptoms are you having with the bleeding?" (e g , passed tissue, vaginal discharge, fever, menstrual-type cramps)      Denies other symptoms    7   OTHER:  Denies sexual activity in the last 24 hours    Protocols used: PREGNANCY - VAGINAL BLEEDING LESS THAN 20 WEEKS EGA-ADULT-AH

## 2023-01-30 NOTE — TELEPHONE ENCOUNTER
Regardin weeks pregnant/brownish discharge  ----- Message from Veronica Ocampo sent at 2023  8:45 PM EST -----  Pt called " I am 14 weeks pregnant and have some brownish discharge   I'm not sure if it's blood or not "

## 2023-02-15 ENCOUNTER — ROUTINE PRENATAL (OUTPATIENT)
Dept: OBGYN CLINIC | Facility: CLINIC | Age: 33
End: 2023-02-15

## 2023-02-15 VITALS — SYSTOLIC BLOOD PRESSURE: 110 MMHG | BODY MASS INDEX: 23.93 KG/M2 | WEIGHT: 146 LBS | DIASTOLIC BLOOD PRESSURE: 64 MMHG

## 2023-02-15 DIAGNOSIS — Z34.82 PRENATAL CARE, SUBSEQUENT PREGNANCY IN SECOND TRIMESTER: Primary | ICD-10-CM

## 2023-02-15 DIAGNOSIS — Z30.09 ENCOUNTER FOR OTHER GENERAL COUNSELING OR ADVICE ON CONTRACEPTION: ICD-10-CM

## 2023-02-15 DIAGNOSIS — Z3A.16 16 WEEKS GESTATION OF PREGNANCY: ICD-10-CM

## 2023-02-15 DIAGNOSIS — O09.522 ADVANCED MATERNAL AGE IN MULTIGRAVIDA, SECOND TRIMESTER: ICD-10-CM

## 2023-02-15 PROBLEM — Z30.9 CONTRACEPTION MANAGEMENT: Status: ACTIVE | Noted: 2023-02-15

## 2023-02-15 LAB
SL AMB  POCT GLUCOSE, UA: NEGATIVE
SL AMB POCT URINE PROTEIN: NEGATIVE

## 2023-02-15 NOTE — PROGRESS NOTES
Prenatal visit at 12 4/7wks  Denies ctxs/cramping, LOF  No FM yet  A positive  Problem List Items Addressed This Visit        Other    Prenatal care, subsequent pregnancy in second trimester - Primary    Relevant Orders    Alpha fetoprotein, maternal    POCT urine dip (Completed)    16 weeks gestation of pregnancy     Had one episode of dark blood per vagina ~2 weeks ago  Called and was given advice to call back if continued  Resolved and has not occurred again  Discussed possible causes, and reassurance provided  Level 2 scheduled  PN panel done and normal           Advanced maternal age in multigravida, second trimester     NIPT low risk  MSAFP given today  Contraception management     Plan for  to have vasectomy

## 2023-02-15 NOTE — ASSESSMENT & PLAN NOTE
Had one episode of dark blood per vagina ~2 weeks ago  Called and was given advice to call back if continued  Resolved and has not occurred again  Discussed possible causes, and reassurance provided  Level 2 scheduled    PN panel done and normal

## 2023-02-22 ENCOUNTER — APPOINTMENT (OUTPATIENT)
Dept: LAB | Facility: CLINIC | Age: 33
End: 2023-02-22

## 2023-02-22 DIAGNOSIS — Z34.82 PRENATAL CARE, SUBSEQUENT PREGNANCY IN SECOND TRIMESTER: ICD-10-CM

## 2023-02-24 LAB
2ND TRIMESTER 4 SCREEN SERPL-IMP: NORMAL
AFP ADJ MOM SERPL: 1.08
AFP INTERP AMN-IMP: NORMAL
AFP INTERP SERPL-IMP: NORMAL
AFP INTERP SERPL-IMP: NORMAL
AFP SERPL-MCNC: 47.3 NG/ML
AGE AT DELIVERY: 35 YR
GA METHOD: NORMAL
GA: 17.6 WEEKS
IDDM PATIENT QL: NO
MULTIPLE PREGNANCY: NO
NEURAL TUBE DEFECT RISK FETUS: 9540 %

## 2023-03-14 ENCOUNTER — ROUTINE PRENATAL (OUTPATIENT)
Facility: HOSPITAL | Age: 33
End: 2023-03-14

## 2023-03-14 VITALS
DIASTOLIC BLOOD PRESSURE: 62 MMHG | HEIGHT: 66 IN | BODY MASS INDEX: 24.62 KG/M2 | WEIGHT: 153.2 LBS | HEART RATE: 87 BPM | SYSTOLIC BLOOD PRESSURE: 110 MMHG

## 2023-03-14 DIAGNOSIS — Z36.86 ENCOUNTER FOR ANTENATAL SCREENING FOR CERVICAL LENGTH: ICD-10-CM

## 2023-03-14 DIAGNOSIS — Z36.3 ENCOUNTER FOR ANTENATAL SCREENING FOR MALFORMATION: ICD-10-CM

## 2023-03-14 DIAGNOSIS — Z3A.20 20 WEEKS GESTATION OF PREGNANCY: ICD-10-CM

## 2023-03-14 DIAGNOSIS — O09.522 ADVANCED MATERNAL AGE IN MULTIGRAVIDA, SECOND TRIMESTER: Primary | ICD-10-CM

## 2023-03-14 NOTE — PROGRESS NOTES
[address]: Ms Kevin Lake was seen today for anatomic survey and cervical length screening ultrasound  See ultrasound report under "OB Procedures" tab  The time spent on this established patient on the encounter date included 5 minutes previsit service time reviewing records and precharting, 5 minutes face-to-face service time counseling regarding results and coordinating care, and  5 minutes charting, totalling 15 minutes    Please don't hesitate to contact our office with any concerns or questions   -Krunal Davis MD

## 2023-03-14 NOTE — PATIENT INSTRUCTIONS
Thank you for choosing us for your  care today  If you have any questions about your ultrasound or care, please do not hesitate to contact us or your primary obstetrician  Some general instructions for your pregnancy are:    Protect against coronavirus: get vaccinated - pregnant women are increased risk of severe COVID  Notify your primary care doctor if you have any symptoms  Exercise: Aim for 22 minutes per day (150 minutes per week) of regular exercise  Walking is great! Nutrition: aim for calcium-rich and iron-rich foods as well as healthy sources of protein  Learn about Preeclampsia: preeclampsia is a common, serious high blood pressure complication in pregnancy  A blood pressure of 071PZCW (systolic or top number) or 51KHIT (diastolic or bottom number) is not normal and needs evaluation by your doctor  Aspirin is sometimes prescribed in early pregnancy to prevent preeclampsia in women with risk factors - ask your obstetrician if you should be on this medication  If you smoke, try to reduce how many cigarettes you smoke or try to quit completely  Do not vape  Other warning signs to watch out for in pregnancy or postpartum: chest pain, obstructed breathing or shortness of breath, seizures, thoughts of hurting yourself or your baby, bleeding, a painful or swollen leg, fever, or headache (see [facility] POST-BIRTH Warning Signs campaign)  If these happen call 911  Itching is also not normal in pregnancy and if you experience this, especially over your hands and feet, potentially worse at night, notify your doctors

## 2023-03-16 ENCOUNTER — ROUTINE PRENATAL (OUTPATIENT)
Dept: OBGYN CLINIC | Facility: CLINIC | Age: 33
End: 2023-03-16

## 2023-03-16 VITALS
HEART RATE: 93 BPM | DIASTOLIC BLOOD PRESSURE: 70 MMHG | BODY MASS INDEX: 25.04 KG/M2 | SYSTOLIC BLOOD PRESSURE: 110 MMHG | OXYGEN SATURATION: 99 % | WEIGHT: 152.8 LBS

## 2023-03-16 DIAGNOSIS — Z30.09 ENCOUNTER FOR OTHER GENERAL COUNSELING OR ADVICE ON CONTRACEPTION: ICD-10-CM

## 2023-03-16 DIAGNOSIS — Z3A.20 20 WEEKS GESTATION OF PREGNANCY: Primary | ICD-10-CM

## 2023-03-16 DIAGNOSIS — O09.522 ADVANCED MATERNAL AGE IN MULTIGRAVIDA, SECOND TRIMESTER: ICD-10-CM

## 2023-03-16 LAB
SL AMB  POCT GLUCOSE, UA: NEGATIVE
SL AMB  POCT GLUCOSE, UA: NORMAL
SL AMB POCT URINE PROTEIN: NEGATIVE
SL AMB POCT URINE PROTEIN: NORMAL

## 2023-03-16 NOTE — ASSESSMENT & PLAN NOTE
28 yo  at 20+5 here for routine ob visit  No contracitons, leaking or bleeding  Fetal flutters  Return in 4 wks

## 2023-03-16 NOTE — PROGRESS NOTES
Contraception management  Planning vasectomy    Advanced maternal age in multigravida, second trimester  NIPT low risk  AFP screen negative  30-32 growth  20 weeks gestation of pregnancy  28 yo  at 25+11 here for routine ob visit  No contracitons, leaking or bleeding  Fetal flutters  Return in 4 wks

## 2023-03-18 ENCOUNTER — NURSE TRIAGE (OUTPATIENT)
Dept: OTHER | Facility: OTHER | Age: 33
End: 2023-03-18

## 2023-03-19 NOTE — TELEPHONE ENCOUNTER
Reason for Disposition  • [1] MILD dizziness (e g , walking normally) AND [2] has NOT been evaluated by physician for this  (Exception: dizziness caused by heat exposure, sudden standing, or poor fluid intake)    Answer Assessment - Initial Assessment Questions  1  DESCRIPTION: "Describe your dizziness "      Started to feel lightheaded around 1030 am  Continues at this time   2  LIGHTHEADED: "Do you feel lightheaded?" (e g , somewhat faint, woozy, weak upon standing)     Woozy, weak upon standing   3  VERTIGO: "Do you feel like either you or the room is spinning or tilting?" (i e  vertigo)    Started to see black,   4  SEVERITY: "How bad is it?"  "Do you feel like you are going to faint?" "Can you stand and walk?"    - MILD: Feels slightly dizzy, but walking normally  - MODERATE: Feels very unsteady when walking, but not falling; interferes with normal activities (e g , school, work)   - SEVERE: Unable to walk without falling, or requires assistance to walk without falling; feels like passing out now  Mild   5  ONSET:  "When did the dizziness begin?"     3/18  6  AGGRAVATING FACTORS: "Does anything make it worse?" (e g , standing, change in head position)      Changing position rapidly   7  HEART RATE: "Can you tell me your heart rate?" "How many beats in 15 seconds?"  (Note: not all patients can do this)        Elevated HR earlier this morning   8  CAUSE: "What do you think is causing the dizziness?"  Unsure  Busy today   9  RECURRENT SYMPTOM: "Have you had dizziness before?" If Yes, ask: "When was the last time?" "What happened that time?"    Confirms but never to this extreme   10  OTHER SYMPTOMS: "Do you have any other symptoms?" (e g , fever, chest pain, vomiting, diarrhea, bleeding)      Denies    11   PREGNANCY: "Is there any chance you are pregnant?" "When was your last menstrual period?"     21W0D    Protocols used: [facility]

## 2023-03-19 NOTE — TELEPHONE ENCOUNTER
Regardin weeks pregnant/ Dizzy  ----- Message from Nata Miles sent at 3/18/2023  8:49 PM EDT -----  "I am 21 weeks pregnant   Today I almost passed out of how dizzy I got"

## 2023-03-19 NOTE — TELEPHONE ENCOUNTER
C/o brief incident of dizziness and nearly passing out earlier today around 1030 AM  Patient is 21W0D gestation  Reports eating a banana and some coffee before starting day  Reports mild dizziness continues present but able to ambulate ad inessa  Denies distress  No additional symptoms reported  Care advice given  Informed to call back if worsening/developing symptoms  Verbalized understanding, agreeable with disposition  No further questions

## 2023-03-21 PROBLEM — Z3A.21 21 WEEKS GESTATION OF PREGNANCY: Status: ACTIVE | Noted: 2023-02-15

## 2023-03-22 ENCOUNTER — ROUTINE PRENATAL (OUTPATIENT)
Dept: OBGYN CLINIC | Facility: CLINIC | Age: 33
End: 2023-03-22

## 2023-03-22 VITALS
HEART RATE: 86 BPM | BODY MASS INDEX: 24.91 KG/M2 | WEIGHT: 152 LBS | DIASTOLIC BLOOD PRESSURE: 78 MMHG | SYSTOLIC BLOOD PRESSURE: 112 MMHG

## 2023-03-22 DIAGNOSIS — Z87.898 HISTORY OF BRADYCARDIA: Primary | ICD-10-CM

## 2023-03-22 DIAGNOSIS — Z3A.21 21 WEEKS GESTATION OF PREGNANCY: ICD-10-CM

## 2023-03-22 DIAGNOSIS — R55 NEAR SYNCOPE: ICD-10-CM

## 2023-03-22 DIAGNOSIS — R42 DIZZINESS: ICD-10-CM

## 2023-03-22 NOTE — PROGRESS NOTES
Patient is here for problem visit  She is 21w4d today  She states on Saturday morning she was at her daughter's dance competition and had been standing a lot, when she became lightheaded, started seeing black and then slid to the floor and fainted  She only had a cup of coffee and a banana that morning  She ate more later and felt a little better  She has not had any more episodes since  She is feeling movement  Level II US on 3/14/23

## 2023-03-23 PROBLEM — R42 DIZZINESS: Status: ACTIVE | Noted: 2023-03-23

## 2023-03-23 PROBLEM — R42 DIZZINESS: Status: RESOLVED | Noted: 2023-03-23 | Resolved: 2023-03-23

## 2023-03-23 PROBLEM — Z87.898 HISTORY OF BRADYCARDIA: Status: ACTIVE | Noted: 2023-03-23

## 2023-03-23 PROBLEM — R55 NEAR SYNCOPE: Status: ACTIVE | Noted: 2023-03-23

## 2023-03-23 NOTE — ASSESSMENT & PLAN NOTE
Episode of dizziness and near syncope  See note from 3/22/23  Hx of bradycardia - on day of episode HR ranging from 45-140s  Recommended f/u with cardiology for evaluation

## 2023-03-23 NOTE — PROGRESS NOTES
Malik Ayala  1990    S:  [address]  is a 27 y o  [id number] @21w4d who presents for a problem visit  Four days ago she experienced an episode of near syncope while attending her daughter's dance competition  Admittedly had only had a cup of coffee that day and was walking back and forth trying to get her daughter/their team ready and in location  Was talking to some friends when she began to feel lightheaded  She sat down and ate a banana, then went to changing room  When she arrived in changing room she felt increased lightheadedness and vision began to go black  She cradled her abdomen and slid to floor  Denies fall, head injury, and abd trauma  She denies ever losing consciousness and was able to stand shortly after to return to her , before she called this office  Reports to feeling fine afterwards - did drink water and eat more  Has appreciated daily movement since then  She denies associated CP, palpitation, HA, other vision change, abd pain, N/V  No arm, jaw,neck pain, numbness, tingling weakness  Reports dizziness experienced in her second pregnancy but reports a normal EKG in the hospital  She was seen by her PCP for fatigue and dizziness sperate from pregnancy and had an EKG a few years ago, also normal  Reports dx of bradycardia  On the day of episode she reports HR ranging between 45 bpm -140s bpm  This is normal for her noting her HR has dropped as low as 39 bpm during this pregnancy and will rise into the 140s with activity (I e  walking up stairs)  States at one time she was thought to have POTS but does not recall formal dx  She denies any other cardiac hx to include arrhythmias and cardiomyopathy  No subsequent occurrence  Review of Systems   Constitutional: Neg F/C/S  Respiratory: Negative SOB, difficulty breathing  Cardiovascular: Negative CP, palpitations, swelling    Gastrointestinal: Negative for abd pain, N/V     Genitourinary: Negative for pelvic pain, LOF, vaginal bleeding  Neuro: + dizziness  neg numbness, tingling, weakness, RIVERA     O:  /78   Pulse 86   Wt 68 9 kg (152 lb)   LMP 10/22/2022 (Exact Date)   BMI 24 91 kg/m²        Orthostatic BP - checked - no significant change      She appears well and is in no distress  Normocephalic, atraumatic  RRR  Normal respiratory effort, CTA   Abdomen is soft and nontender, gravid  No focal neurological deficits  Normal mood, affect, and behavior  A/P:  Diagnoses and all orders for this visit:    Dizziness    -     Ambulatory Referral to Cardiology; Future    History of bradycardia    -     Ambulatory Referral to Cardiology; Future    21 weeks gestation of pregnancy    -     Ambulatory Referral to Cardiology; Future    Near syncope      Reviewed common BP changes and effects of low blood sugar/dehydration in pregnancy that can account for episodes  Discussed bradycardia can be seen with runners/athletes, however, in conjunction with a near syncopal episode, would like evaluation by cardiology  Advised on adequate hydration, eating small frequent meals, sitting for breaks regularly  Can try compression stockings and modest increase in salt intake  Reviewed s/sx to call / seek care with  RTO for routine PNV, sooner as needed

## 2023-04-14 PROBLEM — Z3A.24 24 WEEKS GESTATION OF PREGNANCY: Status: ACTIVE | Noted: 2023-02-15

## 2023-05-01 ENCOUNTER — APPOINTMENT (OUTPATIENT)
Dept: LAB | Age: 33
End: 2023-05-01

## 2023-05-01 DIAGNOSIS — Z34.82 ENCOUNTER FOR SUPERVISION OF OTHER NORMAL PREGNANCY, SECOND TRIMESTER: ICD-10-CM

## 2023-05-01 LAB
BASOPHILS # BLD AUTO: 0.02 THOUSANDS/ΜL (ref 0–0.1)
BASOPHILS NFR BLD AUTO: 0 % (ref 0–1)
EOSINOPHIL # BLD AUTO: 0.12 THOUSAND/ΜL (ref 0–0.61)
EOSINOPHIL NFR BLD AUTO: 2 % (ref 0–6)
ERYTHROCYTE [DISTWIDTH] IN BLOOD BY AUTOMATED COUNT: 14 % (ref 11.6–15.1)
FERRITIN SERPL-MCNC: 6 NG/ML (ref 8–388)
GLUCOSE 1H P 50 G GLC PO SERPL-MCNC: 145 MG/DL (ref 40–134)
HCT VFR BLD AUTO: 29.9 % (ref 34.8–46.1)
HGB BLD-MCNC: 9.6 G/DL (ref 11.5–15.4)
IMM GRANULOCYTES # BLD AUTO: 0.07 THOUSAND/UL (ref 0–0.2)
IMM GRANULOCYTES NFR BLD AUTO: 1 % (ref 0–2)
LYMPHOCYTES # BLD AUTO: 1.46 THOUSANDS/ΜL (ref 0.6–4.47)
LYMPHOCYTES NFR BLD AUTO: 20 % (ref 14–44)
MCH RBC QN AUTO: 27.6 PG (ref 26.8–34.3)
MCHC RBC AUTO-ENTMCNC: 32.1 G/DL (ref 31.4–37.4)
MCV RBC AUTO: 86 FL (ref 82–98)
MONOCYTES # BLD AUTO: 0.36 THOUSAND/ΜL (ref 0.17–1.22)
MONOCYTES NFR BLD AUTO: 5 % (ref 4–12)
NEUTROPHILS # BLD AUTO: 5.15 THOUSANDS/ΜL (ref 1.85–7.62)
NEUTS SEG NFR BLD AUTO: 72 % (ref 43–75)
NRBC BLD AUTO-RTO: 0 /100 WBCS
PLATELET # BLD AUTO: 278 THOUSANDS/UL (ref 149–390)
PMV BLD AUTO: 10.7 FL (ref 8.9–12.7)
RBC # BLD AUTO: 3.48 MILLION/UL (ref 3.81–5.12)
WBC # BLD AUTO: 7.18 THOUSAND/UL (ref 4.31–10.16)

## 2023-05-02 ENCOUNTER — TELEPHONE (OUTPATIENT)
Dept: OBGYN CLINIC | Facility: CLINIC | Age: 33
End: 2023-05-02

## 2023-05-02 DIAGNOSIS — O99.810 ABNORMAL GLUCOSE AFFECTING PREGNANCY: Primary | ICD-10-CM

## 2023-05-02 LAB — TREPONEMA PALLIDUM IGG+IGM AB [PRESENCE] IN SERUM OR PLASMA BY IMMUNOASSAY: NORMAL

## 2023-05-02 NOTE — TELEPHONE ENCOUNTER
Per comm consent lm to pt with 3hr GTT and instructions  Orders are in chart  1  fast 8-10 hrs  2  Alina Matamoros one of the main hospital labs  3   can call the lab to see when they open and if an appt is necessary or recommended     add an extra iron supplement daily  can get them over the counter, they are about 325mg  take that separately form your prenatal vitamin with some vitamin C for better absorption

## 2023-05-02 NOTE — TELEPHONE ENCOUNTER
----- Message from Dulce Briones MD sent at 5/2/2023  8:23 AM EDT -----  Please call Joann Villalba regarding her abnormal result  She has iron deficiency anemia and should start taking an iron supplement   Additionally, she failed her 1 hour and will need a 3 hour

## 2023-05-05 ENCOUNTER — CONSULT (OUTPATIENT)
Dept: CARDIOLOGY CLINIC | Facility: CLINIC | Age: 33
End: 2023-05-05

## 2023-05-05 VITALS
DIASTOLIC BLOOD PRESSURE: 60 MMHG | HEIGHT: 66 IN | SYSTOLIC BLOOD PRESSURE: 108 MMHG | BODY MASS INDEX: 26.36 KG/M2 | HEART RATE: 140 BPM | WEIGHT: 164 LBS

## 2023-05-05 DIAGNOSIS — R00.0 TACHYCARDIA: Primary | ICD-10-CM

## 2023-05-05 DIAGNOSIS — Z3A.21 21 WEEKS GESTATION OF PREGNANCY: ICD-10-CM

## 2023-05-05 DIAGNOSIS — J45.20 MILD INTERMITTENT ASTHMA WITHOUT COMPLICATION: Primary | ICD-10-CM

## 2023-05-05 DIAGNOSIS — Z87.898 HISTORY OF BRADYCARDIA: ICD-10-CM

## 2023-05-05 RX ORDER — ALBUTEROL SULFATE 90 UG/1
2 AEROSOL, METERED RESPIRATORY (INHALATION) EVERY 6 HOURS PRN
COMMUNITY
End: 2023-05-05 | Stop reason: SDUPTHER

## 2023-05-05 RX ORDER — ALBUTEROL SULFATE 90 UG/1
2 AEROSOL, METERED RESPIRATORY (INHALATION) EVERY 6 HOURS PRN
Qty: 8 G | Refills: 2 | Status: SHIPPED | OUTPATIENT
Start: 2023-05-05

## 2023-05-05 RX ORDER — LANOLIN ALCOHOL/MO/W.PET/CERES
CREAM (GRAM) TOPICAL
COMMUNITY
Start: 2023-05-02

## 2023-05-05 NOTE — PROGRESS NOTES
CARDIOLOGY OFFICE VISIT     PATIENT INFORMATION     Alisha Navarro   27 y o  female   MRN: [id number]    ASSESSMENT/PLAN     1  Palpitations  · Presenting with palpitations at rest and with excerption  Accompanied by SOB  She is 28 weeks pregnant  This is her 5th pregnancy  · EKG: ST rate 140, RAD, rate related St changes  · Had dizziness during second pregnancy  Holter showed NSR, some PVCs and supraventricular ectopy  HR  BPM    · Will order echo and 48 hrs Holter  · Will get TSH  Advised on no more than 1 cup of coffee  · F/u in 2-4 weeks  · Will call her with results  Diagnoses and all orders for this visit:    Tachycardia  -     POCT ECG  -     TSH, 3rd generation with Free T4 reflex; Future  -     Echo complete w/ contrast if indicated; Future  -     Holter monitor; Future    History of bradycardia  -     Ambulatory Referral to Cardiology    21 weeks gestation of pregnancy  -     Ambulatory Referral to Cardiology    Other orders  -     ferrous sulfate 325 (65 FE) MG EC tablet      Schedule a follow-up appointment in 2-4 weeks  HEALTH MAINTENANCE     Immunization History   Administered Date(s) Administered    COVID-19 PFIZER VACCINE 0 3 ML IM 12/21/2020, 01/11/2021, 09/25/2021    Hep B, Adolescent or Pediatric 08/01/1993, 10/01/1993, 06/01/1994    INFLUENZA 01/01/2011, 09/18/2012, 01/22/2015    Influenza, injectable, quadrivalent, preservative free 0 5 mL 10/12/2018    MMR 07/01/2009    Td (adult), Unspecified 07/09/2009    Tdap 06/09/2014, 06/26/2014, 08/31/2018     CHIEF COMPLAINT     Chief Complaint   Patient presents with    Consult     SOB- on exertion- worsened since pregnancy, hyperawareness of heartbeat, dizziness with elevated HR  HISTORY OF PRESENT ILLNESS     This is 28 yo female presenting with palpitations  She is 28 weeks pregnant  This is her 5th pregnancy  She has been having palpitations at rest and with walking       She admits that she has low exercise "tolerance and has been having palpitations since week 20 of pregnancy  She has apple watch and noticed that her HR is high at rest and with walking  It is usually followed by SOB  Denies any chest pain  No sx and symptoms of volume overload  She is active and used to run and exercise before pregnancy  Her exercise tolerance has gone down for past 8 weeks  She had hx of dizziness during second pregnancy  Holter showed NSR, some PVCs and supraventricular ectopy  HR  BPM  No hx of lung disease  REVIEW OF SYSTEMS     Review of Systems   HENT: Negative for congestion, sinus pressure, sinus pain and sore throat  Respiratory: Positive for shortness of breath  Negative for apnea, chest tightness and stridor  Cardiovascular: Positive for palpitations  Negative for chest pain and leg swelling  Gastrointestinal: Negative for abdominal pain, constipation and diarrhea  OBJECTIVE     Vitals:    05/05/23 1009   BP: 108/60   BP Location: Right arm   Patient Position: Sitting   Cuff Size: Standard   Pulse: (!) 140   Weight: 74 4 kg (164 lb)   Height: 5' 5 5\" (1 664 m)     Physical Exam  Vitals reviewed  Constitutional:       General: She is not in acute distress  Appearance: She is well-developed  She is not diaphoretic  Cardiovascular:      Rate and Rhythm: Normal rate and regular rhythm  Heart sounds: Normal heart sounds  No murmur heard  No friction rub  Pulmonary:      Effort: Pulmonary effort is normal  No respiratory distress  Breath sounds: Normal breath sounds  No stridor  Abdominal:      General: Bowel sounds are normal  There is no distension  Palpations: Abdomen is soft  Tenderness: There is no abdominal tenderness  There is no rebound  Psychiatric:         Behavior: Behavior normal          Thought Content:  Thought content normal        CURRENT MEDICATIONS     Current Outpatient Medications:     ferrous sulfate 325 (65 FE) MG EC tablet, , Disp: , Rfl:     " Prenatal Vit-Fe Fumarate-FA (PRENATAL VITAMINS PO), Take by mouth, Disp: , Rfl:     PAST MEDICAL & SURGICAL HISTORY     Past Medical History:   Diagnosis Date    Anemia     mild anemia with prev pregnancy 2018    Anorexia     resolved    Anxiety     Asthma     hx of asthma    Migraine     As Child/Resolved    Normal delivery     2013 daughter, 2014 daughter, 2016 daughter    PONV (postoperative nausea and vomiting)     Varicella     As child     Past Surgical History:   Procedure Laterality Date    ARM SKIN LESION BIOPSY / EXCISION Right     MYRINGOTOMY W/ TUBES      WISDOM TOOTH EXTRACTION       SOCIAL & FAMILY HISTORY     Social History     Socioeconomic History    Marital status: /Civil Union     Spouse name: Not on file    Number of children: Not on file    Years of education: Not on file    Highest education level: Not on file   Occupational History    Occupation: Physician Assistant     Comment: [employer]   Tobacco Use    Smoking status: Never    Smokeless tobacco: Never   Vaping Use    Vaping Use: Never used   Substance and Sexual Activity    Alcohol use: Yes     Comment: social none with pregnancy    Drug use: No     Comment: denies self or   denies family use    Sexual activity: Yes     Partners: Male     Birth control/protection: Coitus interruptus     Comment: withdrawl   Other Topics Concern    Not on file   Social History Narrative    Not on file     Social Determinants of Health     Financial Resource Strain: Not on file   Food Insecurity: Not on file   Transportation Needs: Not on file   Physical Activity: Not on file   Stress: Not on file   Social Connections: Not on file   Intimate Partner Violence: Not on file   Housing Stability: Not on file     Social History     Substance and Sexual Activity   Alcohol Use Yes    Comment: social none with pregnancy     Social History     Substance and Sexual Activity   Drug Use No    Comment: denies self or   denies family use     Social History     Tobacco Use   Smoking Status Never   Smokeless Tobacco Never     Family History   Problem Relation Age of Onset    Hypertension Mother     Uterine cancer Mother 48    Hypothyroidism Mother     Ankylosing spondylitis Mother    Buzzy Marker Cancer Father     Prostate cancer Father 62    No Known Problems Sister     No Known Problems Sister     No Known Problems Brother     No Known Problems Daughter     No Known Problems Daughter     No Known Problems Daughter     No Known Problems Daughter     Diabetes Maternal Grandmother     Stroke Maternal Grandmother     Cancer Maternal Grandfather         lung    Lung cancer Maternal Grandfather     Heart disease Paternal Grandfather     Breast cancer Neg Hx     Colon cancer Neg Hx     Ovarian cancer Neg Hx     Cervical cancer Neg Hx      ==  Vikki Inman, DO  PGY-4  CARDIOLOGY FELLOW

## 2023-05-08 ENCOUNTER — HOSPITAL ENCOUNTER (OUTPATIENT)
Dept: NON INVASIVE DIAGNOSTICS | Facility: CLINIC | Age: 33
Discharge: HOME | End: 2023-05-08

## 2023-05-08 VITALS
WEIGHT: 164 LBS | HEART RATE: 95 BPM | DIASTOLIC BLOOD PRESSURE: 60 MMHG | SYSTOLIC BLOOD PRESSURE: 108 MMHG | HEIGHT: 66 IN | BODY MASS INDEX: 26.36 KG/M2

## 2023-05-08 DIAGNOSIS — R00.0 TACHYCARDIA: ICD-10-CM

## 2023-05-08 LAB
AORTIC ROOT: 3 CM
APICAL FOUR CHAMBER EJECTION FRACTION: 62 %
ASCENDING AORTA: 3 CM
E WAVE DECELERATION TIME: 115 MS
FRACTIONAL SHORTENING: 31 (ref 28–44)
INTERVENTRICULAR SEPTUM IN DIASTOLE (PARASTERNAL SHORT AXIS VIEW): 0.6 CM
INTERVENTRICULAR SEPTUM: 0.6 CM (ref 0.6–1.1)
LAAS-AP2: 22 CM2
LAAS-AP4: 19.3 CM2
LEFT ATRIUM SIZE: 3.5 CM
LEFT INTERNAL DIMENSION IN SYSTOLE: 3.7 CM (ref 2.1–4)
LEFT VENTRICULAR INTERNAL DIMENSION IN DIASTOLE: 5.4 CM (ref 3.5–6)
LEFT VENTRICULAR POSTERIOR WALL IN END DIASTOLE: 0.7 CM
LEFT VENTRICULAR STROKE VOLUME: 82 ML
LVSV (TEICH): 82 ML
MV E'TISSUE VEL-SEP: 22 CM/S
MV PEAK A VEL: 0.66 M/S
MV PEAK E VEL: 97 CM/S
MV STENOSIS PRESSURE HALF TIME: 33 MS
MV VALVE AREA P 1/2 METHOD: 6.67
RIGHT ATRIUM AREA SYSTOLE A4C: 14.1 CM2
RIGHT VENTRICLE ID DIMENSION: 3 CM
SL CV LEFT ATRIUM LENGTH A2C: 4.9 CM
SL CV LV EF: 60
SL CV PED ECHO LEFT VENTRICLE DIASTOLIC VOLUME (MOD BIPLANE) 2D: 142 ML
SL CV PED ECHO LEFT VENTRICLE SYSTOLIC VOLUME (MOD BIPLANE) 2D: 60 ML
TRICUSPID ANNULAR PLANE SYSTOLIC EXCURSION: 2.5 CM

## 2023-05-11 ENCOUNTER — ROUTINE PRENATAL (OUTPATIENT)
Dept: OBGYN CLINIC | Facility: CLINIC | Age: 33
End: 2023-05-11

## 2023-05-11 VITALS — DIASTOLIC BLOOD PRESSURE: 62 MMHG | SYSTOLIC BLOOD PRESSURE: 110 MMHG | BODY MASS INDEX: 26.61 KG/M2 | WEIGHT: 162.4 LBS

## 2023-05-11 DIAGNOSIS — Z34.93 PRENATAL CARE IN THIRD TRIMESTER: Primary | ICD-10-CM

## 2023-05-11 DIAGNOSIS — Z3A.28 28 WEEKS GESTATION OF PREGNANCY: ICD-10-CM

## 2023-05-11 DIAGNOSIS — R73.09 ELEVATED GLUCOSE TOLERANCE TEST: ICD-10-CM

## 2023-05-11 DIAGNOSIS — R55 NEAR SYNCOPE: ICD-10-CM

## 2023-05-11 DIAGNOSIS — Z30.09 ENCOUNTER FOR OTHER GENERAL COUNSELING OR ADVICE ON CONTRACEPTION: ICD-10-CM

## 2023-05-11 DIAGNOSIS — D50.9 IRON DEFICIENCY ANEMIA, UNSPECIFIED IRON DEFICIENCY ANEMIA TYPE: ICD-10-CM

## 2023-05-11 DIAGNOSIS — O09.522 ADVANCED MATERNAL AGE IN MULTIGRAVIDA, SECOND TRIMESTER: ICD-10-CM

## 2023-05-11 DIAGNOSIS — J45.20 MILD INTERMITTENT ASTHMA WITHOUT COMPLICATION: ICD-10-CM

## 2023-05-11 PROBLEM — D64.9 ANEMIA: Status: ACTIVE | Noted: 2023-05-11

## 2023-05-11 LAB
DME PARACHUTE DELIVERY DATE ACTUAL: NORMAL
DME PARACHUTE DELIVERY DATE REQUESTED: NORMAL
DME PARACHUTE ITEM DESCRIPTION: NORMAL
DME PARACHUTE ORDER STATUS: NORMAL
DME PARACHUTE SUPPLIER NAME: NORMAL
DME PARACHUTE SUPPLIER PHONE: NORMAL
SL AMB  POCT GLUCOSE, UA: NEGATIVE
SL AMB POCT URINE PROTEIN: NEGATIVE

## 2023-05-11 NOTE — PROGRESS NOTES
Prenatal visit @ 28w5d  Denies any bleeding, LOF or ctxs  NL FM  She had an episode of near syncope at 20 weeks and has been having cardiac symptoms as well as chest tightness  Following with PCP and cardiology and seems to have turned a corner this week since starting albuterol PRN and oral iron  Problem List Items Addressed This Visit        Respiratory    Asthma     Has felt that her asthma was increasing her cardiac symptoms  Using inhaler in afternoon for the past week for symptomatic relief and feels much better  Cardiovascular and Mediastinum    Near syncope     She saw Dr Timmy Garber from cardiology who confirmed a normal echo, no concerning changes in EKG  Just finished a Holter monitor yesterday - awaiting final results but feels better since resuming albuterol  She also started oral iron at Dr Jose Rafael Posada recommendation which is also helping  Has not had constipation with it which was a problem in the past   She will call if this becomes a problem to consider IV iron  Other    28 weeks gestation of pregnancy     26 week labs completed - see below for 1h GCT; see below for CBC, RPR normal    Tdap next visit at patient request   Breast pump ordered  Has a pediatrician (uses AB[facility])  Yellow folder given and reviewed today  Various routes of delivery discussed including risks/benefits of each  All questions answered, and consent signed  ? Low lying placenta at 20 weeks though placental visualization was limited - aware will likely resolve at 32 week scan but reinforced importance of this follow up  Advanced maternal age in multigravida, second trimester     NIPT and MSAFP low risk  Level 2 normal   Has 32 week growth scan  Contraception management     Planning vasectomy for partner  Elevated glucose tolerance test     1h GCT elevated at 28 weeks    3h GTT ordered - patient has not had time to go yet but will do it next week now that she is feeling better from cardiac/respiratory standpoint  Had same pattern in last two pregnancies but both times the 3h GTT was normal           Anemia     Started oral iron this week  Repeat CBC at 32 weeks to ensure adequate response           Other Visit Diagnoses     Prenatal care in third trimester    -  Primary    Relevant Orders    POCT urine dip

## 2023-05-11 NOTE — ASSESSMENT & PLAN NOTE
Has felt that her asthma was increasing her cardiac symptoms  Using inhaler in afternoon for the past week for symptomatic relief and feels much better

## 2023-05-11 NOTE — ASSESSMENT & PLAN NOTE
1h GCT elevated at 28 weeks  3h GTT ordered - patient has not had time to go yet but will do it next week now that she is feeling better from cardiac/respiratory standpoint    Had same pattern in last two pregnancies but both times the 3h GTT was normal

## 2023-05-11 NOTE — ASSESSMENT & PLAN NOTE
She saw Dr Nahum Plasencia from cardiology who confirmed a normal echo, no concerning changes in EKG  Just finished a Holter monitor yesterday - awaiting final results but feels better since resuming albuterol  She also started oral iron at Dr Remedios Fan recommendation which is also helping  Has not had constipation with it which was a problem in the past   She will call if this becomes a problem to consider IV iron

## 2023-05-11 NOTE — ASSESSMENT & PLAN NOTE
28 week labs completed - see below for 1h GCT; see below for CBC, RPR normal    Tdap next visit at patient request   Breast pump ordered  Has a pediatrician (uses ABW)  Yellow folder given and reviewed today  Various routes of delivery discussed including risks/benefits of each  All questions answered, and consent signed  ? Low lying placenta at 20 weeks though placental visualization was limited - aware will likely resolve at 32 week scan but reinforced importance of this follow up

## 2023-05-12 ENCOUNTER — APPOINTMENT (OUTPATIENT)
Dept: LAB | Facility: CLINIC | Age: 33
End: 2023-05-12

## 2023-05-12 DIAGNOSIS — O99.810 ABNORMAL GLUCOSE AFFECTING PREGNANCY: ICD-10-CM

## 2023-05-12 DIAGNOSIS — R00.0 TACHYCARDIA: ICD-10-CM

## 2023-05-12 LAB
GLUCOSE 1H P 100 G GLC PO SERPL-MCNC: 165 MG/DL (ref 70–183)
GLUCOSE 2H P 100 G GLC PO SERPL-MCNC: 107 MG/DL (ref 70–155)
GLUCOSE 3H P 100 G GLC PO SERPL-MCNC: 111 MG/DL (ref 70–140)
GLUCOSE P FAST SERPL-MCNC: 81 MG/DL (ref 65–94)
TSH SERPL DL<=0.05 MIU/L-ACNC: 2.52 UIU/ML (ref 0.45–4.5)

## 2023-05-18 ENCOUNTER — TELEPHONE (OUTPATIENT)
Dept: NON INVASIVE DIAGNOSTICS | Facility: HOSPITAL | Age: 33
End: 2023-05-18

## 2023-05-18 PROBLEM — Z3A.29 29 WEEKS GESTATION OF PREGNANCY: Status: ACTIVE | Noted: 2023-02-15

## 2023-05-18 NOTE — TELEPHONE ENCOUNTER
I called pt to update her on her Holter results  Her symptoms correlate with sinus tachycardia on Holter  she does has some PAC and PVCs  After further discussion, she would like to hold onto starting any medication and observe for now  I will see her after her delivery to repeat Holter and further monitor her tachycardia

## 2023-05-23 ENCOUNTER — TELEPHONE (OUTPATIENT)
Facility: HOSPITAL | Age: 33
End: 2023-05-23

## 2023-05-23 NOTE — TELEPHONE ENCOUNTER
Spoke to patient in regards to 6/8 appointment  Patient is aware that provider may not be in the office at the time of appointment and will contact the patient after the visit to review ultrasound results

## 2023-05-26 ENCOUNTER — ROUTINE PRENATAL (OUTPATIENT)
Dept: OBGYN CLINIC | Facility: CLINIC | Age: 33
End: 2023-05-26

## 2023-05-26 VITALS — WEIGHT: 167 LBS | BODY MASS INDEX: 27.37 KG/M2 | SYSTOLIC BLOOD PRESSURE: 112 MMHG | DIASTOLIC BLOOD PRESSURE: 64 MMHG

## 2023-05-26 DIAGNOSIS — Z23 NEED FOR DIPHTHERIA-TETANUS-PERTUSSIS (TDAP) VACCINE: ICD-10-CM

## 2023-05-26 DIAGNOSIS — Z3A.30 30 WEEKS GESTATION OF PREGNANCY: ICD-10-CM

## 2023-05-26 DIAGNOSIS — Z34.83 PRENATAL CARE, SUBSEQUENT PREGNANCY, THIRD TRIMESTER: Primary | ICD-10-CM

## 2023-05-26 DIAGNOSIS — D50.9 IRON DEFICIENCY ANEMIA, UNSPECIFIED IRON DEFICIENCY ANEMIA TYPE: ICD-10-CM

## 2023-05-26 DIAGNOSIS — O09.523 MULTIGRAVIDA OF ADVANCED MATERNAL AGE IN THIRD TRIMESTER: ICD-10-CM

## 2023-05-26 PROBLEM — Z30.9 CONTRACEPTION MANAGEMENT: Status: RESOLVED | Noted: 2023-02-15 | Resolved: 2023-05-26

## 2023-05-26 PROBLEM — Z3A.29 29 WEEKS GESTATION OF PREGNANCY: Status: RESOLVED | Noted: 2023-02-15 | Resolved: 2023-05-26

## 2023-05-26 PROBLEM — R73.09 ELEVATED GLUCOSE TOLERANCE TEST: Status: RESOLVED | Noted: 2023-05-11 | Resolved: 2023-05-26

## 2023-05-26 LAB
SL AMB  POCT GLUCOSE, UA: NORMAL
SL AMB POCT URINE PROTEIN: NORMAL

## 2023-05-26 NOTE — ASSESSMENT & PLAN NOTE
Good FM  Rare BH's  No VB  Birth plan returned  TDAP given today   32 week growth sono scheduled  Passed 3hr GTT

## 2023-05-26 NOTE — PROGRESS NOTES
28 wk labs completed  1hr CBH=698 /Failed  Passed 3 HR GTT    Following PCP and cardioogy for hx cardiac symptoms and chest tightness- started albuterol PRN and Iron  Repeat CBC at 32 wks  Order placed today  Has 32 wk Growth scan  Breast pump received  Delivery consent previously signed   Tdap vaccine given Today  Birth plan returned     Denies Leaking of Fluid, vaginal bleeding, contractions  +Fetal Movement

## 2023-05-26 NOTE — PROGRESS NOTES
30 weeks gestation of pregnancy  Good FM  Rare BH's  No VB  Birth plan returned  TDAP given today   32 week growth sono scheduled  Passed 3hr GTT    Anemia  Take oral iron  CBC order given - to be done @ 32 weeks

## 2023-06-05 ENCOUNTER — APPOINTMENT (OUTPATIENT)
Dept: LAB | Facility: CLINIC | Age: 33
End: 2023-06-05
Payer: COMMERCIAL

## 2023-06-05 DIAGNOSIS — D50.9 IRON DEFICIENCY ANEMIA, UNSPECIFIED IRON DEFICIENCY ANEMIA TYPE: ICD-10-CM

## 2023-06-05 LAB
BASOPHILS # BLD AUTO: 0.03 THOUSANDS/ÂΜL (ref 0–0.1)
BASOPHILS NFR BLD AUTO: 0 % (ref 0–1)
EOSINOPHIL # BLD AUTO: 0.22 THOUSAND/ÂΜL (ref 0–0.61)
EOSINOPHIL NFR BLD AUTO: 2 % (ref 0–6)
ERYTHROCYTE [DISTWIDTH] IN BLOOD BY AUTOMATED COUNT: 18.6 % (ref 11.6–15.1)
HCT VFR BLD AUTO: 31.4 % (ref 34.8–46.1)
HGB BLD-MCNC: 9.7 G/DL (ref 11.5–15.4)
IMM GRANULOCYTES # BLD AUTO: 0.16 THOUSAND/UL (ref 0–0.2)
IMM GRANULOCYTES NFR BLD AUTO: 2 % (ref 0–2)
LYMPHOCYTES # BLD AUTO: 2.37 THOUSANDS/ÂΜL (ref 0.6–4.47)
LYMPHOCYTES NFR BLD AUTO: 26 % (ref 14–44)
MCH RBC QN AUTO: 27.6 PG (ref 26.8–34.3)
MCHC RBC AUTO-ENTMCNC: 30.9 G/DL (ref 31.4–37.4)
MCV RBC AUTO: 89 FL (ref 82–98)
MONOCYTES # BLD AUTO: 0.75 THOUSAND/ÂΜL (ref 0.17–1.22)
MONOCYTES NFR BLD AUTO: 8 % (ref 4–12)
NEUTROPHILS # BLD AUTO: 5.52 THOUSANDS/ÂΜL (ref 1.85–7.62)
NEUTS SEG NFR BLD AUTO: 62 % (ref 43–75)
NRBC BLD AUTO-RTO: 0 /100 WBCS
PLATELET # BLD AUTO: 221 THOUSANDS/UL (ref 149–390)
PMV BLD AUTO: 10.1 FL (ref 8.9–12.7)
RBC # BLD AUTO: 3.52 MILLION/UL (ref 3.81–5.12)
WBC # BLD AUTO: 9.05 THOUSAND/UL (ref 4.31–10.16)

## 2023-06-05 PROCEDURE — 85025 COMPLETE CBC W/AUTO DIFF WBC: CPT

## 2023-06-05 PROCEDURE — 36415 COLL VENOUS BLD VENIPUNCTURE: CPT

## 2023-06-08 ENCOUNTER — ULTRASOUND (OUTPATIENT)
Facility: HOSPITAL | Age: 33
End: 2023-06-08
Payer: COMMERCIAL

## 2023-06-08 ENCOUNTER — TELEPHONE (OUTPATIENT)
Dept: OBGYN CLINIC | Facility: CLINIC | Age: 33
End: 2023-06-08

## 2023-06-08 VITALS
BODY MASS INDEX: 27.32 KG/M2 | HEART RATE: 90 BPM | DIASTOLIC BLOOD PRESSURE: 70 MMHG | HEIGHT: 66 IN | SYSTOLIC BLOOD PRESSURE: 122 MMHG | WEIGHT: 169.97 LBS

## 2023-06-08 DIAGNOSIS — Z03.72 PLACENTAL PROBLEM SUSPECTED BUT NOT FOUND: ICD-10-CM

## 2023-06-08 DIAGNOSIS — O09.523 MULTIGRAVIDA OF ADVANCED MATERNAL AGE IN THIRD TRIMESTER: Primary | ICD-10-CM

## 2023-06-08 DIAGNOSIS — Z3A.32 32 WEEKS GESTATION OF PREGNANCY: ICD-10-CM

## 2023-06-08 DIAGNOSIS — D50.9 IRON DEFICIENCY ANEMIA, UNSPECIFIED IRON DEFICIENCY ANEMIA TYPE: Primary | ICD-10-CM

## 2023-06-08 PROCEDURE — 76817 TRANSVAGINAL US OBSTETRIC: CPT | Performed by: OBSTETRICS & GYNECOLOGY

## 2023-06-08 PROCEDURE — 76816 OB US FOLLOW-UP PER FETUS: CPT | Performed by: OBSTETRICS & GYNECOLOGY

## 2023-06-08 PROCEDURE — 99212 OFFICE O/P EST SF 10 MIN: CPT | Performed by: OBSTETRICS & GYNECOLOGY

## 2023-06-08 RX ORDER — SODIUM CHLORIDE 9 MG/ML
20 INJECTION, SOLUTION INTRAVENOUS ONCE
Status: CANCELLED | OUTPATIENT
Start: 2023-06-08

## 2023-06-08 RX ORDER — SODIUM CHLORIDE 9 MG/ML
20 INJECTION, SOLUTION INTRAVENOUS ONCE
Status: CANCELLED | OUTPATIENT
Start: 2023-06-13

## 2023-06-08 NOTE — TELEPHONE ENCOUNTER
Treatment plan sent for sign off- 6 treatments 1x/week BE infusion- left voicemail for patient to call back 6/8

## 2023-06-08 NOTE — TELEPHONE ENCOUNTER
First fri - 6/30 11:30- spoke to patient, she will call AN infusion center with her schedule to see if there is a better date for her to start as this is pretty far out  Reset date for +/- 7 days starting Monday for patient to reschedule

## 2023-06-08 NOTE — TELEPHONE ENCOUNTER
----- Message from Salome Boyd MD sent at 6/8/2023  8:49 AM EDT -----  She has not improved with oral iron  Please contact patient and set up Venofer infusions

## 2023-06-08 NOTE — PROGRESS NOTES
The patient was seen today for an ultrasound  Please see ultrasound report (located under Ob Procedures) for additional details  Thank you very much for allowing us to participate in the care of this very nice patient  Should you have any questions, please do not hesitate to contact me  Christopher P Dixon Kayser, MD [address] [facility]  Attending Physician, Martin

## 2023-06-09 ENCOUNTER — TELEPHONE (OUTPATIENT)
Dept: OBGYN CLINIC | Facility: CLINIC | Age: 33
End: 2023-06-09

## 2023-06-09 ENCOUNTER — ROUTINE PRENATAL (OUTPATIENT)
Dept: OBGYN CLINIC | Facility: CLINIC | Age: 33
End: 2023-06-09
Payer: COMMERCIAL

## 2023-06-09 VITALS — BODY MASS INDEX: 27.83 KG/M2 | DIASTOLIC BLOOD PRESSURE: 62 MMHG | SYSTOLIC BLOOD PRESSURE: 112 MMHG | WEIGHT: 169.8 LBS

## 2023-06-09 DIAGNOSIS — Z34.83 PRENATAL CARE, SUBSEQUENT PREGNANCY, THIRD TRIMESTER: Primary | ICD-10-CM

## 2023-06-09 LAB
SL AMB  POCT GLUCOSE, UA: NORMAL
SL AMB POCT URINE PROTEIN: NORMAL

## 2023-06-09 PROCEDURE — PNV: Performed by: OBSTETRICS & GYNECOLOGY

## 2023-06-09 PROCEDURE — 81002 URINALYSIS NONAUTO W/O SCOPE: CPT | Performed by: OBSTETRICS & GYNECOLOGY

## 2023-06-09 NOTE — PROGRESS NOTES
28 wk labs completed  Following PCP and cardioogy for hx cardiac symptoms and chest tightness- started albuterol PRN   Repeat CBC completed 6/5/23- will be starting Iron infusions  Had 32 wk Growth scan yesterday  Breast pump previously ordered   Delivery consent previously signed   UTD Tdap   Birth plan returned     Denies Leaking of Fluid, vaginal bleeding, contractions  Rajinder redmond not consistant  +Fetal Movement

## 2023-06-09 NOTE — TELEPHONE ENCOUNTER
Iron order needs auth  Appt is scheduled 6/13  There needs to be a diagnosis of pregnancy on the order also as insurance will be looking for this  We also need to send the note that oral iron was done prior  Please call Gilles Vallecillo back at [phone] when Debby Reece is done

## 2023-06-09 NOTE — LETTER
June 16, 2023    [phone]    Hello, we cannot get through online, we are checking the status of authorization for Eldon Nearing id# [id number], birth date 1990, iron infusion Britney Kailyn was submitted on 6/13/23, for provider [facility]- [phone], please call our office at [phone] opt 3   Thank you ,  Denney Severin, [place]

## 2023-06-12 NOTE — PROGRESS NOTES
Good FM  Venofer infusions scheduled; plan recheck CBC prior to [facility]  Growth sono AGA, normal JOHN

## 2023-06-13 ENCOUNTER — HOSPITAL ENCOUNTER (OUTPATIENT)
Dept: INFUSION CENTER | Facility: CLINIC | Age: 33
Discharge: HOME | End: 2023-06-13
Payer: COMMERCIAL

## 2023-06-13 VITALS
DIASTOLIC BLOOD PRESSURE: 67 MMHG | OXYGEN SATURATION: 98 % | SYSTOLIC BLOOD PRESSURE: 100 MMHG | RESPIRATION RATE: 18 BRPM | TEMPERATURE: 97.1 F | HEART RATE: 85 BPM

## 2023-06-13 DIAGNOSIS — D50.9 IRON DEFICIENCY ANEMIA, UNSPECIFIED IRON DEFICIENCY ANEMIA TYPE: ICD-10-CM

## 2023-06-13 DIAGNOSIS — Z3A.32 32 WEEKS GESTATION OF PREGNANCY: Primary | ICD-10-CM

## 2023-06-13 RX ORDER — SODIUM CHLORIDE 9 MG/ML
20 INJECTION, SOLUTION INTRAVENOUS ONCE
Status: COMPLETED | OUTPATIENT
Start: 2023-06-13 | End: 2023-06-13

## 2023-06-13 RX ORDER — SODIUM CHLORIDE 9 MG/ML
20 INJECTION, SOLUTION INTRAVENOUS ONCE
Status: CANCELLED | OUTPATIENT
Start: 2023-06-23

## 2023-06-13 RX ADMIN — SODIUM CHLORIDE 20 ML/HR: 0.9 INJECTION, SOLUTION INTRAVENOUS at 08:30

## 2023-06-13 RX ADMIN — IRON SUCROSE 200 MG: 20 INJECTION, SOLUTION INTRAVENOUS at 08:45

## 2023-06-13 NOTE — PROGRESS NOTES
Patient to Austin Arriaga: Offers no complaints at present time: Lab work ( 06/05/23 ) reviewed:  Within parameters to treat: Right FA PIV initiated without incident

## 2023-06-15 NOTE — TELEPHONE ENCOUNTER
Checking on status of pre auth  [facility] let me know it is still pending so I relayed to [facility] provided the # to check on the status: [phone] opt 3 so I passed along to [facility]

## 2023-06-23 ENCOUNTER — HOSPITAL ENCOUNTER (OUTPATIENT)
Dept: INFUSION CENTER | Facility: CLINIC | Age: 33
End: 2023-06-23
Payer: COMMERCIAL

## 2023-06-23 ENCOUNTER — ROUTINE PRENATAL (OUTPATIENT)
Dept: OBGYN CLINIC | Facility: CLINIC | Age: 33
End: 2023-06-23
Payer: COMMERCIAL

## 2023-06-23 VITALS
BODY MASS INDEX: 28.25 KG/M2 | SYSTOLIC BLOOD PRESSURE: 122 MMHG | OXYGEN SATURATION: 100 % | HEART RATE: 71 BPM | WEIGHT: 172.4 LBS | DIASTOLIC BLOOD PRESSURE: 62 MMHG

## 2023-06-23 VITALS
SYSTOLIC BLOOD PRESSURE: 99 MMHG | TEMPERATURE: 97.3 F | HEART RATE: 90 BPM | OXYGEN SATURATION: 99 % | RESPIRATION RATE: 16 BRPM | DIASTOLIC BLOOD PRESSURE: 63 MMHG

## 2023-06-23 DIAGNOSIS — D50.9 IRON DEFICIENCY ANEMIA, UNSPECIFIED IRON DEFICIENCY ANEMIA TYPE: ICD-10-CM

## 2023-06-23 DIAGNOSIS — Z3A.34 34 WEEKS GESTATION OF PREGNANCY: Primary | ICD-10-CM

## 2023-06-23 DIAGNOSIS — Z3A.34 34 WEEKS GESTATION OF PREGNANCY: ICD-10-CM

## 2023-06-23 DIAGNOSIS — Z34.83 PRENATAL CARE, SUBSEQUENT PREGNANCY, THIRD TRIMESTER: Primary | ICD-10-CM

## 2023-06-23 LAB
SL AMB  POCT GLUCOSE, UA: ABNORMAL
SL AMB POCT URINE PROTEIN: ABNORMAL

## 2023-06-23 PROCEDURE — 81002 URINALYSIS NONAUTO W/O SCOPE: CPT | Performed by: OBSTETRICS & GYNECOLOGY

## 2023-06-23 PROCEDURE — PNV: Performed by: OBSTETRICS & GYNECOLOGY

## 2023-06-23 PROCEDURE — 96365 THER/PROPH/DIAG IV INF INIT: CPT

## 2023-06-23 RX ORDER — SODIUM CHLORIDE 9 MG/ML
20 INJECTION, SOLUTION INTRAVENOUS ONCE
Status: COMPLETED | OUTPATIENT
Start: 2023-06-23 | End: 2023-06-23

## 2023-06-23 RX ORDER — SODIUM CHLORIDE 9 MG/ML
20 INJECTION, SOLUTION INTRAVENOUS ONCE
Status: CANCELLED | OUTPATIENT
Start: 2023-06-27

## 2023-06-23 RX ADMIN — SODIUM CHLORIDE 20 ML/HR: 0.9 INJECTION, SOLUTION INTRAVENOUS at 10:45

## 2023-06-23 RX ADMIN — SODIUM CHLORIDE 200 MG: 9 INJECTION, SOLUTION INTRAVENOUS at 11:00

## 2023-06-23 NOTE — ASSESSMENT & PLAN NOTE
Uday Kilgore is doing well  Baby is active  Denies bleeding, LOF    + BH contractions, nothing concerning  TWG 35#  Growth 32wks - 86%, she thinks this baby feels about same size as others   is planning vasectomy  Is leaning toward 39wk IOL

## 2023-06-23 NOTE — PROGRESS NOTES
Patient to [place] for Venofer: Offers no complaints at present time: Lab work ( 06/05/23 ) reviewed:  Within parameters to treat: Left AC PIV initiated without incident

## 2023-06-23 NOTE — PROGRESS NOTES
Prenatal visit   GA 34w6d  Denies any bleeding, leakage of fluid or cramping   NL fetal movement  28 wk labs completed   Delivery consent signed   S/p Tdap vaccine

## 2023-06-27 ENCOUNTER — HOSPITAL ENCOUNTER (OUTPATIENT)
Dept: INFUSION CENTER | Facility: CLINIC | Age: 33
Discharge: HOME | End: 2023-06-27
Payer: COMMERCIAL

## 2023-06-27 DIAGNOSIS — Z3A.34 34 WEEKS GESTATION OF PREGNANCY: Primary | ICD-10-CM

## 2023-06-27 DIAGNOSIS — D50.9 IRON DEFICIENCY ANEMIA, UNSPECIFIED IRON DEFICIENCY ANEMIA TYPE: ICD-10-CM

## 2023-06-27 RX ORDER — SODIUM CHLORIDE 9 MG/ML
20 INJECTION, SOLUTION INTRAVENOUS ONCE
Status: COMPLETED | OUTPATIENT
Start: 2023-06-27 | End: 2023-06-27

## 2023-06-27 RX ORDER — SODIUM CHLORIDE 9 MG/ML
20 INJECTION, SOLUTION INTRAVENOUS ONCE
Status: CANCELLED | OUTPATIENT
Start: 2023-07-05

## 2023-06-27 RX ADMIN — SODIUM CHLORIDE 200 MG: 9 INJECTION, SOLUTION INTRAVENOUS at 14:49

## 2023-06-27 RX ADMIN — SODIUM CHLORIDE 20 ML/HR: 0.9 INJECTION, SOLUTION INTRAVENOUS at 14:48

## 2023-06-27 NOTE — PROGRESS NOTES
Patient to infusion for Venofer  She offers no complaints  She tolerated the treatment today without incident    She is aware of next appointment, declined AVS

## 2023-07-04 NOTE — PROGRESS NOTES
Prenatal Visit   36w4d    PN1 completed  Nuchal completed   AFP completed  Level 2 completed  28 week labs completed   Consents signed  Breast pump ordered   Tdap UTD  GBS done today   Cervix check    Denies LOF, VB, or CTX.   Has cramping  Pt has +FM  Patients urine is   No questions/concerns today

## 2023-07-05 ENCOUNTER — ROUTINE PRENATAL (OUTPATIENT)
Dept: OBGYN CLINIC | Facility: CLINIC | Age: 33
End: 2023-07-05
Payer: COMMERCIAL

## 2023-07-05 ENCOUNTER — HOSPITAL ENCOUNTER (OUTPATIENT)
Dept: INFUSION CENTER | Facility: CLINIC | Age: 33
Discharge: HOME | End: 2023-07-05
Payer: COMMERCIAL

## 2023-07-05 VITALS
DIASTOLIC BLOOD PRESSURE: 65 MMHG | OXYGEN SATURATION: 98 % | SYSTOLIC BLOOD PRESSURE: 114 MMHG | RESPIRATION RATE: 18 BRPM | TEMPERATURE: 97.8 F | HEART RATE: 68 BPM

## 2023-07-05 VITALS — DIASTOLIC BLOOD PRESSURE: 60 MMHG | SYSTOLIC BLOOD PRESSURE: 116 MMHG

## 2023-07-05 DIAGNOSIS — O09.523 MULTIGRAVIDA OF ADVANCED MATERNAL AGE IN THIRD TRIMESTER: ICD-10-CM

## 2023-07-05 DIAGNOSIS — D50.9 IRON DEFICIENCY ANEMIA, UNSPECIFIED IRON DEFICIENCY ANEMIA TYPE: ICD-10-CM

## 2023-07-05 DIAGNOSIS — Z3A.36 36 WEEKS GESTATION OF PREGNANCY: Primary | ICD-10-CM

## 2023-07-05 DIAGNOSIS — Z3A.36 36 WEEKS GESTATION OF PREGNANCY: ICD-10-CM

## 2023-07-05 DIAGNOSIS — Z34.83 PRENATAL CARE, SUBSEQUENT PREGNANCY, THIRD TRIMESTER: Primary | ICD-10-CM

## 2023-07-05 LAB
SL AMB  POCT GLUCOSE, UA: NORMAL
SL AMB POCT URINE PROTEIN: NORMAL

## 2023-07-05 PROCEDURE — 81002 URINALYSIS NONAUTO W/O SCOPE: CPT | Performed by: PHYSICIAN ASSISTANT

## 2023-07-05 PROCEDURE — 96365 THER/PROPH/DIAG IV INF INIT: CPT

## 2023-07-05 PROCEDURE — 87150 DNA/RNA AMPLIFIED PROBE: CPT | Performed by: PHYSICIAN ASSISTANT

## 2023-07-05 PROCEDURE — PNV: Performed by: PHYSICIAN ASSISTANT

## 2023-07-05 RX ORDER — SODIUM CHLORIDE 9 MG/ML
20 INJECTION, SOLUTION INTRAVENOUS ONCE
Status: COMPLETED | OUTPATIENT
Start: 2023-07-05 | End: 2023-07-05

## 2023-07-05 RX ORDER — SODIUM CHLORIDE 9 MG/ML
20 INJECTION, SOLUTION INTRAVENOUS ONCE
Status: CANCELLED | OUTPATIENT
Start: 2023-07-13

## 2023-07-05 RX ADMIN — SODIUM CHLORIDE 20 ML/HR: 0.9 INJECTION, SOLUTION INTRAVENOUS at 09:21

## 2023-07-05 RX ADMIN — SODIUM CHLORIDE 200 MG: 9 INJECTION, SOLUTION INTRAVENOUS at 09:23

## 2023-07-05 NOTE — ASSESSMENT & PLAN NOTE
Itzel Padilla  is a 27 y.o. [id number] @36w4d who presents for routine prenatal visit. Discussed ARRIVE trial and option for eIOL at 39 wks. She is possibly interested in eIOL and will start considering dates. Cervix 2/30/-5 today    Growth scan- EFW86% at 32 wks; follow up as clinically indicated  GBS collected today    Reports good fetal movement. Denies LOF, vaginal bleeding, regular uterine contractions, cramping, headaches or visual changes. Reviewed PTL/Labor precautions and FKC.

## 2023-07-05 NOTE — PROGRESS NOTES
36 weeks gestation of pregnancy  Nano Storm  is a 27 y.o. [id number] @36w4d who presents for routine prenatal visit. Discussed ARRIVE trial and option for eIOL at 39 wks. She is possibly interested in eIOL and will start considering dates. Cervix 2/30/-5 today    Growth scan- EFW86% at 32 wks; follow up as clinically indicated  GBS collected today    Reports good fetal movement. Denies LOF, vaginal bleeding, regular uterine contractions, cramping, headaches or visual changes. Reviewed PTL/Labor precautions and FKC.           Multigravida of advanced maternal age in third trimester   EFW86% at 32 wks; follow up as clinically indicated

## 2023-07-07 LAB — GP B STREP DNA SPEC QL NAA+PROBE: NEGATIVE

## 2023-07-12 ENCOUNTER — ROUTINE PRENATAL (OUTPATIENT)
Dept: OBGYN CLINIC | Facility: CLINIC | Age: 33
End: 2023-07-12
Payer: COMMERCIAL

## 2023-07-12 VITALS — SYSTOLIC BLOOD PRESSURE: 110 MMHG | DIASTOLIC BLOOD PRESSURE: 64 MMHG | BODY MASS INDEX: 27.04 KG/M2 | WEIGHT: 165 LBS

## 2023-07-12 DIAGNOSIS — Z3A.37 37 WEEKS GESTATION OF PREGNANCY: Primary | ICD-10-CM

## 2023-07-12 DIAGNOSIS — D50.9 MATERNAL IRON DEFICIENCY ANEMIA AFFECTING PREGNANCY, ANTEPARTUM: ICD-10-CM

## 2023-07-12 DIAGNOSIS — J45.20 MILD INTERMITTENT ASTHMA WITHOUT COMPLICATION: ICD-10-CM

## 2023-07-12 DIAGNOSIS — O99.019 MATERNAL IRON DEFICIENCY ANEMIA AFFECTING PREGNANCY, ANTEPARTUM: ICD-10-CM

## 2023-07-12 DIAGNOSIS — D50.9 IRON DEFICIENCY ANEMIA OF PREGNANCY: ICD-10-CM

## 2023-07-12 DIAGNOSIS — O99.019 IRON DEFICIENCY ANEMIA OF PREGNANCY: ICD-10-CM

## 2023-07-12 LAB
SL AMB  POCT GLUCOSE, UA: NORMAL
SL AMB POCT URINE PROTEIN: NORMAL

## 2023-07-12 PROCEDURE — 81002 URINALYSIS NONAUTO W/O SCOPE: CPT | Performed by: PHYSICIAN ASSISTANT

## 2023-07-12 PROCEDURE — PNV: Performed by: PHYSICIAN ASSISTANT

## 2023-07-12 NOTE — ASSESSMENT & PLAN NOTE
Has used daily over the past week which she believes to be related to allergies/humidity  Advised to call if requiring albuterol multiple times daily without relief

## 2023-07-12 NOTE — ASSESSMENT & PLAN NOTE
Tiffany Calon  is a 26 y.o. [id number] @37w4d who presents for routine prenatal visit. Discussed option for eIOL at 39 wks. Pt would prefer to wait for natural labor at this time. Will consider scheduling post date IOL if no labor by 39 wks    GBS negative   Has yellow packet. Reports good fetal movement. Denies LOF, vaginal bleeding, regular uterine contractions, cramping, headaches or visual changes. Reviewed PTL/Labor precautions and FKC.

## 2023-07-12 NOTE — PROGRESS NOTES
Prenatal Visit   37w4d    PN1 completed  Nuchal completed   AFP completed  Level 2 completed  28 week labs completed   Consents signed  Breast pump received   Tdap UTD  GBS neg  Cervix check today     Denies LOF, VB, or CTX.   Has BH  Pt has +FM  Patients urine is trace/neg  Patient has no questions/concerns today

## 2023-07-12 NOTE — ASSESSMENT & PLAN NOTE
Receiving last two iron infusions this week  Will plan for repeat CBC and anemia reflex once infusions are completed

## 2023-07-12 NOTE — PROGRESS NOTES
37 weeks gestation of pregnancy  Veronica Jaramillo  is a 26 y.o. [id number] @37w4d who presents for routine prenatal visit. Discussed option for eIOL at 39 wks. Pt would prefer to wait for natural labor at this time. Will consider scheduling post date IOL if no labor by 39 wks    GBS negative   Has yellow packet. Reports good fetal movement. Denies LOF, vaginal bleeding, regular uterine contractions, cramping, headaches or visual changes. Reviewed PTL/Labor precautions and FKC.           Iron deficiency anemia of pregnancy  Receiving last two iron infusions this week  Will plan for repeat CBC and anemia reflex once infusions are completed    Asthma  Has used daily over the past week which she believes to be related to allergies/humidity  Advised to call if requiring albuterol multiple times daily without relief

## 2023-07-13 ENCOUNTER — HOSPITAL ENCOUNTER (OUTPATIENT)
Dept: INFUSION CENTER | Facility: CLINIC | Age: 33
Discharge: HOME | End: 2023-07-13
Payer: COMMERCIAL

## 2023-07-13 VITALS
DIASTOLIC BLOOD PRESSURE: 65 MMHG | HEART RATE: 69 BPM | SYSTOLIC BLOOD PRESSURE: 102 MMHG | TEMPERATURE: 97.6 F | OXYGEN SATURATION: 98 % | RESPIRATION RATE: 18 BRPM

## 2023-07-13 DIAGNOSIS — Z3A.37 37 WEEKS GESTATION OF PREGNANCY: ICD-10-CM

## 2023-07-13 DIAGNOSIS — D50.9 IRON DEFICIENCY ANEMIA OF PREGNANCY: Primary | ICD-10-CM

## 2023-07-13 DIAGNOSIS — O99.019 IRON DEFICIENCY ANEMIA OF PREGNANCY: Primary | ICD-10-CM

## 2023-07-13 PROCEDURE — 96365 THER/PROPH/DIAG IV INF INIT: CPT

## 2023-07-13 RX ORDER — SODIUM CHLORIDE 9 MG/ML
20 INJECTION, SOLUTION INTRAVENOUS ONCE
Status: COMPLETED | OUTPATIENT
Start: 2023-07-13 | End: 2023-07-13

## 2023-07-13 RX ORDER — SODIUM CHLORIDE 9 MG/ML
20 INJECTION, SOLUTION INTRAVENOUS ONCE
Status: CANCELLED | OUTPATIENT
Start: 2023-07-18

## 2023-07-13 RX ADMIN — SODIUM CHLORIDE 20 ML/HR: 0.9 INJECTION, SOLUTION INTRAVENOUS at 08:32

## 2023-07-13 RX ADMIN — SODIUM CHLORIDE 200 MG: 9 INJECTION, SOLUTION INTRAVENOUS at 08:31

## 2023-07-13 NOTE — PROGRESS NOTES
Patient tolerated venofer infusion without incident. Peripheral IV removed without incident. Next appointment confirmed. AVS declined.

## 2023-07-13 NOTE — PROGRESS NOTES
Patient presents today for treatment with Venofer. Patient offers no complaints. VSS. Peripheral IV inserted without incident.

## 2023-07-17 ENCOUNTER — OFFICE VISIT (OUTPATIENT)
Dept: INTERNAL MEDICINE CLINIC | Facility: CLINIC | Age: 33
End: 2023-07-17
Payer: COMMERCIAL

## 2023-07-17 VITALS
WEIGHT: 175.8 LBS | OXYGEN SATURATION: 99 % | HEIGHT: 66 IN | DIASTOLIC BLOOD PRESSURE: 70 MMHG | BODY MASS INDEX: 28.25 KG/M2 | SYSTOLIC BLOOD PRESSURE: 116 MMHG | HEART RATE: 74 BPM

## 2023-07-17 DIAGNOSIS — J02.9 SORE THROAT: Primary | ICD-10-CM

## 2023-07-17 LAB — S PYO AG THROAT QL: NEGATIVE

## 2023-07-17 PROCEDURE — 87880 STREP A ASSAY W/OPTIC: CPT | Performed by: GENERAL ACUTE CARE HOSPITAL

## 2023-07-17 PROCEDURE — 87070 CULTURE OTHR SPECIMN AEROBIC: CPT | Performed by: GENERAL ACUTE CARE HOSPITAL

## 2023-07-17 PROCEDURE — 99213 OFFICE O/P EST LOW 20 MIN: CPT | Performed by: GENERAL ACUTE CARE HOSPITAL

## 2023-07-17 NOTE — PROGRESS NOTES
Name: Chika Hernadez      : 1990      MRN: [id number]  Encounter Provider: Carrol Bryant MD  Encounter Date: 2023   [facility]    Assessment & Plan     1.  Sore throat  Assessment & Plan:  Rapid strep is negative  Will send for throat culture    Orders:  -     POCT rapid strepA  -     Throat culture         Subjective      HPI   Sore throat  Started over the weekend  Kids were diagnosed with strep throat  Review of Systems    Current Outpatient Medications on File Prior to Visit   Medication Sig   • albuterol (PROVENTIL HFA,VENTOLIN HFA) 90 mcg/act inhaler Inhale 2 puffs every 6 (six) hours as needed for wheezing   • Prenatal Vit-Fe Fumarate-FA (PRENATAL VITAMINS PO) Take by mouth       Objective     /70 (BP Location: Left arm, Patient Position: Sitting, Cuff Size: Standard)   Pulse 74   Ht 5' 5.5" (1.664 m)   Wt 79.7 kg (175 lb 12.8 oz)   LMP 10/22/2022 (Exact Date)   SpO2 99%   BMI 28.81 kg/m²     Physical Exam  Carrol Bryant MD

## 2023-07-18 ENCOUNTER — APPOINTMENT (OUTPATIENT)
Dept: LAB | Facility: CLINIC | Age: 33
End: 2023-07-18
Payer: COMMERCIAL

## 2023-07-18 ENCOUNTER — HOSPITAL ENCOUNTER (OUTPATIENT)
Dept: INFUSION CENTER | Facility: CLINIC | Age: 33
Discharge: HOME | End: 2023-07-18
Payer: COMMERCIAL

## 2023-07-18 VITALS
HEART RATE: 76 BPM | RESPIRATION RATE: 18 BRPM | SYSTOLIC BLOOD PRESSURE: 106 MMHG | OXYGEN SATURATION: 98 % | DIASTOLIC BLOOD PRESSURE: 67 MMHG | TEMPERATURE: 96.5 F

## 2023-07-18 DIAGNOSIS — O99.019 MATERNAL IRON DEFICIENCY ANEMIA AFFECTING PREGNANCY, ANTEPARTUM: ICD-10-CM

## 2023-07-18 DIAGNOSIS — D50.9 MATERNAL IRON DEFICIENCY ANEMIA AFFECTING PREGNANCY, ANTEPARTUM: ICD-10-CM

## 2023-07-18 DIAGNOSIS — O99.019 IRON DEFICIENCY ANEMIA OF PREGNANCY: Primary | ICD-10-CM

## 2023-07-18 DIAGNOSIS — D50.9 IRON DEFICIENCY ANEMIA OF PREGNANCY: Primary | ICD-10-CM

## 2023-07-18 DIAGNOSIS — Z3A.37 37 WEEKS GESTATION OF PREGNANCY: ICD-10-CM

## 2023-07-18 DIAGNOSIS — Z00.8 HEALTH EXAMINATION IN POPULATION SURVEY: ICD-10-CM

## 2023-07-18 LAB
BASOPHILS # BLD AUTO: 0.04 THOUSANDS/ÂΜL (ref 0–0.1)
BASOPHILS NFR BLD AUTO: 1 % (ref 0–1)
CHOLEST SERPL-MCNC: 366 MG/DL
EOSINOPHIL # BLD AUTO: 0.14 THOUSAND/ÂΜL (ref 0–0.61)
EOSINOPHIL NFR BLD AUTO: 2 % (ref 0–6)
ERYTHROCYTE [DISTWIDTH] IN BLOOD BY AUTOMATED COUNT: 19.1 % (ref 11.6–15.1)
EST. AVERAGE GLUCOSE BLD GHB EST-MCNC: 94 MG/DL
HBA1C MFR BLD: 4.9 %
HCT VFR BLD AUTO: 36.9 % (ref 34.8–46.1)
HDLC SERPL-MCNC: 57 MG/DL
HGB BLD-MCNC: 11.8 G/DL (ref 11.5–15.4)
IMM GRANULOCYTES # BLD AUTO: 0.12 THOUSAND/UL (ref 0–0.2)
IMM GRANULOCYTES NFR BLD AUTO: 2 % (ref 0–2)
LDLC SERPL CALC-MCNC: 270 MG/DL (ref 0–100)
LYMPHOCYTES # BLD AUTO: 2.19 THOUSANDS/ÂΜL (ref 0.6–4.47)
LYMPHOCYTES NFR BLD AUTO: 28 % (ref 14–44)
MCH RBC QN AUTO: 29 PG (ref 26.8–34.3)
MCHC RBC AUTO-ENTMCNC: 32 G/DL (ref 31.4–37.4)
MCV RBC AUTO: 91 FL (ref 82–98)
MONOCYTES # BLD AUTO: 0.61 THOUSAND/ÂΜL (ref 0.17–1.22)
MONOCYTES NFR BLD AUTO: 8 % (ref 4–12)
NEUTROPHILS # BLD AUTO: 4.64 THOUSANDS/ÂΜL (ref 1.85–7.62)
NEUTS SEG NFR BLD AUTO: 59 % (ref 43–75)
NONHDLC SERPL-MCNC: 309 MG/DL
NRBC BLD AUTO-RTO: 0 /100 WBCS
PLATELET # BLD AUTO: 185 THOUSANDS/UL (ref 149–390)
PMV BLD AUTO: 10.3 FL (ref 8.9–12.7)
RBC # BLD AUTO: 4.07 MILLION/UL (ref 3.81–5.12)
TRIGL SERPL-MCNC: 193 MG/DL
WBC # BLD AUTO: 7.74 THOUSAND/UL (ref 4.31–10.16)

## 2023-07-18 PROCEDURE — 83036 HEMOGLOBIN GLYCOSYLATED A1C: CPT

## 2023-07-18 PROCEDURE — 85025 COMPLETE CBC W/AUTO DIFF WBC: CPT

## 2023-07-18 PROCEDURE — 36415 COLL VENOUS BLD VENIPUNCTURE: CPT

## 2023-07-18 PROCEDURE — 80061 LIPID PANEL: CPT

## 2023-07-18 RX ORDER — SODIUM CHLORIDE 9 MG/ML
20 INJECTION, SOLUTION INTRAVENOUS ONCE
Status: CANCELLED | OUTPATIENT
Start: 2023-07-20

## 2023-07-18 RX ORDER — SODIUM CHLORIDE 9 MG/ML
20 INJECTION, SOLUTION INTRAVENOUS ONCE
Status: COMPLETED | OUTPATIENT
Start: 2023-07-18 | End: 2023-07-18

## 2023-07-18 RX ADMIN — SODIUM CHLORIDE 200 MG: 9 INJECTION, SOLUTION INTRAVENOUS at 08:44

## 2023-07-18 RX ADMIN — SODIUM CHLORIDE 20 ML/HR: 0.9 INJECTION, SOLUTION INTRAVENOUS at 08:43

## 2023-07-18 NOTE — PROGRESS NOTES
Patient tolerated treatment today without complications. Patient has completed all ordered doses of venofer. Aware of follow up appointments and declined print out.

## 2023-07-19 LAB — BACTERIA THROAT CULT: NORMAL

## 2023-07-20 NOTE — PROGRESS NOTES
Prenatal Visit   38w6d    PN1 completed  Nuchal completed   AFP completed  Level 2 completed  28 week labs completed   Consents signed  Breast pump ordered   Tdap UTD  GBS neg  Cervix check today  Induction consent     Denies LOF, VB, or CTX.   Some cramping   Pt has +FM  Patients urine is neg/neg   Patient has no questions/concerns today

## 2023-07-21 ENCOUNTER — ROUTINE PRENATAL (OUTPATIENT)
Dept: OBGYN CLINIC | Facility: CLINIC | Age: 33
End: 2023-07-21
Payer: COMMERCIAL

## 2023-07-21 ENCOUNTER — TELEPHONE (OUTPATIENT)
Dept: OBGYN CLINIC | Facility: CLINIC | Age: 33
End: 2023-07-21

## 2023-07-21 VITALS — SYSTOLIC BLOOD PRESSURE: 108 MMHG | WEIGHT: 175 LBS | DIASTOLIC BLOOD PRESSURE: 68 MMHG | BODY MASS INDEX: 28.68 KG/M2

## 2023-07-21 DIAGNOSIS — Z34.83 PRENATAL CARE, SUBSEQUENT PREGNANCY, THIRD TRIMESTER: Primary | ICD-10-CM

## 2023-07-21 DIAGNOSIS — O99.019 IRON DEFICIENCY ANEMIA OF PREGNANCY: ICD-10-CM

## 2023-07-21 DIAGNOSIS — Z3A.38 38 WEEKS GESTATION OF PREGNANCY: ICD-10-CM

## 2023-07-21 DIAGNOSIS — D50.9 IRON DEFICIENCY ANEMIA OF PREGNANCY: ICD-10-CM

## 2023-07-21 DIAGNOSIS — J45.20 MILD INTERMITTENT ASTHMA WITHOUT COMPLICATION: ICD-10-CM

## 2023-07-21 LAB
SL AMB  POCT GLUCOSE, UA: NORMAL
SL AMB POCT URINE PROTEIN: NORMAL

## 2023-07-21 PROCEDURE — PNV: Performed by: PHYSICIAN ASSISTANT

## 2023-07-21 PROCEDURE — 81002 URINALYSIS NONAUTO W/O SCOPE: CPT | Performed by: PHYSICIAN ASSISTANT

## 2023-07-21 NOTE — PROGRESS NOTES
38 weeks gestation of pregnancy  Veronica Jaramillo  is a 26 y.o. [id number] @38w6d who presents for routine prenatal visit. Interested in getting post date IOL scheduled. Prefers 7/31/23. Request submitted. Induction consents were signed. Cervix 2/50/-4 today. Will plan without ripening    GBS negative   Has yellow packet.     Reports good fetal movement.   Denies LOF, vaginal bleeding, regular uterine contractions, cramping, headaches or visual changes.       Reviewed PTL/Labor precautions and C        Iron deficiency anemia of pregnancy  S/p iron infusions   Hgb improved to 11.8    Asthma  Has not needed albuterol daily this week  Still using albuterol a couple of times a week

## 2023-07-21 NOTE — ASSESSMENT & PLAN NOTE
Shelley Spivey  is a 26 y.o. [id number] @38w6d who presents for routine prenatal visit. Interested in getting post date IOL scheduled. Prefers 7/31/23. Request submitted. Induction consents were signed. Cervix 2/50/-4 today. Will plan without ripening    GBS negative   Has yellow packet.     Reports good fetal movement.   Denies LOF, vaginal bleeding, regular uterine contractions, cramping, headaches or visual changes.       Reviewed PTL/Labor precautions and [phone]

## 2023-07-21 NOTE — TELEPHONE ENCOUNTER
----- Message from Sandi Wills PA-C sent at 7/21/2023  8:27 AM EDT -----  Regarding: IOL request  Procedure to be scheduled (IOL or CS): post date IOL  NIKOS: Estimated Date of Delivery: 7/29/23  Indication for delivery: post date IOL  Requested date (s) of delivery: 7/31/23    If requested date is unavailable, is there a date by which the pt must be delivered?  41 wks  Physician preference: n/a    If IOL, anticipated method: without ripening  If CS, with or without tubal: n/a

## 2023-07-22 ENCOUNTER — NURSE TRIAGE (OUTPATIENT)
Dept: OTHER | Facility: OTHER | Age: 33
End: 2023-07-22

## 2023-07-22 ENCOUNTER — HOSPITAL ENCOUNTER (INPATIENT)
Facility: HOSPITAL | Age: 33
LOS: 2 days | Discharge: HOME | End: 2023-07-24
Attending: OBSTETRICS & GYNECOLOGY | Admitting: OBSTETRICS & GYNECOLOGY
Payer: COMMERCIAL

## 2023-07-22 LAB
ERYTHROCYTE [DISTWIDTH] IN BLOOD BY AUTOMATED COUNT: 18.8 % (ref 11.6–15.1)
HCT VFR BLD AUTO: 41.4 % (ref 34.8–46.1)
HGB BLD-MCNC: 13.5 G/DL (ref 11.5–15.4)
MCH RBC QN AUTO: 29 PG (ref 26.8–34.3)
MCHC RBC AUTO-ENTMCNC: 32.6 G/DL (ref 31.4–37.4)
MCV RBC AUTO: 89 FL (ref 82–98)
PLATELET # BLD AUTO: 185 THOUSANDS/UL (ref 149–390)
PMV BLD AUTO: 10 FL (ref 8.9–12.7)
RBC # BLD AUTO: 4.65 MILLION/UL (ref 3.81–5.12)
WBC # BLD AUTO: 8.58 THOUSAND/UL (ref 4.31–10.16)

## 2023-07-22 PROCEDURE — 86780 TREPONEMA PALLIDUM: CPT | Performed by: OBSTETRICS & GYNECOLOGY

## 2023-07-22 PROCEDURE — 86900 BLOOD TYPING SEROLOGIC ABO: CPT | Performed by: OBSTETRICS & GYNECOLOGY

## 2023-07-22 PROCEDURE — 86850 RBC ANTIBODY SCREEN: CPT | Performed by: OBSTETRICS & GYNECOLOGY

## 2023-07-22 PROCEDURE — 87806 HIV AG W/HIV1&2 ANTB W/OPTIC: CPT

## 2023-07-22 PROCEDURE — 86901 BLOOD TYPING SEROLOGIC RH(D): CPT | Performed by: OBSTETRICS & GYNECOLOGY

## 2023-07-22 PROCEDURE — NC001 PR NO CHARGE: Performed by: OBSTETRICS & GYNECOLOGY

## 2023-07-22 PROCEDURE — 4A1HXCZ MONITORING OF PRODUCTS OF CONCEPTION, CARDIAC RATE, EXTERNAL APPROACH: ICD-10-PCS | Performed by: OBSTETRICS & GYNECOLOGY

## 2023-07-22 PROCEDURE — 85027 COMPLETE CBC AUTOMATED: CPT | Performed by: OBSTETRICS & GYNECOLOGY

## 2023-07-22 RX ORDER — LIDOCAINE HYDROCHLORIDE 10 MG/ML
INJECTION, SOLUTION EPIDURAL; INFILTRATION; INTRACAUDAL; PERINEURAL
Status: DISCONTINUED
Start: 2023-07-22 | End: 2023-07-23 | Stop reason: WASHOUT

## 2023-07-22 NOTE — TELEPHONE ENCOUNTER
Reason for Disposition  • [1] Pregnant 37 or more weeks (term) AND [2] pinkish or brownish mucous discharge    Answer Assessment - Initial Assessment Questions  1. ONSET: "When did this bleeding start?"         Today since 0830    2. DESCRIPTION: "Describe the bleeding that you are having." "How much bleeding is there?"     - SPOTTING: spotting, or pinkish / brownish mucous discharge; does not fill panti-liner or pad     - MILD:  less than 1 pad / hour; less than patient's usual menstrual bleeding    - MODERATE: 1-2 pads / hour; 1 menstrual cup every 6 hours; small-medium blood clots (e.g., pea, grape, small coin)    - SEVERE: soaking 2 or more pads/hour for 2 or more hours; 1 menstrual cup every 2 hours; bleeding not contained by pads or continuous red blood from vagina; large blood clots (e.g., golf ball, large coin)       Brown colored spotting in underwear- seemed a bit heavier earlier today but now has slowed down a bit. 3. ABDOMINAL PAIN SEVERITY: If present, ask: "How bad is it?"  (e.g., Scale 1-10; mild, moderate, or severe)    - MILD (1-3): doesn't interfere with normal activities, abdomen soft and not tender to touch     - MODERATE (4-7): interferes with normal activities or awakens from sleep, tender to touch     - SEVERE (8-10): excruciating pain, doubled over, unable to do any normal activities      No regular pain     4. PREGNANCY: "Do you know how many weeks or months pregnant you are?"       39 wks    5. NIKOS: "What date are you expecting to deliver?"      7/29    6. FETAL MOVEMENT: "Has the baby's movement decreased or changed significantly from normal?"      Normal FM    7.  HEMODYNAMIC STATUS: "Are you weak or feeling lightheaded?" If Yes, ask: "Can you stand and walk normally?"       NORMAL    8. OTHER SYMPTOMS: "What other symptoms are you having with the bleeding?" (e.g., leaking fluid from vagina, contractions)      No    Protocols used: PREGNANCY - VAGINAL BLEEDING GREATER THAN 20 WEEKS EGA-ADULT-      Pts 5th pregnancy. Reached out to on call to see if ok to continue to monitor at home. Per Dr. Aneesh Cosme- pt can continue to monitor. Cervix if most likely starting to ripen. Call back if having contractions or rupture of membranes. Pt verbalized understanding.

## 2023-07-22 NOTE — TELEPHONE ENCOUNTER
Regardin weeks preg. / bloody discharge  ----- Message from Kaila Cheek sent at 2023 12:28 PM EDT -----  "I am 39 weeks pregnant and I have a bloody discharge"

## 2023-07-23 LAB
ABO GROUP BLD: NORMAL
BASE EXCESS BLDCOA CALC-SCNC: -0.7 MMOL/L (ref 3–11)
BASE EXCESS BLDCOV CALC-SCNC: -1.8 MMOL/L (ref 1–9)
BLD GP AB SCN SERPL QL: NEGATIVE
HCO3 BLDCOA-SCNC: 25.9 MMOL/L (ref 17.3–27.3)
HCO3 BLDCOV-SCNC: 20.6 MMOL/L (ref 12.2–28.6)
HIV 1+2 AB+HIV1 P24 AG SERPL QL IA: NORMAL
HIV1 P24 AG SER QL: NORMAL
HOLD SPECIMEN: NORMAL
O2 CT VFR BLDCOA CALC: 5.3 ML/DL
OXYHGB MFR BLDCOA: 24.4 %
OXYHGB MFR BLDCOV: 85.2 %
PCO2 BLDCOA: 49.6 MM[HG] (ref 30–60)
PCO2 BLDCOV: 29.9 MM HG (ref 27–43)
PH BLDCOA: 7.33 [PH] (ref 7.23–7.43)
PH BLDCOV: 7.46 [PH] (ref 7.19–7.49)
PO2 BLDCOA: 14.1 MM HG (ref 5–25)
PO2 BLDCOV: 41.4 MM HG (ref 15–45)
RH BLD: POSITIVE
SAO2 % BLDCOV: 19.6 ML/DL
SPECIMEN EXPIRATION DATE: NORMAL
TREPONEMA PALLIDUM IGG+IGM AB [PRESENCE] IN SERUM OR PLASMA BY IMMUNOASSAY: NORMAL

## 2023-07-23 PROCEDURE — 82805 BLOOD GASES W/O2 SATURATION: CPT

## 2023-07-23 PROCEDURE — 59400 OBSTETRICAL CARE: CPT | Performed by: OBSTETRICS & GYNECOLOGY

## 2023-07-23 PROCEDURE — NC001 PR NO CHARGE: Performed by: OBSTETRICS & GYNECOLOGY

## 2023-07-23 RX ORDER — SIMETHICONE 80 MG
80 TABLET,CHEWABLE ORAL 4 TIMES DAILY PRN
Status: DISCONTINUED | OUTPATIENT
Start: 2023-07-23 | End: 2023-07-24 | Stop reason: HOSPADM

## 2023-07-23 RX ORDER — DIPHENHYDRAMINE HCL 25 MG
25 TABLET ORAL EVERY 6 HOURS PRN
Status: DISCONTINUED | OUTPATIENT
Start: 2023-07-23 | End: 2023-07-24 | Stop reason: HOSPADM

## 2023-07-23 RX ORDER — OXYTOCIN/RINGER'S LACTATE 30/500 ML
250 PLASTIC BAG, INJECTION (ML) INTRAVENOUS ONCE
Status: COMPLETED | OUTPATIENT
Start: 2023-07-23 | End: 2023-07-23

## 2023-07-23 RX ORDER — KETOROLAC TROMETHAMINE 30 MG/ML
30 INJECTION, SOLUTION INTRAMUSCULAR; INTRAVENOUS EVERY 6 HOURS SCHEDULED
Status: DISCONTINUED | OUTPATIENT
Start: 2023-07-23 | End: 2023-07-23

## 2023-07-23 RX ORDER — ALBUTEROL SULFATE 90 UG/1
2 AEROSOL, METERED RESPIRATORY (INHALATION) EVERY 6 HOURS PRN
Status: DISCONTINUED | OUTPATIENT
Start: 2023-07-23 | End: 2023-07-24 | Stop reason: HOSPADM

## 2023-07-23 RX ORDER — ACETAMINOPHEN 325 MG/1
650 TABLET ORAL EVERY 4 HOURS PRN
Status: DISCONTINUED | OUTPATIENT
Start: 2023-07-23 | End: 2023-07-24 | Stop reason: HOSPADM

## 2023-07-23 RX ORDER — CALCIUM CARBONATE 500 MG/1
1000 TABLET, CHEWABLE ORAL DAILY PRN
Status: DISCONTINUED | OUTPATIENT
Start: 2023-07-23 | End: 2023-07-24 | Stop reason: HOSPADM

## 2023-07-23 RX ORDER — DIAPER,BRIEF,INFANT-TODD,DISP
1 EACH MISCELLANEOUS DAILY PRN
Status: DISCONTINUED | OUTPATIENT
Start: 2023-07-23 | End: 2023-07-24 | Stop reason: HOSPADM

## 2023-07-23 RX ORDER — IBUPROFEN 600 MG/1
600 TABLET ORAL EVERY 6 HOURS PRN
Status: DISCONTINUED | OUTPATIENT
Start: 2023-07-23 | End: 2023-07-23

## 2023-07-23 RX ORDER — DOCUSATE SODIUM 100 MG/1
100 CAPSULE, LIQUID FILLED ORAL 2 TIMES DAILY
Status: DISCONTINUED | OUTPATIENT
Start: 2023-07-23 | End: 2023-07-24 | Stop reason: HOSPADM

## 2023-07-23 RX ORDER — ONDANSETRON 2 MG/ML
4 INJECTION INTRAMUSCULAR; INTRAVENOUS EVERY 8 HOURS PRN
Status: DISCONTINUED | OUTPATIENT
Start: 2023-07-23 | End: 2023-07-24 | Stop reason: HOSPADM

## 2023-07-23 RX ORDER — IBUPROFEN 600 MG/1
600 TABLET ORAL EVERY 6 HOURS PRN
Status: DISCONTINUED | OUTPATIENT
Start: 2023-07-23 | End: 2023-07-24 | Stop reason: HOSPADM

## 2023-07-23 RX ADMIN — OXYTOCIN 250 MILLI-UNITS/MIN: 10 INJECTION INTRAVENOUS at 00:04

## 2023-07-23 RX ADMIN — Medication 1 TABLET: at 09:33

## 2023-07-23 RX ADMIN — HYDROCORTISONE 1 APPLICATION: 1 CREAM TOPICAL at 02:02

## 2023-07-23 RX ADMIN — WITCH HAZEL 1 PAD: 500 SOLUTION RECTAL; TOPICAL at 02:02

## 2023-07-23 RX ADMIN — BENZOCAINE AND LEVOMENTHOL 1 APPLICATION: 200; 5 SPRAY TOPICAL at 02:02

## 2023-07-23 NOTE — PLAN OF CARE

## 2023-07-23 NOTE — ASSESSMENT & PLAN NOTE
• Routine postpartum care  • Encourage ambulation  • Encourage familial bonding  • Lactation support as needed  • Pain: Motrin/Tylenol around the clock  • Voiding spontaneously, passing flatus  • DVT Ppx: ambulation, SCDs

## 2023-07-23 NOTE — TELEPHONE ENCOUNTER
Gestation:[id number]  NIKOS:7/29  [id number]   Contraction Q5 mins for last hour, getting closer together baseline FM. No additional symptoms. En-route to [facility]   On call provider contacted and informed of patient’s concerns and status. L&D charge nurse notified.

## 2023-07-23 NOTE — DISCHARGE INSTRUCTIONS
Vaginal Delivery   WHAT YOU SHOULD KNOW:   A vaginal delivery is the birth of your baby through your vagina (birth canal). AFTER YOU LEAVE:   Medicines:  NSAIDs  help decrease swelling and pain or fever. This medicine is available with or without a doctor's order. NSAIDs can cause stomach bleeding or kidney problems in certain people. If you take blood thinner medicine, always ask your healthcare provider if NSAIDs are safe for you. Always read the medicine label and follow directions. Take your medicine as directed. Call your healthcare provider if you think your medicine is not helping or if you have side effects. Tell him if you are allergic to any medicine. Keep a list of the medicines, vitamins, and herbs you take. Include the amounts, and when and why you take them. Bring the list or the pill bottles to follow-up visits. Carry your medicine list with you in case of an emergency. Follow up with your primary healthcare provider:  Most women need to return 6 weeks after a vaginal delivery. Ask about how to care for your wounds or stitches. Write down your questions so you remember to ask them during your visits. Activity:  Rest as much as possible. Try to keep all activities short. You may be able to do some exercise soon after you have your baby. Talk with your primary healthcare provider before you start exercising. If you work outside the home, ask when you can return to your job. Kegel exercises:  Kegel exercises may help your vaginal and rectal muscles heal faster. You can do Kegel exercises by tightening and relaxing the muscles around your vagina. Kegel exercises help make the muscles stronger. Breast care:  When your milk comes in, your breasts may feel full and hard. Ask how to care for your breasts, even if you are not breastfeeding. Constipation:  Do not try to push the bowel movement out if it is too hard.  High-fiber foods, extra liquids, and regular exercise can help you prevent constipation. Examples of high-fiber foods are fruit and bran. Prune juice and water are good liquids to drink. Regular exercise helps your digestive system work. You may also be told to take over-the-counter fiber and stool softener medicines. Take these items as directed. Hemorrhoids:  Pregnancy can cause severe hemorrhoids. You may have rectal pain because of the hemorrhoids. Ask how to prevent or treat hemorrhoids. Perineum care: Your perineum is the area between your vagina and anus. Keep the area clean and dry to help it heal and to prevent infection. Wash the area gently with soap and water when you bathe or shower. Rinse your perineum with warm water when you use the toilet. Your primary healthcare provider may suggest you use a warm sitz bath to help decrease pain. A sitz bath is a bathtub or basin filled to hip level. Stay in the sitz bath for 20 to 30 minutes, or as directed. Vaginal discharge: You will have vaginal discharge, called lochia, after your delivery. The lochia is bright red the first day or two after the birth. By the fourth day, the amount decreases, and it turns red-brown. Use a sanitary pad rather than a tampon to prevent a vaginal infection. It is normal to have lochia up to 8 weeks after your baby is born. Monthly periods: Your period may start again within 7 to 12 weeks after your baby is born. If you are breastfeeding, it may take longer for your period to start again. You can still get pregnant again even though you do not have your monthly period. Talk with your primary healthcare provider about a birth control method that will be good for you if you do not want to get pregnant. Mood changes: Many new mothers have some kind of mood changes after delivery. Some of these changes occur because of lack of sleep, hormone changes, and caring for a new baby. Some mood changes can be more serious, such as postpartum depression.  Talk with your primary healthcare provider if you feel unable to care for yourself or your baby. Sexual activity:  You may need to avoid sex for 6 to 7 weeks after you have your baby. You may notice you have a decreased desire for sex, or sex may be painful. You may need to use a vaginal lubricant (gel) to help make sex more comfortable. Contact your primary healthcare provider if:   You have heavy vaginal bleeding that fills 1 or more sanitary pads in 1 hour. You have a fever. Your pain does not go away, or gets worse. The skin between your vagina and rectum is swollen, warm, or red. You have swollen, hard, or painful breasts. You feel very sad or depressed. You feel more tired than usual.     You have questions or concerns about your condition or care. Seek care immediately or call 911 if:   You have pus or yellow drainage coming from your vagina or wound. You are urinating very little, or not at all. Your arm or leg feels warm, tender, and painful. It may look swollen and red. You feel lightheaded, have sudden and worsening chest pain, or trouble breathing. You may have more pain when you take deep breaths or cough, or you may cough up blood. © 2014 [facility] is for End User's use only and may not be sold, redistributed or otherwise used for commercial purposes. All illustrations and images included in CareNotes® are the copyrighted property of [employer] or [employer] Elder. The above information is an  only. It is not intended as medical advice for individual conditions or treatments. Talk to your doctor, nurse or pharmacist before following any medical regimen to see if it is safe and effective for you. Postpartum Perineal Care   WHAT YOU NEED TO KNOW:   Postpartum perineal care is care for your perineum after you have a baby. The perineum is your vagina and anus.    DISCHARGE INSTRUCTIONS:   Care for your perineum:  Healthcare providers will give you a small squirt bottle and show you how to use it. Do the following after you use the toilet and before you put on a new pad:  Remove the soiled pad    Use the squirt bottle to rinse your perineum from front to back while you sit on the toilet     Pat the area dry from front to back with toilet paper or a cotton cloth     Put on a fresh pad    Wash your hands  Decrease pain:  Ask your healthcare provider about these and other ways to decrease perineal pain:  Sitz baths:  Healthcare providers may give you a portable sitz bath. This is a small tub that fits in the toilet. Fill the sitz bath or bathtub with 4 to 6 inches of warm water. Sit in the warm water for 20 minutes 2 to 3 times a day. Ice:  Ice helps decrease swelling and pain. Ice may also help prevent tissue damage. Use an ice pack, or put crushed ice in a plastic bag. Cover it with a towel and place it on your perineum for 15 to 20 minutes every hour, or as directed. Medicine spray, wipes, or pads:  Healthcare providers may give you a medicine spray or wipes soaked with numbing medicine to decrease the pain. Pads that contain an herb called witch hazel may also help reduce pain. Use these after perineal care or a sitz bath. Follow up with your healthcare provider as directed:  Write down your questions so you remember to ask them during your visits. Contact your healthcare provider if:   You have heavy vaginal bleeding that fills 1 or more sanitary pads in 1 hour. You have foul-smelling vaginal discharge. You feel weak or lightheaded. You have questions or concerns about your condition or care. Seek care immediately or call 911 if:   You have large blood clots or bright red blood coming from your vagina. You have abdominal pain, vomiting, and a fever. © 2017 [address] [place] [address] [place] Information is for End User's use only and may not be sold, redistributed or otherwise used for commercial purposes.  All illustrations and images included in [facility] are the copyrighted property of [employer] or [employer] Elder. The above information is an  only. It is not intended as medical advice for individual conditions or treatments. Talk to your doctor, nurse or pharmacist before following any medical regimen to see if it is safe and effective for you. Postpartum Depression   WHAT YOU NEED TO KNOW:   What is postpartum depression? Postpartum depression is a mood disorder that occurs after giving birth. A mood is an emotion or a feeling. Moods affect your behavior and how you feel about yourself and life in general. Depression is a sad mood that you cannot control. Women often feel sad, afraid, or nervous after their baby is born. These feelings are called postpartum blues or baby blues, and they usually go away in 1 to 2 weeks. With postpartum depression, these symptoms get worse and continue for more than 2 weeks. Postpartum depression is a serious condition that affects your daily activities and relationships. What causes postpartum depression? Healthcare providers do not know exactly what causes postpartum depression. It may be caused by a sudden drop in hormone levels after childbirth. A previous episode of postpartum depression or a family history of depression may increase your risk. Several things may trigger postpartum depression:  Lack of support from the baby's father or other family members    Feeling more tired than usual    Stress, a poor diet, or lack of sleep    Pain after childbirth or pain during breastfeeding    Sudden change in lifestyle  How is postpartum depression diagnosed? Postpartum depression affects your daily activities and your relationships with other people. Healthcare providers will ask you questions about your signs and symptoms and how they are affecting your life. The symptoms of postpartum depression usually begin within 1 month after childbirth.  You feel depressed or lose interest in activities you enjoy nearly every day for at least 2 weeks. You also have 4 or more of the following symptoms: You feel tired or have less energy than usual.     You feel unimportant or guilty most of the time. You think about hurting or killing yourself. Your appetite changes. You may lose your appetite and lose weight without trying. Your appetite may also increase and you may gain weight. You are restless, irritable, or withdrawn. You have trouble concentrating and remembering things. You have trouble doing daily tasks or making decisions. You have trouble sleeping, even after the baby is asleep. How is postpartum depression treated? Psychotherapy:  During therapy, you will talk with healthcare providers about how to cope with your feelings and moods. This can be done alone or in a group. It may also be done with family members or your partner. Antidepressants: This medicine is given to decrease or stop the symptoms of depression. You usually need to take antidepressants for several weeks before you begin to feel better. Do not stop taking antidepressants unless your healthcare provider tells you to. Healthcare providers may try a different antidepressant if one type does not work. What can I do to feel better? Rest:  Do not try to do everything all at the same time. Do only what is needed and let other things wait until later. Ask your family or friends for help, especially if you have other children. Ask your partner to help with night feedings or other baby care. Try to sleep when the baby naps. Get emotional support:  Share your feelings with your partner, a friend, or another mother. Take care of yourself:  Shower and dress each day. Do not skip meals. Try to get out of the house a little each day. Get regular exercise. Eat a healthy diet. Avoid alcohol because it can make your depression worse. Do not isolate yourself. Go for a walk or meet with a friend.  It is also important that you have some time by yourself each day. How do I find support and more information? [facility], Public Information & Communication Branch  [facility] [address] [place] [address], [address] [address] [facility], [address] [place] [address] [place]  Nad[place] G[place] [id number]   Phone: [phone]  Phone: [phone]  Web Address: [facility]  When should I contact my healthcare provider? You cannot make it to your next visit. Your depression does not get better with treatment or it gets worse. You have questions or concerns about your condition or care. When should I seek immediate care or call 911? You think about hurting or killing yourself, your baby, or someone else. You feel like other people want to hurt you. You hear voices telling you to hurt yourself or your baby. CARE AGREEMENT:   You have the right to help plan your care. Learn about your health condition and how it may be treated. Discuss treatment options with your caregivers to decide what care you want to receive. You always have the right to refuse treatment. The above information is an  only. It is not intended as medical advice for individual conditions or treatments. Talk to your doctor, nurse or pharmacist before following any medical regimen to see if it is safe and effective for you. © 2017 [address] [place] [address] [place] Information is for End User's use only and may not be sold, redistributed or otherwise used for commercial purposes. All illustrations and images included in CareNotes® are the copyrighted property of [employer] or [employer] Elder. Postpartum Bleeding   WHAT YOU NEED TO KNOW:   Postpartum bleeding is vaginal bleeding after childbirth. This bleeding is normal, whether your baby was born vaginally or by . It contains blood and the tissue that lined the inside of your uterus when you were pregnant.    DISCHARGE INSTRUCTIONS:   What to expect with postpartum bleeding:  Postpartum bleeding usually lasts at least 10 days, and may last longer than 6 weeks. Your bleeding may range from light (barely staining a pad) to heavy (soaking a pad in 1 hour). Usually, you have heavier bleeding right after childbirth, which slows over the next few weeks until it stops. The bleeding is red or dark brown with clots for the first 1 to 3 days. It then turns pink for several days, and then becomes a white or yellow discharge until it ends. Follow up with your obstetrician as directed:  Do not have sex until your obstetrician says it is okay. Write down your questions so you remember to ask them during your visits. Contact your healthcare provider or obstetrician if:   Your bleeding increases, or you have heavy bleeding that soaks a pad in 1 hour for 2 hours in a row. You pass large blood clots. You are breathing faster than normal, or your heart is beating faster than normal.    You are urinating less than usual, or not at all. You feel dizzy. You have questions or concerns about your condition or care. Seek immediate care or call 911 if:   You are suddenly short of breath and feel lightheaded. You have sudden chest pain. © 2017 [address] [place] [address] [place] Information is for End User's use only and may not be sold, redistributed or otherwise used for commercial purposes. All illustrations and images included in CareNotes® are the copyrighted property of [employer] or [employer] Elder. The above information is an  only. It is not intended as medical advice for individual conditions or treatments. Talk to your doctor, nurse or pharmacist before following any medical regimen to see if it is safe and effective for you. Breast Care for the Breast Feeding Mother   WHAT YOU SHOULD KNOW:   Your breasts will go through normal changes while you are breastfeeding. Sometimes breast and nipple problems can develop while you are breastfeeding.  Learn about changes that are normal and those that may be a problem. Breast care can help you prevent and manage problems so you and your baby can enjoy the benefits of breastfeeding. AFTER YOU LEAVE:   Breast changes while you are breastfeeding: For the first few days after your baby is born, your body makes a small amount of breast milk (colostrum). Within about 2 to 5 days, your body will begin making mature milk. It may take up to 10 days or longer for mature milk to come in. When your mature milk comes in, your breasts will become full and firm. They may feel tender. Breastfeeding your baby will decrease the full feeling in your breasts. You may feel a tingly sensation during feedings as milk is released from your breasts. This is called the milk let-down reflex. After 7 or more days, the fullness may feel like it has decreased. Your nipples should look the same as they did before you started breastfeeding. Breasts that feel full before and empty after breastfeeding are signs that breastfeeding is going well. Breast problems that can occur while you are breastfeeding:   Nipple soreness  may occur when you begin to breastfeed your baby. You may also have nipple soreness if your baby is not latched on to your breast correctly. Correct positioning and latch-on may decrease or stop the pain in your nipples. Work with your caregivers to help your baby latch on correctly. It may also be helpful to place warm, wet compresses on your nipples to help decrease pain. Plugged milk ducts  may cause painful breast lumps. Plugged ducts may be caused by not emptying your breasts completely during feedings. When your baby pauses during breastfeeding, massage and gently squeeze your breast. Gentle massage may unplug a blocked milk duct. Pump out any milk left in your breasts after your baby is done breastfeeding. Avoid wearing tight tops, tight bras, or under-wire bras, because they may put pressure on your breasts.     Engorgement  may occur as your milk comes in soon after you begin breastfeeding. Engorgement may cause your breasts to become swollen and painful. Your breasts may also become engorged if you miss a feeding or you do not breastfeed on demand. The best way to decrease engorgement symptoms is to empty your breasts by feeding your baby often. Engorgement can make it hard for your baby to latch on to your breast. If this happens, express a small amount of milk and then have your baby latch on. Cold compresses, gel packs, or ice packs on your breasts can help decrease pain and swelling. Ask your caregiver how often and how long you should use cold, or ice packs. A breast infection called mastitis  can develop if you have plugged milk ducts or engorgement. Mastitis causes your breasts to become red, swollen, and painful. You may also have flu-like symptoms, such as chills and a fever. Place heat on your breasts to help decrease the pain. You may want to place a moist, warm cloth on the painful breast or both of your breasts. Ask how often to do this. Your primary healthcare provider [facility]) may suggest that you take an NSAID, such as ibuprofen, to decrease pain and swelling. He may also order antibiotics to treat mastitis. Ask about feeding your baby when you have a breast infection. How to help prevent or manage breast problems while you are breastfeeding:   Learn how to position your baby and latch him on correctly. To latch your baby correctly to your breast, make sure that his mouth covers most of your areola (dark area around your nipple). He should not be attached only to the nipple. Your baby is latched on well if you feel comfortable and do not feel pain. A correct latch helps him get enough milk and can help to prevent sore nipples and other breast problems. There are several breastfeeding positions that you can try. Find the position that works best for you and your baby.  Ask your caregiver for more information about how to hold and breastfeed your baby. Prevent biting. Your baby may get teeth at about 1to 3months of age. To help prevent biting, break his suction once he is finished or if he has fallen asleep. To break his suction, slip a finger into the side of his mouth. If your baby bites you, respond with surprise or unhappiness. Offer praise when he does not bite you. Breastfeed your baby regularly. Feed your baby 8 to 12 times a day. You may need to wake up your baby at night to feed him. It is okay to feed from 1 or both breasts at each feeding. Your baby should breastfeed from both breasts equally over the course of a day. If your baby only feeds from 1 side during a feeding, offer your other breast to him first for the next feeding. Schedule and keep follow-up visits. Talk to your baby's pediatrician or your PHP during follow-up visits if you have breast problems. Caregivers may suggest that you, or you and your partner, attend classes on breastfeeding. You also may want to join a breastfeeding support group. Caregivers may suggest that you see a lactation consultant. This is a caregiver who can help you with breastfeeding. Contact your PHP if:   You have a fever and chills. You have body aches and you feel like you do not have any energy. One or both of your breasts is red, swollen or hard, painful, and feels warm or hot. You have breast engorgement that does not get better within 24 hours. You see or feel a lump in your breast that hurts when you touch it. You have nipple pain during breastfeeding or between feedings. Your nipples are red, dry, cracked, or bleeding, or they have scabs on them. You have questions or concerns about your condition or care. © 2014 [facility] is for End User's use only and may not be sold, redistributed or otherwise used for commercial purposes.  All illustrations and images included in CareNotes® are the copyrighted property of A[employer] or [facility]. The above information is an  only. It is not intended as medical advice for individual conditions or treatments. Talk to your doctor, nurse or pharmacist before following any medical regimen to see if it is safe and effective for you.

## 2023-07-23 NOTE — DISCHARGE SUMMARY
Discharge Summary - Darwin Aguilar 26 y.o. female MRN: [id number]    Unit/Bed#: -01 Encounter: [id number]    ADMISSION  Admission Date: 2023   Admitting Attending: Dr. Tabby Valencia MD  Admitting Diagnoses:   1. Pregnancy at 39w1d   2. Asthma  3. Iron deficiency anemia    DELIVERY  Delivery Method: Vaginal, Spontaneous    Delivery Date and Time: 2023  12:01 AM   Delivery Attending: Tabby Valencia     DISCHARGE  Discharge Date: 23    Discharge Attending: Dr. Tabby Valencia MD  Discharge Diagnosis:   Same, Delivered    Clinical course: Admission to Delivery    Ms. Darwin Aguilar is a 26 y.o. [id number] at 39w1d. She presented to labor and delivery for contractions. Her pregnancy was complicated by iron deficiency anemia and received outpatient iron infusions. On exam she was noted to be 9/100/+1 with intact membranes. Patient was admitted for labor, made cervical change to complete, and began pushing. Delivery  Route of Delivery: Vaginal, Spontaneous   Anesthesia: None ,   QBL: Non-Surgical QBL (mL): 100    Delivery: Vaginal, Spontaneous  at 2023  12:01 AM   Laceration: Perineal: None  Repaired? Baby's Weight:  ;      Apgar scores: 9  and 9  at 1 and 5 minutes, respectively      Clinical Course: Post-Delivery:  The post delivery course was unremarkable. On the day of discharge, the patient was ambulating, voiding spontaneously, tolerating oral intake, and hemodynamically stable. She was able to reasonably perform all ADLs. She had appropriate bowel function. Pain was well-controlled. She was discharged home on postpartum/postop day #1 without complications. Patient was instructed to follow up with her OB as an outpatient and was given appropriate warnings to call her provider with problems or concerns. Pertinent lab findings included:   Blood type A positive.      Last three Hgb values:  Lab Results   Component Value Date    HGB 13.5 2023    HGB 11.8 2023    HGB 9.7 (L) 2023        Problem-specific follow-up plans included the following:  Problem List        Respiratory    Asthma    Overview     Overview:   ICD10 clean up         Current Assessment & Plan     Albuterol PRN            Cardiovascular and Mediastinum    Near syncope       Immune and Lymphatic    Adenitis       Other    Vitamin D deficiency    Abdominal bruit    Hyperlipidemia    Mass of right axilla    Neck mass    Prenatal care, subsequent pregnancy in second trimester    Multigravida of advanced maternal age in third trimester    History of bradycardia    Iron deficiency anemia of pregnancy    Current Assessment & Plan     PTA Hgb 11.8, s/p venofer infusions  Admission Hgb 13.5         38 weeks gestation of pregnancy    Sore throat    * (Principal)  (spontaneous vaginal delivery)    Current Assessment & Plan     Pain well-controlled  Voiding spontaneously, passing flatus  Encouraged breastfeeding  Encouraged ambulation             Discharge med list:     Medication List      ASK your doctor about these medications    • albuterol 90 mcg/act inhaler; Commonly known as: PROVENTIL HFA,VENTOLIN   HFA;  Inhale 2 puffs every 6 (six) hours as needed for wheezing  • PRENATAL VITAMINS PO       Condition at discharge:   stable     Disposition:   Home    Planned Readmission:   Fernanda Vicente MD  23  8:54 AM

## 2023-07-23 NOTE — TELEPHONE ENCOUNTER
Reason for Disposition  • [1] History of prior delivery (multipara) AND [2] contractions < 10 minutes apart AND [3] present 1 hour    Answer Assessment - Initial Assessment Questions  1. ONSET: "When did the symptoms begin?"       Over a hour ago   2. CONTRACTIONS: "Describe the contractions that you are having." (e.g., duration, frequency, regularity, severity)     Q5 mins for last hour  3. NIKOS: "What date are you expecting to deliver?"      4. PARITY: "Have you had a baby before?" If Yes, ask: "How long did the labor last?"      5. FETAL MOVEMENT: "Has the baby's movement decreased or changed significantly from normal?"    baseline   6.  OTHER SYMPTOMS: "Do you have any other symptoms?" (e.g., leaking fluid from vagina, vaginal bleeding, fever, hand/facial swelling)     Denies    Protocols used: PREGNANCY - LABOR-ADULT-

## 2023-07-23 NOTE — PLAN OF CARE

## 2023-07-23 NOTE — H&P
[address] [place] [address] 26 y.o. female MRN: [id number]  Unit/Bed#: -01 Encounter: [id number]    Assessment: 26 y.o. [id number] at 39w1d admitted for labor . SVE: /+1  GBS status: negative     Plan:   38 weeks gestation of pregnancy  Assessment & Plan  Admit   T&S, CBC, RPR  CLD  IV fluids  GBS prophylaxis is not needed  Anticipate vaginal delivery    Iron deficiency anemia of pregnancy  Assessment & Plan  PTA Hgb 11.8, s/p venofer infusions  Admission Hgb 13.5    Asthma  Assessment & Plan  Albuterol PRN          Discussed case and plan w/ Dr. Rob Wells      Chief Complaint: contractions    HPI: Alison Bae is a 26 y.o. [id number] with an NIKOS of 2023, by Last Menstrual Period at 39w1d who is being admitted for labor . She complains of uterine contractions, occurring every 1-2 minutes, has no LOF, and reports no VB. She states she has felt good FM. Patient Active Problem List   Diagnosis   • Asthma   • Vitamin D deficiency   • Abdominal bruit   • Hyperlipidemia   • Mass of right axilla   • Adenitis   • Neck mass   • Prenatal care, subsequent pregnancy in second trimester   • Multigravida of advanced maternal age in third trimester   • History of bradycardia   • Near syncope   • Iron deficiency anemia of pregnancy   • 38 weeks gestation of pregnancy   • Sore throat   •  (spontaneous vaginal delivery)       Baby complications/comments: none    Review of Systems   Constitutional: Negative for chills and fever. HENT: Negative for congestion, sinus pressure and sinus pain. Eyes: Negative for visual disturbance. Respiratory: Negative for cough, shortness of breath and wheezing. Cardiovascular: Negative for chest pain, palpitations and leg swelling. Gastrointestinal: Positive for abdominal pain (contractions). Negative for constipation, diarrhea, nausea and vomiting. Genitourinary: Negative for dysuria, vaginal bleeding and vaginal discharge.    Skin: Negative for color change, pallor and rash. Neurological: Negative for weakness and light-headedness. Psychiatric/Behavioral: Negative for agitation and behavioral problems. OB Hx:  OB History    Para Term  AB Living   5 4 4 0 0 4   SAB IAB Ectopic Multiple Live Births   0 0 0 0 4      # Outcome Date GA Lbr Dawit/2nd Weight Sex Delivery Anes PTL Lv   5 Current            4 Term 18 40w2d / 00:05 3941 g (8 lb 11 oz) F Vag-Spont None N FLORIAN      Complications: Unstable lie of fetus   3 Term 16 40w6d / 00:14 3890 g (8 lb 9.2 oz) F Vag-Spont None N FLORIAN      Birth Comments: Healthy Baby girl   2 Term 14 38w5d  3600 g (7 lb 15 oz) F Vag-Spont None N FLORIAN      Birth Comments: Healthy Baby   1 Term 13 39w0d  3685 g (8 lb 2 oz) F Vag-Spont None N FLORIAN      Birth Comments: Healthy Baby       Past Medical Hx:  Past Medical History:   Diagnosis Date   • Anemia     mild anemia with prev pregnancy    • Anorexia     resolved   • Anxiety    • Asthma     hx of asthma   • Headache(784.0)    • Migraine     As Child/Resolved   • Normal delivery     2013 daughter, 2014 daughter, 2016 daughter   • PONV (postoperative nausea and vomiting)    • Varicella     As child       Past Surgical hx:  Past Surgical History:   Procedure Laterality Date   • ARM SKIN LESION BIOPSY / EXCISION Right    • MYRINGOTOMY W/ TUBES     • WISDOM TOOTH EXTRACTION           No Known Allergies    Medications Prior to Admission   Medication   • albuterol (PROVENTIL HFA,VENTOLIN HFA) 90 mcg/act inhaler   • Prenatal Vit-Fe Fumarate-FA (PRENATAL VITAMINS PO)       Objective:  HR:  [110] 110  Resp:  [18] 18  BP: (132)/(72) 132/72  There is no height or weight on file to calculate BMI. Physical Exam:  Physical Exam  Constitutional:       General: She is not in acute distress. Genitourinary:      Vulva normal.   HENT:      Head: Normocephalic.       Right Ear: External ear normal.      Left Ear: External ear normal.   Eyes:      General: No scleral icterus. Right eye: No discharge. Left eye: No discharge. Conjunctiva/sclera: Conjunctivae normal.   Cardiovascular:      Rate and Rhythm: Normal rate. Pulses: Normal pulses. Pulmonary:      Effort: Pulmonary effort is normal. No respiratory distress. Abdominal:      Palpations: Abdomen is soft. Tenderness: There is no abdominal tenderness. There is no guarding. Comments: Gravid uterus   Musculoskeletal:         General: No swelling or tenderness. Normal range of motion. Cervical back: Normal range of motion. Right lower leg: No edema. Left lower leg: No edema. Neurological:      Mental Status: She is alert and oriented to person, place, and time. Mental status is at baseline. Skin:     General: Skin is warm and dry. Capillary Refill: Capillary refill takes less than 2 seconds. Psychiatric:         Behavior: Behavior normal.         Thought Content: Thought content normal.   Vitals and nursing note reviewed. Exam conducted with a chaperone present.             FHT:  Baseline Rate: 135 bpm  Variability: Moderate 6-25 bpm  Accelerations: 15 x 15 or greater, At variable times  Decelerations: None  FHR Category: Category I    TOCO:   Contraction Frequency (minutes): 2-2.5  Contraction Duration (seconds):   Contraction Quality: Strong    Lab Results   Component Value Date    WBC 8.58 07/22/2023    HGB 13.5 07/22/2023    HCT 41.4 07/22/2023     07/22/2023     Lab Results   Component Value Date     03/17/2015    K 4.3 03/23/2022     03/23/2022    CO2 27 03/23/2022    BUN 14 03/23/2022    CREATININE 0.71 03/23/2022    GLUCOSE 87 03/17/2015    AST 13 03/23/2022    ALT 19 03/23/2022     Prenatal Labs: Reviewed     Blood type: A pos  Antibody: neg  GBS: neg  HIV: collected, neg  Rubella: immune  Syphilis IgM/IgG: neg  HBsAg: neg  HCAb: neg  Chlamydia: neg  Gonorrhea: neg  Diabetes 1 hour screen: elev  3 hour glucose: wnl  Platelets: 755  Hgb: 11.8  >2 Midnights  INPATIENT     Signature/Title: Janusz Villalba MD  Date: 7/23/2023  Time: 1:12 AM

## 2023-07-23 NOTE — L&D DELIVERY NOTE
Vaginal Delivery Summary - OB/GYN   Tushar Antonio 26 y.o. female MRN: [id number]  Unit/Bed#: -01 Encounter: [id number]      Pre-delivery Diagnosis:   1. Pregnancy at 39w1d   2. Asthma  3. Iron deficiency anemia    Post-delivery Diagnosis: same, delivered    Procedure: Spontaneous Vaginal Delivery    Attending: Dr. Cheri Park    Assistant(s): Dr. Chris Shrestha    Anesthesia: none    QBL: 496QN    Complications: none apparent    Specimens:   1. Arterial and venous cord gases  2. Cord blood  3. Segment of umbilical cord  4. Placenta to storage     Findings:  1. Viable female on 2023 at 0001, with APGARS of 9 and 9 at 1 and 5 minutes respectively,  2. Spontaneous delivery of intact placenta at 0009  3. Intact perineum  4. Blood gases:  Umbilical Artery  Recent Labs     23  0005   PHCART 7.335   BECART -0.7*       Umbilical Vein  Recent Labs     23  0005   PHCVEN 7.457   BECVEN -1.8*       Disposition:  Patient tolerated the procedure well and was recovering in labor and delivery room       Brief history and labor course:  Ms. Tushar Antonio is a 26 y.o. [id number] at 39w1d. She presented to labor and delivery for contractions. Her pregnancy was complicated by iron deficiency anemia and received outpatient iron infusions. On exam she was noted to be 9/100/+1 with intact membranes. Patient was admitted for labor, made cervical change to complete, and began pushing. Description of procedure:  After pushing for 7 minutes, at 0001 patient delivered a viable female , wt pending, apgars of 9 (1 min) and 9 (5 min). The fetal vertex delivered spontaneously. There was a single, tight nuchal cord that was atraumatically delivered through. The right anterior shoulder delivered atraumatically with maternal expulsive forces and the assistance of gentle downward traction. The left posterior shoulder delivered with maternal expulsive forces and the assistance of gentle upward traction.  The remainder of the fetus delivered spontaneously. Upon delivery, the infant was placed on the mothers abdomen and the cord was doubly clamped and cut after >30 seconds. The infant was noted to cry spontaneously and was moving all extremities appropriately. There was no evidence for injury. Awaiting nurse resuscitators evaluated the . Arterial and venous cord blood gases and cord blood was collected for analysis. These were promptly sent to the lab. In the immediate post-partum, 30 units of IV pitocin was administered, and the uterus was noted to contract down well with massage and pitocin. The placenta delivered spontaneously at [address] [facility] and was noted to have a centrally inserted 3 vessel cord. Retained placental membranes were removed with ring forceps and grasped during bimanual exam.    The vagina, cervix, perineum, and rectum were inspected and there was noted to be an intact perineum. Excellent hemostasis was appreciated. At the conclusion of the procedure, all needle, sponge, and instrument counts were noted to be correct. Patient tolerated the procedure well and was allowed to recover in labor and delivery room with family and  before being transferred to the post-partum floor. Dr. Markel Crump was present and participated in all key portions of the case.       Elizabeth Vásquez MD  OB/GYN PGY-3  2023  1:15 AM

## 2023-07-23 NOTE — TELEPHONE ENCOUNTER
Regardin Weeks pregnant I'm in labor  ----- Message from Omaira Bonilla sent at 2023 11:01 PM EDT -----  '' I'm 39 weeks pregnant and I'm in labor.''

## 2023-07-24 ENCOUNTER — TRANSITIONAL CARE MANAGEMENT (OUTPATIENT)
Dept: INTERNAL MEDICINE CLINIC | Facility: CLINIC | Age: 33
End: 2023-07-24

## 2023-07-24 VITALS
OXYGEN SATURATION: 98 % | TEMPERATURE: 98.4 F | SYSTOLIC BLOOD PRESSURE: 112 MMHG | RESPIRATION RATE: 20 BRPM | HEART RATE: 53 BPM | DIASTOLIC BLOOD PRESSURE: 64 MMHG

## 2023-07-24 PROCEDURE — 99024 POSTOP FOLLOW-UP VISIT: CPT | Performed by: OBSTETRICS & GYNECOLOGY

## 2023-07-24 PROCEDURE — TCMXX

## 2023-07-24 RX ORDER — IBUPROFEN 600 MG/1
600 TABLET ORAL EVERY 6 HOURS PRN
Qty: 30 TABLET | Refills: 0
Start: 2023-07-24

## 2023-07-24 RX ORDER — DIAPER,BRIEF,INFANT-TODD,DISP
1 EACH MISCELLANEOUS DAILY PRN
Qty: 30 G | Refills: 0
Start: 2023-07-24

## 2023-07-24 RX ORDER — CALCIUM CARBONATE 500 MG/1
1000 TABLET, CHEWABLE ORAL DAILY PRN
Refills: 0
Start: 2023-07-24

## 2023-07-24 RX ORDER — DOCUSATE SODIUM 100 MG/1
100 CAPSULE, LIQUID FILLED ORAL 2 TIMES DAILY
Refills: 0
Start: 2023-07-24

## 2023-07-24 RX ORDER — ACETAMINOPHEN 325 MG/1
650 TABLET ORAL EVERY 4 HOURS PRN
Refills: 0
Start: 2023-07-24

## 2023-07-24 NOTE — UTILIZATION REVIEW
NOTIFICATION OF INPATIENT ADMISSION   MATERNITY/DELIVERY AUTHORIZATION REQUEST   SERVICING FACILITY:   [address] [place] [address] - L&D, , NICU  [address] [facility]. [address]  Tax ID: [id number]  NPI: [id number]   ATTENDING PROVIDER:  Attending Name and NPI#: Daniella Haney Md [[id number]]  Address: [address] [place] [address]  Phone: [phone]   ADMISSION INFORMATION:  Place of Service: Inpatient [address] [facility]   Place of Service Code: 21  Inpatient Admission Date/Time: 23 11:26 PM  Discharge Date/Time: 2023 10:15 AM  Admitting Diagnosis Code/Description:  Pregnant and not yet delivered [Z34.90]  False labor, unspecified [O47.9]  39 weeks gestation of pregnancy [Z3A.39]  Encounter for full-term uncomplicated delivery [T14]     Mother: Stefanie Liang 1990 Estimated Date of Delivery: 23  Delivering clinician: Daniella Haney    OB History        5    Para   5    Term   5       0    AB   0    Living   5       SAB   0    IAB   0    Ectopic   0    Multiple   0    Live Births   5               [place] Name & MRN:   Information for the patient's :  Alyssa Rivero Girl Jeet Organ) [[id number]]      Delivery Information:  Sex: female  Delivered 2023 12:01 AM by Vaginal, Spontaneous; Gestational Age: 37w4d     Measurements:  Weight: 8 lb 5.7 oz (3790 g); Height: 21.5"    APGAR 1 minute 5 minutes 10 minutes   Totals: 9 9      [place] Birth Information: 26 y.o. female MRN: [id number] Unit/Bed#: -01   Birthweight: No birth weight on file. Gestational Age: <None> Delivery Type:    APGARS Totals:        UTILIZATION REVIEW CONTACT:  Lou Coy Utilization   Network Utilization Review Department  Phone: [phone]  Fax [phone]  Email: [email or web address]  Contact for approvals/pending authorizations, clinical reviews, and discharge.      PHYSICIAN ADVISORY SERVICES:  Medical Necessity Denial & Jgio-uo-Dhbv Review  Phone: [phone]  Fax: [phone]  Email: [email or web address] Bexarotene Counseling:  I discussed with the patient the risks of bexarotene including but not limited to hair loss, dry lips/skin/eyes, liver abnormalities, hyperlipidemia, pancreatitis, depression/suicidal ideation, photosensitivity, drug rash/allergic reactions, hypothyroidism, anemia, leukopenia, infection, cataracts, and teratogenicity.  Patient understands that they will need regular blood tests to check lipid profile, liver function tests, white blood cell count, thyroid function tests and pregnancy test if applicable.

## 2023-07-24 NOTE — LACTATION NOTE
This note was copied from a baby's chart. CONSULT - LACTATION  Baby Girl (Milad Torres 0 days female MRN: [id number]    [address] Room / Bed: (N)/(N) Encounter: [id number]    Maternal Information     MOTHER:  Zhanna Torres  Maternal Age: 26 y.o.   OB History: # 1 - Date: 08/12/13, Sex: Female, Weight: 3685 g (8 lb 2 oz), GA: 39w0d, Delivery: Vaginal, Spontaneous, Apgar1: None, Apgar5: None, Living: Living, Birth Comments: Healthy Baby    # 2 - Date: 08/27/14, Sex: Female, Weight: 3600 g (7 lb 15 oz), GA: 38w5d, Delivery: Vaginal, Spontaneous, Apgar1: None, Apgar5: None, Living: Living, Birth Comments: Healthy Baby    # 3 - Date: 06/22/16, Sex: Female, Weight: 3890 g (8 lb 9.2 oz), GA: 40w6d, Delivery: Vaginal, Spontaneous, Apgar1: 8, Apgar5: 9, Living: Living, Birth Comments: Healthy Baby girl    # 4 - Date: 11/07/18, Sex: Female, Weight: 3941 g (8 lb 11 oz), GA: 40w2d, Delivery: Vaginal, Spontaneous, Apgar1: 9, Apgar5: 10, Living: Living, Birth Comments: None    # 5 - Date: 07/23/23, Sex: Female, Weight: 3790 g (8 lb 5.7 oz), GA: 39w1d, Delivery: Vaginal, Spontaneous, Apgar1: 9, Apgar5: 9, Living: Living, Birth Comments: None   Previouse breast reduction surgery? No    Lactation history:   Has patient previously breast fed: Yes   How long had patient previously breast fed: 4 yo 3 mos, 5yo 6yo and 3yo for 1 year each. Previous breast feeding complications: Other (Comment) (Mastitis with other 4 girls.  Had to stop breastfeeding with her first baby per her OB because she was pregnant with her 2nd baby!)     Past Surgical History:   Procedure Laterality Date   • ARM SKIN LESION BIOPSY / EXCISION Right    • MYRINGOTOMY W/ TUBES     • WISDOM TOOTH EXTRACTION          Birth information:  YOB: 2025   Time of birth: 12:01 AM   Sex: female   Delivery type: Vaginal, Spontaneous   Birth Weight: 3790 g (8 lb 5.7 oz)   Percent of Weight Change: 0% Gestational Age: 37w4d   [unfilled]    Assessment     Breast and nipple assessment: normal assessment     Assessment: normal assessment    Feeding assessment: not assessed  LATCH:  Latch: Audible Swallowing:    Type of Nipple:    Comfort (Breast/Nipple):    Hold (Positioning):    LATCH Score:         Feeding recommendations:  breast feed on demand      Met with Flor Bangura and provided her with the Ready Set Baby and the Discharge Breastfeeding Booklets and briefly reviewed information and answered Zhanna's questions. She is an experienced breastfeeding mom and reports that her baby is latching well. Encouraged her to call if she has additional questions or needs breastfeeding support. Phone number provided.         Suzette Mendoza RN 2023 9:28 PM

## 2023-07-24 NOTE — LACTATION NOTE
This note was copied from a baby's chart. CONSULT - LACTATION  Baby Girl ([address]) [address] [address] 1 days female MRN: [id number]    [address] Room / Bed: (N)/(N) Encounter: [id number]    Maternal Information     MOTHER:  Zhanna Torres  Maternal Age: 26 y.o.   OB History: # 1 - Date: 08/12/13, Sex: Female, Weight: 3685 g (8 lb 2 oz), GA: 39w0d, Delivery: Vaginal, Spontaneous, Apgar1: None, Apgar5: None, Living: Living, Birth Comments: Healthy Baby    # 2 - Date: 08/27/14, Sex: Female, Weight: 3600 g (7 lb 15 oz), GA: 38w5d, Delivery: Vaginal, Spontaneous, Apgar1: None, Apgar5: None, Living: Living, Birth Comments: Healthy Baby    # 3 - Date: 06/22/16, Sex: Female, Weight: 3890 g (8 lb 9.2 oz), GA: 40w6d, Delivery: Vaginal, Spontaneous, Apgar1: 8, Apgar5: 9, Living: Living, Birth Comments: Healthy Baby girl    # 4 - Date: 11/07/18, Sex: Female, Weight: 3941 g (8 lb 11 oz), GA: 40w2d, Delivery: Vaginal, Spontaneous, Apgar1: 9, Apgar5: 10, Living: Living, Birth Comments: None    # 5 - Date: 07/23/23, Sex: Female, Weight: 3790 g (8 lb 5.7 oz), GA: 39w1d, Delivery: Vaginal, Spontaneous, Apgar1: 9, Apgar5: 9, Living: Living, Birth Comments: None   Previouse breast reduction surgery? No    Lactation history:   Has patient previously breast fed: Yes   How long had patient previously breast fed: 4 yo 3 mos, 5yo 6yo and 1yo for 1 year each. Previous breast feeding complications: Other (Comment) (Mastitis with other 4 girls.  Had to stop breastfeeding with her first baby per her OB because she was pregnant with her 2nd baby!)     Past Surgical History:   Procedure Laterality Date   • ARM SKIN LESION BIOPSY / EXCISION Right    • MYRINGOTOMY W/ TUBES     • WISDOM TOOTH EXTRACTION          Birth information:  YOB: 2025   Time of birth: 12:01 AM   Sex: female   Delivery type: Vaginal, Spontaneous   Birth Weight: 3790 g (8 lb 5.7 oz)   Percent of Weight Change: -4% Gestational Age: 37w4d   [unfilled]    Assessment     Breast and nipple assessment: normal assessment       07/24/23 0945   Lactation Consultation   Reason for Consult 8 m   Breasts/Nipples   Breastfeeding Status Yes   Breastfeeding Progress Breastfeeding well  (Per Nena Mckeon)   Patient Follow-Up   Lactation Consult Status 2   Follow-Up Type Inpatient;Call as needed   Other OB Lactation Documentation    Additional Problem Noted Denies needs. Says breastfeeding experience is WNL for what is expected. Denies nipple pain. Reports breasts are filling. Feeding recommendations:  breast feed on demand     Encouraged parents to call for assistance, questions, and concerns about breastfeeding. Extension provided.       Verdia Hamman, RN 7/24/2023 9:47 AM

## 2023-07-24 NOTE — PLAN OF CARE
Problem: POSTPARTUM  Goal: Experiences normal postpartum course  Description: INTERVENTIONS:  - Monitor maternal vital signs  - Assess uterine involution and lochia  Outcome: Adequate for Discharge

## 2023-07-24 NOTE — PROGRESS NOTES
Obstetrics Progress Note  Milly Desai 26 y.o. female MRN: [id number]  Unit/Bed#: -01 Encounter: [id number]    Assessment/Plan:  Postpartum Day #1 s/p . Stable. Baby in room. By issue:  *  (spontaneous vaginal delivery)  Assessment & Plan  • Routine postpartum care  • Encourage ambulation  • Encourage familial bonding  • Lactation support as needed  • Pain: Motrin/Tylenol around the clock  • Voiding spontaneously, passing flatus  • DVT Ppx: ambulation, SCDs    Iron deficiency anemia of pregnancy  Assessment & Plan  PTA Hgb 11.8, s/p venofer infusions  Admission Hgb 13.5    Asthma  Assessment & Plan  Albuterol PRN    Anticipate discharge 23  . Subjective/Objective   Chief Complaint:   Post delivery     Subjective:   Pain: Controlled. Tolerating PO: yes. Voiding: yes. Flatus: yes. Ambulating: yes. Chest pain: no. Shortness of breath: no. Leg pain: no. Lochia: within normal limits. Infant feeding: breast.    Objective:   Vitals:   Temp:  [97.8 °F (36.6 °C)-98.7 °F (37.1 °C)] 98.3 °F (36.8 °C)  HR:  [55-71] 55  Resp:  [18] 18  BP: ()/(55-63) 102/57     No intake or output data in the 24 hours ending 23 0659    Lab Results   Component Value Date    WBC 8.58 2023    HGB 13.5 2023    HCT 41.4 2023    MCV 89 2023     2023       Physical Exam:   General: alert and oriented x3, in no apparent distress  Cardiovascular: regular rate and rhythm   Pulmonary: normal effort, clear to auscultation bilaterally  Abdomen: Soft, non-tender, non-distended, no rebound or guarding.  Uterine fundus firm and non-tender, -1 cm below the umbilicus   Extremities: Non tender     Donald Joel MD  PGY-I, OBGYN  2023, 6:59 AM

## 2023-07-24 NOTE — PLAN OF CARE

## 2023-07-26 ENCOUNTER — TELEPHONE (OUTPATIENT)
Dept: OBGYN CLINIC | Facility: CLINIC | Age: 33
End: 2023-07-26

## 2023-07-26 DIAGNOSIS — N61.0 MASTITIS: Primary | ICD-10-CM

## 2023-07-26 NOTE — TELEPHONE ENCOUNTER
Pt delivered 7/23. She is breastfeeding and thinks she has mastitis. Top of r breast is red and sore. She has had previous mastitis with other children and knows sx. She is achy. No fever . No allergies Pharm is [facility]. Can you please put a script in ? Linnette Elliott  [birth year]    RX at pharmacy.  Pt aware

## 2023-07-26 NOTE — TELEPHONE ENCOUNTER
Patient delivered on 7/23 and believes she may have mastitis. Symptoms including body aches and right breast is red/warm. Denies fever.

## 2023-07-30 LAB — PLACENTA IN STORAGE: NORMAL

## 2023-08-10 ENCOUNTER — POSTPARTUM VISIT (OUTPATIENT)
Dept: OBGYN CLINIC | Facility: CLINIC | Age: 33
End: 2023-08-10

## 2023-08-10 VITALS — WEIGHT: 156.6 LBS | BODY MASS INDEX: 25.66 KG/M2

## 2023-08-10 NOTE — PROGRESS NOTES
Patient is here for postpartum visit.   Vaginal delivery 23  Apgar score: 9 & 9  Laceration: none   incision: N/A    Baby's weight 8lbs/5.7oz  Gender: female- Kadeem  Breast feed: yes  Birth control: undecided  PPDS: 7  Concerns: none

## 2023-08-10 NOTE — PROGRESS NOTES
Marci Wood  1990      Assessment:  Diagnoses and all orders for this visit:    Encounter for postpartum visit    Mother currently breast-feeding        Plan:    Contraception: Planning withdrawal until 's vasectomy. She is aware of alternative/more effective methods, efficacy of withdrawal, recommended interpregnancy interval, and limitations of ovulation prediction in first year following delivery. Reviewed risk associated with SIP. Reviewed postpartum perineal care and guidelines on resuming physical and sexual activity. [facility] recommended for lactation consultation, mental health support, and additional resources. Advised to call with any issues, all concerns & questions addressed. Pap due: 2024    She will return in 3 months for her yearly exam, sooner PRN. __________________________________________________________________        S:  26 y.o. female here for postpartum visit. She is now  s/p  on 23 @ 39w1d. Pregnancy complicated by iron deficiency anemia, AMA, asthma. Uncomplicated delivery with intact perineum. PP course uncomplicated. Thought she had mastitis on day 4 PP, however, this resolved with warm compress, massage, and pumping. No current breast complaints. Feels she is doing well and adjusting. No concerns for anxiety and depression. Admits some ups and downs - mostly related to demands from her other daughters, adjusting to needs, and family's upcoming trip to beach tomorrow. EPDS 7. Gender: female   Apgars: 9 and 9  Weight: 8 lbs 5.7 oz  Her bleeding is is light and decreasing. She is Breastfeeding without problems. Last Pap: 2019 NILM/hpv neg    She is aware of contraceptive options while nursing. Planning to utilize withdrawal until  has vasectomy (planning for this next march). Has never use contraception. .     Review of Systems   Respiratory: Negative. Cardiovascular: Negative. Gastrointestinal: Negative for constipation and diarrhea. Genitourinary: Negative for difficulty urinating, pelvic pain, vaginal bleeding, vaginal discharge, itching or odor.       Past Medical History:   Diagnosis Date   • Anemia     mild anemia with prev pregnancy 2018   • Anorexia     resolved   • Anxiety    • Asthma     hx of asthma   • Headache(784.0)    • Migraine     As Child/Resolved   • Normal delivery     2013 daughter, 2014 daughter, 2016 daughter   • PONV (postoperative nausea and vomiting)    • Varicella     As child     Family History   Problem Relation Age of Onset   • Hypertension Mother    • Uterine cancer Mother 48   • Hypothyroidism Mother    • Ankylosing spondylitis Mother    • Asthma Mother    • Cancer Mother         Uterine   • Thyroid disease Mother    • Cancer Father         Prostate   • Prostate cancer Father    • No Known Problems Sister    • No Known Problems Sister    • No Known Problems Brother    • No Known Problems Daughter    • No Known Problems Daughter    • No Known Problems Daughter    • No Known Problems Daughter    • Diabetes Maternal Grandmother    • Stroke Maternal Grandmother    • Cancer Maternal Grandfather         lung   • Lung cancer Maternal Grandfather    • Diabetes Maternal Grandfather    • Heart disease Paternal Grandfather    • Breast cancer Neg Hx    • Colon cancer Neg Hx    • Ovarian cancer Neg Hx    • Cervical cancer Neg Hx      Social History     Socioeconomic History   • Marital status: /Civil Union     Spouse name: None   • Number of children: None   • Years of education: None   • Highest education level: None   Occupational History   • 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   Tobacco Use   • Smoking status: Never   • Smokeless tobacco: Never   Vaping Use   • Vaping Use: Never used   Substance and Sexual Activity   • Alcohol use: Not Currently     Comment: social none with pregnancy   • Drug use: No     Comment: denies self or   denies family use   • Sexual activity: Not Currently     Partners: Male     Birth control/protection: Coitus interruptus   Other Topics Concern   • None   Social History Narrative   • None     Social Determinants of Health     Financial Resource Strain: Not on file   Food Insecurity: Not on file   Transportation Needs: Not on file   Physical Activity: Not on file   Stress: Not on file   Social Connections: Not on file   Intimate Partner Violence: Not on file   Housing Stability: Not on file       O:   Wt 71 kg (156 lb 9.6 oz)   LMP 10/22/2022 (Exact Date)   Breastfeeding Yes   BMI 25.66 kg/m²     She appears well and is in no distress  Normocephalic, atraumatic. Abdomen is soft and nontender  : intact perineum with healing ecchymosis. No focal neurological deficits. Normal mood, affect, and behavior.

## 2023-10-04 ENCOUNTER — TELEPHONE (OUTPATIENT)
Dept: OBGYN CLINIC | Facility: CLINIC | Age: 33
End: 2023-10-04

## 2023-10-20 ENCOUNTER — OFFICE VISIT (OUTPATIENT)
Dept: CARDIOLOGY CLINIC | Facility: CLINIC | Age: 33
End: 2023-10-20
Payer: COMMERCIAL

## 2023-10-20 VITALS
HEART RATE: 63 BPM | SYSTOLIC BLOOD PRESSURE: 110 MMHG | WEIGHT: 157.5 LBS | DIASTOLIC BLOOD PRESSURE: 74 MMHG | BODY MASS INDEX: 25.81 KG/M2

## 2023-10-20 DIAGNOSIS — R00.0 SINUS TACHYCARDIA: Primary | ICD-10-CM

## 2023-10-20 PROCEDURE — 99213 OFFICE O/P EST LOW 20 MIN: CPT | Performed by: INTERNAL MEDICINE

## 2023-10-20 PROCEDURE — 93000 ELECTROCARDIOGRAM COMPLETE: CPT | Performed by: INTERNAL MEDICINE

## 2023-10-20 NOTE — PROGRESS NOTES
CARDIOLOGY OFFICE VISIT     PATIENT INFORMATION     Jelena Cook   26 y.o. female   MRN: [id number]    ASSESSMENT/PLAN     1. Palpitations  Pt originally presented to see me with palpitations during her fifth pregnancy. She was 27 weeks pregnant at the time. At that time she had sinus tachycardia on EKG. Echocardiogram was unremarkable. Holter monitor showed sinus tachycardia which correlated with her symptoms of palpitations along with some PACs and PVCs. Patient is now status post vaginal delivery of her fifth pregnancy. EKG: NSR. Pt denies any more palpitations. Overall feels well. Will follow up as needed. Was asked to call clinic if she has any new symptoms/palpitations. 2. HLD  Her total cholesterol and LDL has been persistently high for past few years. Last , nati 366. No family history of premature CAD. Was advised to try regular exercise and focus on Mediterranean diet. Patient would like to follow back up with PCP regarding this. As needed follow-up with me if needed in the future. There are no diagnoses linked to this encounter. Schedule a follow-up appointment prn. HEALTH MAINTENANCE     Immunization History   Administered Date(s) Administered    COVID-19 PFIZER VACCINE 0.3 ML IM 12/21/2020, 01/11/2021, 09/25/2021    Hep B, Adolescent or Pediatric 08/01/1993, 10/01/1993, 06/01/1994    INFLUENZA 01/01/2011, 09/18/2012, 01/22/2015    Influenza, injectable, quadrivalent, preservative free 0.5 mL 10/12/2018    MMR 07/01/2009    Td (adult), Unspecified 07/09/2009    Tdap 06/09/2014, 06/26/2014, 08/31/2018, 05/26/2023     CHIEF COMPLAINT     No chief complaint on file. HISTORY OF PRESENT ILLNESS     Today, pt is presenting as follow up after her delivery. Overall her delivery was uncomplicated. She overall admits to feeling well. She denies any more episodes of palpitations that she had during her pregnancy.   We  did EKG in the clinic and showed normal sinus rhythm. No chest pain or shortness of breath. 5/5/23: This is 26 yo female presenting with palpitations. She is 28 weeks pregnant. This is her 5th pregnancy. She has been having palpitations at rest and with walking. She admits that she has low exercise tolerance and has been having palpitations since week 20 of pregnancy. She has apple watch and noticed that her HR is high at rest and with walking. It is usually followed by SOB. Denies any chest pain. No sx and symptoms of volume overload. She is active and used to run and exercise before pregnancy. Her exercise tolerance has gone down for past 8 weeks. She had hx of dizziness during second pregnancy. Holter showed NSR, some PVCs and supraventricular ectopy. HR  BPM. No hx of lung disease. REVIEW OF SYSTEMS     Review of Systems   HENT:  Negative for congestion, sinus pressure, sinus pain and sore throat. Respiratory:  Negative for apnea, chest tightness, shortness of breath and stridor. Cardiovascular:  Negative for chest pain, palpitations and leg swelling. Gastrointestinal:  Negative for abdominal pain, constipation and diarrhea. OBJECTIVE     There were no vitals filed for this visit. Physical Exam  Vitals reviewed. Constitutional:       General: She is not in acute distress. Appearance: She is well-developed. She is not diaphoretic. Cardiovascular:      Rate and Rhythm: Normal rate and regular rhythm. Heart sounds: Normal heart sounds. No murmur heard. No friction rub. Pulmonary:      Effort: Pulmonary effort is normal. No respiratory distress. Breath sounds: Normal breath sounds. No stridor. Psychiatric:         Behavior: Behavior normal.         Thought Content:  Thought content normal.       CURRENT MEDICATIONS     Current Outpatient Medications:     acetaminophen (TYLENOL) 325 mg tablet, Take 2 tablets (650 mg total) by mouth every 4 (four) hours as needed for mild pain, Disp: , Rfl: 0    albuterol (PROVENTIL HFA,VENTOLIN HFA) 90 mcg/act inhaler, Inhale 2 puffs every 6 (six) hours as needed for wheezing, Disp: 8 g, Rfl: 2    benzocaine-menthol-lanolin-aloe (DERMOPLAST) 20-0.5 % topical spray, Apply 1 Application topically every 6 (six) hours as needed for mild pain (Patient not taking: Reported on 8/10/2023), Disp: , Rfl: 0    calcium carbonate (TUMS) 500 mg chewable tablet, Chew 2 tablets (1,000 mg total) daily as needed for indigestion or heartburn (Patient not taking: Reported on 8/10/2023), Disp: , Rfl: 0    docusate sodium (COLACE) 100 mg capsule, Take 1 capsule (100 mg total) by mouth 2 (two) times a day (Patient not taking: Reported on 8/10/2023), Disp: , Rfl: 0    hydrocortisone 1 % cream, Apply 1 Application topically daily as needed for irritation (Patient not taking: Reported on 8/10/2023), Disp: 30 g, Rfl: 0    ibuprofen (MOTRIN) 600 mg tablet, Take 1 tablet (600 mg total) by mouth every 6 (six) hours as needed for mild pain (Patient not taking: Reported on 8/10/2023), Disp: 30 tablet, Rfl: 0    Prenatal Vit-Fe Fumarate-FA (PRENATAL VITAMINS PO), Take by mouth, Disp: , Rfl:     witch hazel-glycerin (TUCKS) topical pad, Apply 1 Pad topically every 4 (four) hours as needed for irritation (Patient not taking: Reported on 8/10/2023), Disp: , Rfl: 0    PAST MEDICAL & SURGICAL HISTORY     Past Medical History:   Diagnosis Date    Anemia     mild anemia with prev pregnancy 2018    Anorexia     resolved    Anxiety     Asthma     hx of asthma    Headache(784.0)     Migraine     As Child/Resolved    Normal delivery     2013 daughter, 2014 daughter, 2016 daughter    PONV (postoperative nausea and vomiting)     Varicella     As child     Past Surgical History:   Procedure Laterality Date    ARM SKIN LESION BIOPSY / EXCISION Right     MYRINGOTOMY W/ TUBES      WISDOM TOOTH EXTRACTION       SOCIAL & FAMILY HISTORY     Social History     Socioeconomic History    Marital status: /Civil Union     Spouse name: Not on file    Number of children: Not on file    Years of education: Not on file    Highest education level: Not on file   Occupational History    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   Tobacco Use    Smoking status: Never    Smokeless tobacco: Never   Vaping Use    Vaping Use: Never used   Substance and Sexual Activity    Alcohol use: Not Currently     Comment: social none with pregnancy    Drug use: No     Comment: denies self or   denies family use    Sexual activity: Not Currently     Partners: Male     Birth control/protection: Coitus interruptus   Other Topics Concern    Not on file   Social History Narrative    Not on file     Social Determinants of Health     Financial Resource Strain: Not on file   Food Insecurity: Not on file   Transportation Needs: Not on file   Physical Activity: Not on file   Stress: Not on file   Social Connections: Not on file   Intimate Partner Violence: Not on file   Housing Stability: Not on file     Social History     Substance and Sexual Activity   Alcohol Use Not Currently    Comment: social none with pregnancy           Substance and Sexual Activity   Drug Use No    Comment: denies self or   denies family use     Social History     Tobacco Use   Smoking Status Never   Smokeless Tobacco Never     Family History   Problem Relation Age of Onset    Hypertension Mother     Uterine cancer Mother 48    Hypothyroidism Mother     Ankylosing spondylitis Mother     Asthma Mother     Cancer Mother         Uterine    Thyroid disease Mother     Cancer Father         Prostate    Prostate cancer Father     No Known Problems Sister     No Known Problems Sister     No Known Problems Brother     No Known Problems Daughter     No Known Problems Daughter     No Known Problems Daughter     No Known Problems Daughter     Diabetes Maternal Grandmother     Stroke Maternal Grandmother     Cancer Maternal Grandfather         lung    Lung cancer Maternal Grandfather     Diabetes Maternal Grandfather     Heart disease Paternal Grandfather     Breast cancer Neg Hx     Colon cancer Neg Hx     Ovarian cancer Neg Hx     Cervical cancer Neg Hx      ==  Brendon Rios, DO  PGY-5  CARDIOLOGY FELLOW

## 2023-10-20 NOTE — PATIENT INSTRUCTIONS
Hyperlipidemia   AMBULATORY CARE:   Hyperlipidemia  is a high level of lipids (fats) in your blood. These lipids include cholesterol or triglycerides. Lipids are made by your body. They also come from the foods you eat. Your body needs lipids to work properly, but high levels increase your risk for heart disease, heart attack, and stroke. Call your local emergency number (911 in the [address]) or have someone call if:   You have any of the following signs of a heart attack:      Squeezing, pressure, or pain in your chest    You may  also have any of the following:     Discomfort or pain in your back, neck, jaw, stomach, or arm    Shortness of breath    Nausea or vomiting    Lightheadedness or a sudden cold sweat    You have any of the following signs of a stroke:      Numbness or drooping on one side of your face     Weakness in an arm or leg    Confusion or difficulty speaking    Dizziness, a severe headache, or vision loss    Call your doctor if:   You have questions or concerns about your condition or care. Treatment  may first include lifestyle changes to help decrease your lipid levels. Your provider may recommend you work with a team to manage hyperlipidemia. The team may include medical experts such as a dietitian, an exercise or physical therapist, and a behavior therapist. Your family members may be included in helping you create lifestyle changes. You may also need to take medicine to lower your lipid levels. Some of the lifestyle changes you may need to make include the following:  Maintain a healthy weight. Ask your healthcare provider what a healthy weight is for you. Ask your provider to help you create a weight loss plan, if needed. Weight loss can decrease your cholesterol and triglyceride levels. Be physically active throughout the day. Physical activity, such as exercise, lowers your cholesterol levels and helps you maintain a healthy weight.  Get 30 minutes or more of aerobic exercise 4 to 6 days each week. You can split your exercise into four 10-minute workouts instead of 30 minutes at one time. Examples of aerobic exercises include walking briskly, swimming, or riding a bike. Work with your healthcare provider to plan the best exercise program for you. Also include strength training at least 2 times each week. Your healthcare providers can help you create a physical activity plan. Do not smoke. Nicotine and other chemicals in cigarettes and cigars can increase your risk for a heart attack and stroke. Ask your healthcare provider for information if you currently smoke and need help to quit. E-cigarettes or smokeless tobacco still contain nicotine. Talk to your healthcare provider before you use these products. Eat heart-healthy foods. A dietitian or your provider can give you more information on low-sodium plans or the DASH (Dietary Approaches to Stop Hypertension) eating plan. The DASH plan is low in sodium, processed sugar, unhealthy fats, and total fat. It is high in potassium, calcium, and fiber. It is high in potassium, calcium, and fiber. These can be found in vegetables, fruit, and whole-grain foods. The following are ways to get more heart-healthy foods:         Decrease the total amount of fat you eat. Choose lean meats, fat-free or 1% fat milk, and low-fat dairy products, such as yogurt and cheese. Limit or do not eat red meat. Red meats are high in fat and cholesterol. Replace unhealthy fats with healthy fats. Unhealthy fats include saturated fat, trans fat, and cholesterol. Choose soft margarines that are low in saturated fat and have little or no trans fat. Monounsaturated fats are healthy fats. These are found in olive oil, canola oil, avocado, and nuts. Polyunsaturated fats are also healthy. These are found in fish, flaxseed, walnuts, and soybeans. Eat 5 or more servings of fruits and vegetables every day.   They are low in calories and fat and a good source of essential vitamins. Include dark green, red, and orange vegetables. Examples include spinach, kale, broccoli, and carrots. Eat foods high in fiber. Fiber can help lower your cholesterol levels. Choose whole grain, high-fiber foods. Good choices include whole-wheat breads or cereals, beans, peas, fruits, and vegetables. Limit sodium (salt) as directed. Too much sodium can affect your fluid balance and blood pressure. Your healthcare provider will tell you how much sodium and potassium are safe for you to have in a day. Your provider may recommend that you limit sodium to 2,300 mg a day. Your provider or a dietitian can help you find ways to limit sodium. For example, if you add salt while you cook, do not add more salt at the table. Check labels to find low-sodium or no-salt-added foods. Some low-sodium foods use potassium salts for flavor. Too much potassium can also cause health problems. Ask your healthcare provider if it is okay for you to drink alcohol. Alcohol can increase your cholesterol and triglyceride levels. Your provider can tell you how many drinks are okay to have within 24 hours and within 1 week. A drink of alcohol is 12 ounces of beer, 5 ounces of wine, or 1½ ounces of liquor. Follow up with your doctor as directed: You may need to return for more tests. Your healthcare provider may refer you to a dietitian. Write down your questions so you remember to ask them during your visits. © Copyright [employer] 2023 Information is for End User's use only and may not be sold, redistributed or otherwise used for commercial purposes. The above information is an  only. It is not intended as medical advice for individual conditions or treatments. Talk to your doctor, nurse or pharmacist before following any medical regimen to see if it is safe and effective for you.

## 2023-10-26 ENCOUNTER — ANNUAL EXAM (OUTPATIENT)
Dept: OBGYN CLINIC | Facility: CLINIC | Age: 33
End: 2023-10-26
Payer: COMMERCIAL

## 2023-10-26 VITALS
DIASTOLIC BLOOD PRESSURE: 64 MMHG | HEIGHT: 66 IN | WEIGHT: 154 LBS | SYSTOLIC BLOOD PRESSURE: 110 MMHG | BODY MASS INDEX: 24.75 KG/M2

## 2023-10-26 DIAGNOSIS — Z01.419 ENCNTR FOR GYN EXAM (GENERAL) (ROUTINE) W/O ABN FINDINGS: Primary | ICD-10-CM

## 2023-10-26 DIAGNOSIS — Z11.51 SCREENING FOR HUMAN PAPILLOMAVIRUS (HPV): ICD-10-CM

## 2023-10-26 PROBLEM — D50.9 IRON DEFICIENCY ANEMIA OF PREGNANCY: Status: RESOLVED | Noted: 2023-05-11 | Resolved: 2023-10-26

## 2023-10-26 PROBLEM — O99.019 IRON DEFICIENCY ANEMIA OF PREGNANCY: Status: RESOLVED | Noted: 2023-05-11 | Resolved: 2023-10-26

## 2023-10-26 PROBLEM — Z3A.38 38 WEEKS GESTATION OF PREGNANCY: Status: RESOLVED | Noted: 2023-05-26 | Resolved: 2023-10-26

## 2023-10-26 PROBLEM — O09.523 MULTIGRAVIDA OF ADVANCED MATERNAL AGE IN THIRD TRIMESTER: Status: RESOLVED | Noted: 2023-02-15 | Resolved: 2023-10-26

## 2023-10-26 PROBLEM — Z34.82 PRENATAL CARE, SUBSEQUENT PREGNANCY IN SECOND TRIMESTER: Status: RESOLVED | Noted: 2023-02-15 | Resolved: 2023-10-26

## 2023-10-26 PROBLEM — J02.9 SORE THROAT: Status: RESOLVED | Noted: 2023-07-17 | Resolved: 2023-10-26

## 2023-10-26 PROCEDURE — G0476 HPV COMBO ASSAY CA SCREEN: HCPCS | Performed by: OBSTETRICS & GYNECOLOGY

## 2023-10-26 PROCEDURE — G0145 SCR C/V CYTO,THINLAYER,RESCR: HCPCS | Performed by: OBSTETRICS & GYNECOLOGY

## 2023-10-26 PROCEDURE — S0612 ANNUAL GYNECOLOGICAL EXAMINA: HCPCS | Performed by: OBSTETRICS & GYNECOLOGY

## 2023-10-26 NOTE — PROGRESS NOTES
Ignacio Negron  1990      CC:  Yearly exam    S:  26 y.o. female here for yearly exam. Her cycles are absent since delivery which is odd for her. She has previously had resumption of her menses at ~8 weeks postpartum with her other children despite exclusive breastfeeding. She denies the possibility of pregnancy. She also has a little bit of spotting on and off so she thought her period was coming, but nothing yet. Discussed expectations. Sexual activity: She is sexually active without pain, bleeding or dryness. Contraception: She uses withdrawal for contraception. Last Pap 6/5/2019 - normal/negative HPV  Last Mammo 8/6/2021 - BIRAD-2    [place] score = 4. We reviewed ASCCP guidelines for Pap testing today.      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   Tobacco Use    Smoking status: Never    Smokeless tobacco: Never   Vaping Use    Vaping Use: Never used   Substance and Sexual Activity    Alcohol use: Not Currently     Comment: rare- BF    Drug use: No     Comment: denies self or   denies family use    Sexual activity: Not Currently     Partners: Male     Birth control/protection: Coitus interruptus   Other Topics Concern    Not on file   Social History Narrative    Not on file     Social Determinants of Health     Financial Resource Strain: Not on file   Food Insecurity: Not on file   Transportation Needs: Not on file   Physical Activity: Not on file   Stress: Not on file   Social Connections: Not on file   Intimate Partner Violence: Not on file   Housing Stability: Not on file     Family History   Problem Relation Age of Onset    Hypertension Mother     Uterine cancer Mother 48    Hypothyroidism Mother     Ankylosing spondylitis Mother     Asthma Mother     Cancer Mother         Uterine    Thyroid disease Mother     Cancer Father         Prostate    Prostate cancer Father     No Known Problems Sister     No Known Problems Sister     No Known Problems Brother     No Known Problems Daughter     No Known Problems Daughter     No Known Problems Daughter     No Known Problems Daughter     Diabetes Maternal Grandmother     Stroke Maternal Grandmother     Cancer Maternal Grandfather         lung    Lung cancer Maternal Grandfather     Diabetes Maternal Grandfather     Heart disease Paternal Grandfather     Breast cancer Neg Hx     Colon cancer Neg Hx     Ovarian cancer Neg Hx     Cervical cancer Neg Hx       Past Medical History:   Diagnosis Date    Anemia     mild anemia with prev pregnancy 2018    Anorexia     resolved    Anxiety     Asthma     hx of asthma    Headache(784.0)     Migraine     As Child/Resolved    Normal delivery     2013 daughter, 2014 daughter, 2016 daughter    PONV (postoperative nausea and vomiting)     Varicella     As child        Review of Systems   Respiratory: Negative. Cardiovascular: Negative. Gastrointestinal: Negative for constipation and diarrhea. Genitourinary: Negative for difficulty urinating, pelvic pain, vaginal bleeding, vaginal discharge, itching or odor. O:  Blood pressure 110/64, height 5' 6" (1.676 m), weight 69.9 kg (154 lb), currently breastfeeding. Patient appears well and is not in distress  Neck is supple without masses  Breasts are symmetrical without mass, tenderness, nipple discharge, skin changes or adenopathy. Abdomen is soft and nontender without masses. External genitals are normal without lesions or rashes. Urethral meatus and urethra are normal  Bladder is normal to palpation  Vagina is normal without discharge or bleeding. Cervix is normal without discharge or lesion. Uterus is normal, mobile, nontender without palpable mass. Adnexa are normal, nontender, without palpable mass. A:   Yearly exam.     P:   Pap with HPV done - will call with results   Mammo at age 34    RTO one year for yearly exam or sooner as needed.

## 2023-11-02 LAB
LAB AP GYN PRIMARY INTERPRETATION: NORMAL
Lab: NORMAL

## 2024-01-05 ENCOUNTER — TELEPHONE (OUTPATIENT)
Age: 34
End: 2024-01-05

## 2024-01-05 ENCOUNTER — OFFICE VISIT (OUTPATIENT)
Dept: INTERNAL MEDICINE CLINIC | Facility: CLINIC | Age: 34
End: 2024-01-05
Payer: COMMERCIAL

## 2024-01-05 VITALS
SYSTOLIC BLOOD PRESSURE: 109 MMHG | BODY MASS INDEX: 23.63 KG/M2 | HEART RATE: 93 BPM | WEIGHT: 146.4 LBS | TEMPERATURE: 97.3 F | DIASTOLIC BLOOD PRESSURE: 72 MMHG | OXYGEN SATURATION: 99 %

## 2024-01-05 DIAGNOSIS — J01.00 ACUTE NON-RECURRENT MAXILLARY SINUSITIS: Primary | ICD-10-CM

## 2024-01-05 PROCEDURE — 99213 OFFICE O/P EST LOW 20 MIN: CPT | Performed by: INTERNAL MEDICINE

## 2024-01-05 RX ORDER — AMOXICILLIN 500 MG/1
500 CAPSULE ORAL EVERY 8 HOURS SCHEDULED
Qty: 21 CAPSULE | Refills: 0 | Status: SHIPPED | OUTPATIENT
Start: 2024-01-05 | End: 2024-01-12

## 2024-01-05 NOTE — PATIENT INSTRUCTIONS
Problem List Items Addressed This Visit          Respiratory    Acute non-recurrent maxillary sinusitis - Primary     I recommend starting over-the-counter Flonase as a nasal steroid to help with any congestion and facial pressure, prescription for antibiotics also given to start in a couple days if not improving amoxicillin sent.  Breast-feeding compatibility discussed with patient with medications, I can also just try nasal saline or Seffner pot rinse to help with the congestion.         Relevant Medications    amoxicillin (AMOXIL) 500 mg capsule

## 2024-01-05 NOTE — PROGRESS NOTES
Name: Zhanna Torres      : 1990      MRN: [id number]  Encounter Provider: Edwar Barclay MD  Encounter Date: 2024   Encounter department: [facility]    Assessment & Plan     1. Acute non-recurrent maxillary sinusitis  Assessment & Plan:  I recommend starting over-the-counter Flonase as a nasal steroid to help with any congestion and facial pressure, prescription for antibiotics also given to start in a couple days if not improving amoxicillin sent.  Breast-feeding compatibility discussed with patient with medications, I can also just try nasal saline or [place] pot rinse to help with the congestion.    Orders:  -     amoxicillin (AMOXIL) 500 mg capsule; Take 1 capsule (500 mg total) by mouth every 8 (eight) hours for 7 days           Subjective     Onset of symptoms 2 days ago of headache, then developed fever, congestion, earache.    Headache  Fever  Associated symptoms include chills, a fever and headaches. Pertinent negatives include no arthralgias, chest pain, coughing, myalgias or sore throat.   Ear Fullness   Associated symptoms include headaches. Pertinent negatives include no coughing or sore throat.     Review of Systems   Constitutional:  Positive for chills and fever.   HENT:  Positive for ear pain, sinus pressure, sinus pain and tinnitus. Negative for sore throat.    Respiratory:  Negative for cough and shortness of breath.    Cardiovascular:  Negative for chest pain.   Musculoskeletal:  Negative for arthralgias and myalgias.   Neurological:  Positive for headaches.       Past Medical History:   Diagnosis Date   • Anemia     mild anemia with prev pregnancy 2018   • Anorexia     resolved   • Anxiety    • Asthma     hx of asthma   • Headache(784.0)    • Migraine     As Child/Resolved   • Normal delivery     2013 daughter, 2014 daughter, 2016 daughter   • PONV (postoperative nausea and vomiting)    • Varicella     As child     Past Surgical History:   Procedure  Laterality Date   • ARM SKIN LESION BIOPSY / EXCISION Right    • MYRINGOTOMY W/ TUBES     • WISDOM TOOTH EXTRACTION       Family History   Problem Relation Age of Onset   • Hypertension Mother    • Uterine cancer Mother 50   • Hypothyroidism Mother    • Ankylosing spondylitis Mother    • Asthma Mother    • Cancer Mother         Uterine   • Thyroid disease Mother    • Cancer Father         Prostate   • Prostate cancer Father 58   • No Known Problems Sister    • No Known Problems Sister    • No Known Problems Brother    • No Known Problems Daughter    • No Known Problems Daughter    • No Known Problems Daughter    • No Known Problems Daughter    • Diabetes Maternal Grandmother    • Stroke Maternal Grandmother    • Cancer Maternal Grandfather         lung   • Lung cancer Maternal Grandfather    • Diabetes Maternal Grandfather    • Heart disease Paternal Grandfather    • Breast cancer Neg Hx    • Colon cancer Neg Hx    • Ovarian cancer Neg Hx    • Cervical cancer Neg Hx      Social History     Socioeconomic History   • Marital status: /Civil Union     Spouse name: None   • Number of children: None   • Years of education: None   • Highest education level: None   Occupational History   • 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   Tobacco Use   • Smoking status: Never   • Smokeless tobacco: Never   Vaping Use   • Vaping status: Never Used   Substance and Sexual Activity   • Alcohol use: Not Currently     Comment: rare- BF   • Drug use: No     Comment: denies self or   denies family use   • Sexual activity: Not Currently     Partners: Male     Birth control/protection: Coitus interruptus   Other Topics Concern   • None   Social History Narrative   • None     Social Determinants of Health     Financial Resource Strain: Not on file   Food Insecurity: Not on file   Transportation Needs: Not on file   Physical Activity: Not on file   Stress: Not on file   Social Connections:  Not on file   Intimate Partner Violence: Not on file   Housing Stability: Not on file     Current Outpatient Medications on File Prior to Visit   Medication Sig   • Prenatal Vit-Fe Fumarate-FA (PRENATAL VITAMINS PO) Take by mouth   • albuterol (PROVENTIL HFA,VENTOLIN HFA) 90 mcg/act inhaler Inhale 2 puffs every 6 (six) hours as needed for wheezing (Patient not taking: Reported on 10/20/2023)     No Known Allergies  Immunization History   Administered Date(s) Administered   • COVID-19 PFIZER VACCINE 0.3 ML IM 12/21/2020, 01/11/2021, 09/25/2021   • Hep B, Adolescent or Pediatric 08/01/1993, 10/01/1993, 06/01/1994   • INFLUENZA 01/01/2011, 09/18/2012, 01/22/2015   • Influenza, injectable, quadrivalent, preservative free 0.5 mL 10/12/2018   • MMR 07/01/2009   • Td (adult), Unspecified 07/09/2009   • Tdap 06/09/2014, 06/26/2014, 08/31/2018, 05/26/2023       Objective     /72   Pulse 93   Temp (!) 97.3 °F (36.3 °C) (Tympanic)   Wt 66.4 kg (146 lb 6.4 oz)   SpO2 99%   BMI 23.63 kg/m²     Physical Exam  Constitutional:       General: She is not in acute distress.     Appearance: Normal appearance.   HENT:      Right Ear: Tympanic membrane, ear canal and external ear normal. There is no impacted cerumen.      Left Ear: Tympanic membrane, ear canal and external ear normal. There is no impacted cerumen.      Mouth/Throat:      Mouth: Mucous membranes are moist.      Pharynx: No oropharyngeal exudate or posterior oropharyngeal erythema.   Pulmonary:      Effort: Pulmonary effort is normal. No respiratory distress.      Breath sounds: Normal breath sounds. No wheezing or rhonchi.   Lymphadenopathy:      Cervical: No cervical adenopathy.   Neurological:      Mental Status: She is alert.       Edwar Barclay MD

## 2024-01-05 NOTE — TELEPHONE ENCOUNTER
Patient called in stating that she has been experiencing headache, facial pressure, nasal congestion, and fever as of yesterday. She did not state what her temp was. She denies body aches, chills, NVD, SOB, chest pain, sore throat, cough.     Warm transfer to office clerical to assist with scheduling. Was asked to send an encounter to office clinical to forward to PCP for further evaluation. She is asking for an appointment with any provider today.     Preferred pharmacy: [facility].

## 2024-01-05 NOTE — ASSESSMENT & PLAN NOTE
I recommend starting over-the-counter Flonase as a nasal steroid to help with any congestion and facial pressure, prescription for antibiotics also given to start in a couple days if not improving amoxicillin sent.  Breast-feeding compatibility discussed with patient with medications, I can also just try nasal saline or [place] pot rinse to help with the congestion.

## 2024-02-08 ENCOUNTER — OFFICE VISIT (OUTPATIENT)
Dept: INTERNAL MEDICINE CLINIC | Facility: CLINIC | Age: 34
End: 2024-02-08
Payer: COMMERCIAL

## 2024-02-08 VITALS
WEIGHT: 146 LBS | DIASTOLIC BLOOD PRESSURE: 76 MMHG | HEIGHT: 65 IN | SYSTOLIC BLOOD PRESSURE: 120 MMHG | BODY MASS INDEX: 24.32 KG/M2

## 2024-02-08 DIAGNOSIS — J02.9 SORE THROAT: Primary | ICD-10-CM

## 2024-02-08 LAB — S PYO DNA THROAT QL NAA+PROBE: NOT DETECTED

## 2024-02-08 PROCEDURE — 87651 STREP A DNA AMP PROBE: CPT | Performed by: INTERNAL MEDICINE

## 2024-02-08 PROCEDURE — 99213 OFFICE O/P EST LOW 20 MIN: CPT | Performed by: INTERNAL MEDICINE

## 2024-02-08 NOTE — ASSESSMENT & PLAN NOTE
-Meets 2 of 4 Centor criteria in addition to having several family members at home with strep throat.   -Administer a saline nasal spray as needed for sinus congestion.  -Take Tylenol as needed for pain and/or fever.  -Perform daily warm salt water gargles for sore throat.  -In office rapid strep (-)

## 2024-02-08 NOTE — PROGRESS NOTES
"Name: Zhanna Torres      : 1990      MRN: [id number]  Encounter Provider: Rik Moore MD  Encounter Date: 2024   [facility] department: [facility]    Assessment & Plan     1. Sore throat  Assessment & Plan:  -Meets 2 of 4 Centor criteria in addition to having several family members at home with strep throat.   -Administer a saline nasal spray as needed for sinus congestion.  -Take Tylenol as needed for pain and/or fever.  -Perform daily warm salt water gargles for sore throat.  -In office rapid strep (-)     Orders:  -     POCT rapid PCR strepA         Subjective      HPI  Patient presents today as an acute visit complaining of sore throat over the past 2 days.  She also endorses postnasal drip, hoarseness and bodyaches.  She denies any fevers, chills, shortness of breath or wheezing.  Of note she states 4 out of her 5 children currently have strep throat.    All other systems negative except for pertinent findings noted in HPI.       Current Outpatient Medications on File Prior to Visit   Medication Sig   • albuterol (PROVENTIL HFA,VENTOLIN HFA) 90 mcg/act inhaler Inhale 2 puffs every 6 (six) hours as needed for wheezing (Patient not taking: Reported on 10/20/2023)   • Prenatal Vit-Fe Fumarate-FA (PRENATAL VITAMINS PO) Take by mouth       Objective     /76 (BP Location: Left arm, Patient Position: Sitting, Cuff Size: Standard)   Ht 5' 5\" (1.651 m)   Wt 66.2 kg (146 lb)   BMI 24.30 kg/m²     BP Readings from Last 3 Encounters:   24 120/76   24 109/72   10/26/23 110/64        Wt Readings from Last 3 Encounters:   24 66.2 kg (146 lb)   24 66.4 kg (146 lb 6.4 oz)   10/26/23 69.9 kg (154 lb)       Physical Exam    General: NAD  HEENT: NCAT, EOMI, normal conjunctiva, cervical LAD  Cardiovascular: RRR, normal S1 and S2, no m/r/g  Pulmonary: Normal respiratory effort, no wheezes, rales or rhonchi  Extremities: No lower extremity edema  Skin: " Normal skin color, no rashes     Rik Moore MD

## 2024-02-08 NOTE — PATIENT INSTRUCTIONS
-Administer a saline nasal spray as needed for sinus congestion  -Take Tylenol as needed for pain and/or fever  -Perform daily warm salt water gargles for sore throat

## 2024-02-21 PROBLEM — J01.00 ACUTE NON-RECURRENT MAXILLARY SINUSITIS: Status: RESOLVED | Noted: 2024-01-05 | Resolved: 2024-02-21

## 2024-03-21 NOTE — PROGRESS NOTES
Subjective:       Patient ID: Kodak Devries is a 44 y.o. male.    Chief Complaint: Hospital Follow Up (Admitted due to Fluid.)    HPI      Pt seen in ED due to SOB- found to have chf exacebation, hypoxia, severe sleep apnea    New onset acute hypoxia.now on 2 L contin o2   worsening JOY- on bipap  Chf -Now on lasix BID  Here with mother. States he is doing a lot better now.      Past Medical History:   Diagnosis Date    Down's syndrome     Gout     Gout flare     Hypothyroidism     Morbid obesity 1/15/2020     Past Surgical History:   Procedure Laterality Date    CARDIAC SURGERY      Child     Social History     Socioeconomic History    Marital status: Single   Tobacco Use    Smoking status: Never    Smokeless tobacco: Never   Substance and Sexual Activity    Alcohol use: Yes     Comment: Occ beer    Drug use: No     Review of patient's allergies indicates:  No Known Allergies  Current Outpatient Medications   Medication Sig    allopurinoL (ZYLOPRIM) 300 MG tablet Take 1 tablet (300 mg total) by mouth once daily.    furosemide (LASIX) 40 MG tablet Take 1 tablet (40 mg total) by mouth once daily.    levothyroxine (SYNTHROID) 200 MCG tablet Take 1 tablet (200 mcg total) by mouth before breakfast.    albuterol (PROVENTIL/VENTOLIN HFA) 90 mcg/actuation inhaler Inhale 2 puffs into the lungs every 6 (six) hours as needed for Wheezing or Shortness of Breath (or Cough). (Patient not taking: Reported on 3/21/2024)    Lactobacillus rhamnosus GG (CULTURELLE) 10 billion cell capsule Take 1 capsule by mouth once daily.     No current facility-administered medications for this visit.           Review of Systems   Constitutional:  Negative for activity change, appetite change, chills, diaphoresis, fatigue, fever and unexpected weight change.   HENT:  Negative for congestion, ear pain, postnasal drip, rhinorrhea, sinus pressure, sinus pain, sneezing, sore throat, tinnitus, trouble swallowing and voice change.    Eyes:  Negative  Problem List Items Addressed This Visit        Other    34 weeks gestation of pregnancy     Amish Lopez is doing well  Baby is active  Denies bleeding, LOF    + BH contractions, nothing concerning  TWG 35#  Growth 32wks - 86%, she thinks this baby feels about same size as others   is planning vasectomy  Is leaning toward 39wk IOL           Other Visit Diagnoses     Prenatal care, subsequent pregnancy, third trimester    -  Primary for photophobia, pain and visual disturbance.   Respiratory:  Positive for shortness of breath (improved). Negative for cough, chest tightness and wheezing.    Cardiovascular:  Negative for chest pain, palpitations and leg swelling.   Gastrointestinal:  Negative for abdominal distention, abdominal pain, constipation, diarrhea, nausea and vomiting.   Genitourinary:  Negative for decreased urine volume, difficulty urinating, dysuria, flank pain, frequency, hematuria and urgency.   Musculoskeletal:  Negative for arthralgias, back pain, joint swelling, neck pain and neck stiffness.   Allergic/Immunologic: Negative for immunocompromised state.   Neurological:  Negative for dizziness, tremors, seizures, syncope, facial asymmetry, speech difficulty, weakness, light-headedness, numbness and headaches.   Hematological:  Negative for adenopathy. Does not bruise/bleed easily.   Psychiatric/Behavioral:  Negative for confusion and sleep disturbance.        Objective:      Physical Exam  Vitals reviewed.   Constitutional:       Appearance: He is obese.   Cardiovascular:      Rate and Rhythm: Normal rate and regular rhythm.      Heart sounds: Normal heart sounds.   Pulmonary:      Effort: Pulmonary effort is normal.      Breath sounds: Normal breath sounds.      Comments: On 2 l nc   Musculoskeletal:      Cervical back: Normal range of motion and neck supple.   Skin:     General: Skin is warm and dry.   Neurological:      Mental Status: He is alert and oriented to person, place, and time.         Assessment:     Vitals:    03/21/24 1422   BP: 118/68   Pulse: 83   Resp: 17   Temp: 98.1 °F (36.7 °C)         1. Acute on chronic respiratory failure with hypoxia    2. Requires continuous at home supplemental oxygen    3. JOY treated with BiPAP    4. Pulmonary hypertension    5. Heart failure, unspecified HF chronicity, unspecified heart failure type        Plan:   Acute on chronic respiratory failure with hypoxia  -     Ambulatory  referral/consult to Pulmonology; Future; Expected date: 03/28/2024    Requires continuous at home supplemental oxygen    JOY treated with BiPAP  -     Ambulatory referral/consult to Pulmonology; Future; Expected date: 03/28/2024    Pulmonary hypertension  -     Ambulatory referral/consult to Pulmonology; Future; Expected date: 03/28/2024    Heart failure, unspecified HF chronicity, unspecified heart failure type  -     Ambulatory referral/consult to Cardiology; Future; Expected date: 03/28/2024      See pulm/cards  Continue lasix, o2, bipap  Pcp upcoming

## 2024-04-17 ENCOUNTER — OFFICE VISIT (OUTPATIENT)
Dept: INTERNAL MEDICINE CLINIC | Facility: CLINIC | Age: 34
End: 2024-04-17
Payer: COMMERCIAL

## 2024-04-17 VITALS
SYSTOLIC BLOOD PRESSURE: 122 MMHG | OXYGEN SATURATION: 99 % | WEIGHT: 144.2 LBS | HEART RATE: 73 BPM | DIASTOLIC BLOOD PRESSURE: 66 MMHG | BODY MASS INDEX: 24.03 KG/M2 | HEIGHT: 65 IN

## 2024-04-17 DIAGNOSIS — Z13.0 SCREENING, ANEMIA, DEFICIENCY, IRON: ICD-10-CM

## 2024-04-17 DIAGNOSIS — Z13.1 SCREENING FOR DIABETES MELLITUS: Primary | ICD-10-CM

## 2024-04-17 DIAGNOSIS — J45.20 MILD INTERMITTENT ASTHMA WITHOUT COMPLICATION: ICD-10-CM

## 2024-04-17 DIAGNOSIS — E55.9 VITAMIN D DEFICIENCY: ICD-10-CM

## 2024-04-17 DIAGNOSIS — Z00.00 WELLNESS EXAMINATION: ICD-10-CM

## 2024-04-17 DIAGNOSIS — Z13.6 SCREENING FOR CARDIOVASCULAR CONDITION: ICD-10-CM

## 2024-04-17 DIAGNOSIS — Z13.29 SCREENING FOR THYROID DISORDER: ICD-10-CM

## 2024-04-17 PROCEDURE — 99395 PREV VISIT EST AGE 18-39: CPT | Performed by: INTERNAL MEDICINE

## 2024-04-17 RX ORDER — ALBUTEROL SULFATE 90 UG/1
2 AEROSOL, METERED RESPIRATORY (INHALATION) EVERY 6 HOURS PRN
Qty: 8 G | Refills: 2 | Status: SHIPPED | OUTPATIENT
Start: 2024-04-17

## 2024-04-17 NOTE — PROGRESS NOTES
ADULT ANNUAL PHYSICAL  [facility]    NAME: Zhanna Torres  AGE: 33 y.o. SEX: female  : 1990     DATE: 2024     Assessment and Plan:     Problem List Items Addressed This Visit        Respiratory    Asthma     Clinically stable and doing well continue the current medical regiment will continue monitor.         Relevant Medications    albuterol (PROVENTIL HFA,VENTOLIN HFA) 90 mcg/act inhaler       Other    Vitamin D deficiency    Relevant Orders    Vitamin D 25 hydroxy    Wellness examination     Assessment and plan 1.  Health maintenance annual wellness examination overall the patient is clinically stable and doing well, we encouraged the patient to follow a healthy and balanced diet.  We recommend that the patient exercise routinely approximately 30 minutes 5 times per week .  We have reviewed the patient's vaccines and have made recommendations for updates if necessary    annual flu shot.  We will be ordering screening laboratories which are age appropriate.  Return to the office in 1 year    call if any problems.        Other Visit Diagnoses     Screening for diabetes mellitus    -  Primary    Relevant Orders    Comprehensive metabolic panel    Hemoglobin A1C    Screening for cardiovascular condition        Relevant Orders    Lipid Panel with Direct LDL reflex    Screening for thyroid disorder        Relevant Orders    TSH, 3rd generation    Screening, anemia, deficiency, iron        Relevant Orders    CBC (Includes Diff/Plt) (Refl)    Iron Panel (Includes Ferritin, Iron Sat%, Iron, and TIBC)        RTO in 1 year call if any problems    Immunizations and preventive care screenings were discussed with patient today. Appropriate education was printed on patient's after visit summary.    Counseling:  Exercise: the importance of regular exercise/physical activity was discussed. Recommend exercise 3-5 times per week for at least 30 minutes.        Depression Screening and Follow-up Plan: Patient was screened for depression during today's encounter. They screened negative with a PHQ-2 score of 0.        Return in about 1 year (around 4/17/2025).     Chief Complaint:     Chief Complaint   Patient presents with   • Physical Exam      History of Present Illness:     Adult Annual Physical   Patient here for a comprehensive physical exam. The patient reports no problems.    Diet and Physical Activity  Diet/Nutrition: well balanced diet.   Exercise: walking.      Depression Screening  PHQ-2/9 Depression Screening    Little interest or pleasure in doing things: 0 - not at all  Feeling down, depressed, or hopeless: 0 - not at all  PHQ-2 Score: 0  PHQ-2 Interpretation: Negative depression screen       General Health  Sleep: gets 4-6 hours of sleep on average.  Tinnitus chronic   Hearing: normal - bilateral.  Vision: no vision problems. Oph Dr Francois got new prescription for driving   Dental: regular dental visits.       /GYN Health  Follows with gynecology? yes   Last menstrual period: April 1  Contraceptive method:   History of STDs?: no.     Advanced Care Planning  Do you have an advanced directive? yes  Do you have a durable medical power of ? yes  ACP document given to the patient? no      Review of Systems:     Review of Systems   Constitutional:  Negative for activity change, appetite change and unexpected weight change.   HENT:  Negative for congestion and postnasal drip.    Eyes:  Negative for visual disturbance.   Respiratory:  Negative for cough and shortness of breath.    Cardiovascular:  Negative for chest pain.   Gastrointestinal:  Negative for abdominal pain, diarrhea, nausea and vomiting.   Neurological:  Negative for dizziness, light-headedness and headaches.   Hematological:  Negative for adenopathy.      Past Medical History:     Past Medical History:   Diagnosis Date   • Anemia     mild anemia with prev pregnancy 2018   • Anorexia      resolved   • Anxiety    • Asthma     hx of asthma   • Headache(784.0)    • Migraine     As Child/Resolved   • Normal delivery     2013 daughter, 2014 daughter, 2016 daughter   • PONV (postoperative nausea and vomiting)    • Varicella     As child      Past Surgical History:     Past Surgical History:   Procedure Laterality Date   • ARM SKIN LESION BIOPSY / EXCISION Right    • MYRINGOTOMY W/ TUBES     • WISDOM TOOTH EXTRACTION        Social History:     Social History     Socioeconomic History   • Marital status: /Civil Union     Spouse name: None   • Number of children: None   • Years of education: None   • Highest education level: None   Occupational History   • 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   Tobacco Use   • Smoking status: Never   • Smokeless tobacco: Never   Vaping Use   • Vaping status: Never Used   Substance and Sexual Activity   • Alcohol use: Not Currently     Comment: rare- BF   • Drug use: No     Comment: denies self or   denies family use   • Sexual activity: Not Currently     Partners: Male     Birth control/protection: Coitus interruptus   Other Topics Concern   • None   Social History Narrative   • None     Social Determinants of Health     Financial Resource Strain: Not on file   Food Insecurity: Not on file   Transportation Needs: Not on file   Physical Activity: Not on file   Stress: Not on file   Social Connections: Not on file   Intimate Partner Violence: Not on file   Housing Stability: Not on file      Family History:     Family History   Problem Relation Age of Onset   • Hypertension Mother    • Uterine cancer Mother 50   • Hypothyroidism Mother    • Ankylosing spondylitis Mother    • Asthma Mother    • Cancer Mother         Uterine   • Thyroid disease Mother    • Cancer Father         Prostate   • Prostate cancer Father 58   • No Known Problems Sister    • No Known Problems Sister    • No Known Problems Brother    • No Known  "Problems Daughter    • No Known Problems Daughter    • No Known Problems Daughter    • No Known Problems Daughter    • Diabetes Maternal Grandmother    • Stroke Maternal Grandmother    • Cancer Maternal Grandfather         lung   • Lung cancer Maternal Grandfather    • Diabetes Maternal Grandfather    • Heart disease Paternal Grandfather    • Breast cancer Neg Hx    • Colon cancer Neg Hx    • Ovarian cancer Neg Hx    • Cervical cancer Neg Hx       Current Medications:     Current Outpatient Medications   Medication Sig Dispense Refill   • albuterol (PROVENTIL HFA,VENTOLIN HFA) 90 mcg/act inhaler Inhale 2 puffs every 6 (six) hours as needed for wheezing 8 g 2   • Prenatal Vit-Fe Fumarate-FA (PRENATAL VITAMINS PO) Take by mouth       No current facility-administered medications for this visit.      Allergies:     No Known Allergies   Physical Exam:     /66 (BP Location: Left arm, Patient Position: Sitting, Cuff Size: Standard)   Pulse 73   Ht 5' 5\" (1.651 m)   Wt 65.4 kg (144 lb 3.2 oz)   SpO2 99%   BMI 24.00 kg/m²     Physical Exam  Vitals and nursing note reviewed.   Constitutional:       General: She is not in acute distress.     Appearance: Normal appearance. She is well-developed. She is not ill-appearing, toxic-appearing or diaphoretic.   HENT:      Head: Normocephalic and atraumatic.      Right Ear: External ear normal.      Left Ear: External ear normal.      Nose: Nose normal.   Eyes:      Pupils: Pupils are equal, round, and reactive to light.   Cardiovascular:      Rate and Rhythm: Normal rate and regular rhythm.      Heart sounds: Normal heart sounds. No murmur heard.  Pulmonary:      Effort: Pulmonary effort is normal.      Breath sounds: Normal breath sounds.   Abdominal:      General: There is no distension.      Palpations: Abdomen is soft.      Tenderness: There is no abdominal tenderness. There is no guarding.   Neurological:      Mental Status: She is alert.          Danyel Phillip "    [facility]

## 2024-04-21 NOTE — ASSESSMENT & PLAN NOTE
Assessment and plan 1.  Health maintenance annual wellness examination overall the patient is clinically stable and doing well, we encouraged the patient to follow a healthy and balanced diet.  We recommend that the patient exercise routinely approximately 30 minutes 5 times per week .  We have reviewed the patient's vaccines and have made recommendations for updates if necessary    annual flu shot.  We will be ordering screening laboratories which are age appropriate.  Return to the office in 1 year    call if any problems.

## 2024-05-28 ENCOUNTER — APPOINTMENT (OUTPATIENT)
Dept: LAB | Facility: CLINIC | Age: 34
End: 2024-05-28
Payer: COMMERCIAL

## 2024-05-28 DIAGNOSIS — Z13.0 SCREENING, ANEMIA, DEFICIENCY, IRON: ICD-10-CM

## 2024-05-28 DIAGNOSIS — Z13.1 SCREENING FOR DIABETES MELLITUS: ICD-10-CM

## 2024-05-28 DIAGNOSIS — Z00.8 HEALTH EXAMINATION IN POPULATION SURVEY: ICD-10-CM

## 2024-05-28 DIAGNOSIS — E55.9 VITAMIN D DEFICIENCY: ICD-10-CM

## 2024-05-28 DIAGNOSIS — Z13.6 SCREENING FOR CARDIOVASCULAR CONDITION: ICD-10-CM

## 2024-05-28 DIAGNOSIS — Z13.29 SCREENING FOR THYROID DISORDER: ICD-10-CM

## 2024-05-28 LAB
25(OH)D3 SERPL-MCNC: 27.9 NG/ML (ref 30–100)
ALBUMIN SERPL BCP-MCNC: 4.6 G/DL (ref 3.5–5)
ALP SERPL-CCNC: 60 U/L (ref 34–104)
ALT SERPL W P-5'-P-CCNC: 9 U/L (ref 7–52)
ANION GAP SERPL CALCULATED.3IONS-SCNC: 8 MMOL/L (ref 4–13)
AST SERPL W P-5'-P-CCNC: 13 U/L (ref 13–39)
BASOPHILS # BLD AUTO: 0.04 THOUSANDS/ÂΜL (ref 0–0.1)
BASOPHILS NFR BLD AUTO: 1 % (ref 0–1)
BILIRUB SERPL-MCNC: 0.51 MG/DL (ref 0.2–1)
BUN SERPL-MCNC: 15 MG/DL (ref 5–25)
CALCIUM SERPL-MCNC: 9.5 MG/DL (ref 8.4–10.2)
CHLORIDE SERPL-SCNC: 103 MMOL/L (ref 96–108)
CHOLEST SERPL-MCNC: 221 MG/DL
CO2 SERPL-SCNC: 25 MMOL/L (ref 21–32)
CREAT SERPL-MCNC: 0.61 MG/DL (ref 0.6–1.3)
EOSINOPHIL # BLD AUTO: 0.27 THOUSAND/ÂΜL (ref 0–0.61)
EOSINOPHIL NFR BLD AUTO: 5 % (ref 0–6)
ERYTHROCYTE [DISTWIDTH] IN BLOOD BY AUTOMATED COUNT: 12.4 % (ref 11.6–15.1)
EST. AVERAGE GLUCOSE BLD GHB EST-MCNC: 105 MG/DL
FERRITIN SERPL-MCNC: 97 NG/ML (ref 11–307)
GFR SERPL CREATININE-BSD FRML MDRD: 117 ML/MIN/1.73SQ M
GLUCOSE P FAST SERPL-MCNC: 96 MG/DL (ref 65–99)
HBA1C MFR BLD: 5.3 %
HCT VFR BLD AUTO: 42.3 % (ref 34.8–46.1)
HDLC SERPL-MCNC: 54 MG/DL
HGB BLD-MCNC: 14 G/DL (ref 11.5–15.4)
IMM GRANULOCYTES # BLD AUTO: 0.01 THOUSAND/UL (ref 0–0.2)
IMM GRANULOCYTES NFR BLD AUTO: 0 % (ref 0–2)
IRON SATN MFR SERPL: 16 % (ref 15–50)
IRON SERPL-MCNC: 52 UG/DL (ref 50–212)
LDLC SERPL CALC-MCNC: 152 MG/DL (ref 0–100)
LYMPHOCYTES # BLD AUTO: 2.39 THOUSANDS/ÂΜL (ref 0.6–4.47)
LYMPHOCYTES NFR BLD AUTO: 41 % (ref 14–44)
MCH RBC QN AUTO: 29.7 PG (ref 26.8–34.3)
MCHC RBC AUTO-ENTMCNC: 33.1 G/DL (ref 31.4–37.4)
MCV RBC AUTO: 90 FL (ref 82–98)
MONOCYTES # BLD AUTO: 0.41 THOUSAND/ÂΜL (ref 0.17–1.22)
MONOCYTES NFR BLD AUTO: 7 % (ref 4–12)
NEUTROPHILS # BLD AUTO: 2.71 THOUSANDS/ÂΜL (ref 1.85–7.62)
NEUTS SEG NFR BLD AUTO: 46 % (ref 43–75)
NRBC BLD AUTO-RTO: 0 /100 WBCS
PLATELET # BLD AUTO: 258 THOUSANDS/UL (ref 149–390)
PMV BLD AUTO: 9.8 FL (ref 8.9–12.7)
POTASSIUM SERPL-SCNC: 4 MMOL/L (ref 3.5–5.3)
PROT SERPL-MCNC: 7.4 G/DL (ref 6.4–8.4)
RBC # BLD AUTO: 4.72 MILLION/UL (ref 3.81–5.12)
SODIUM SERPL-SCNC: 136 MMOL/L (ref 135–147)
TIBC SERPL-MCNC: 324 UG/DL (ref 250–450)
TRIGL SERPL-MCNC: 74 MG/DL
TSH SERPL DL<=0.05 MIU/L-ACNC: 3.83 UIU/ML (ref 0.45–4.5)
UIBC SERPL-MCNC: 272 UG/DL (ref 155–355)
WBC # BLD AUTO: 5.83 THOUSAND/UL (ref 4.31–10.16)

## 2024-05-28 PROCEDURE — 83036 HEMOGLOBIN GLYCOSYLATED A1C: CPT

## 2024-05-28 PROCEDURE — 83550 IRON BINDING TEST: CPT

## 2024-05-28 PROCEDURE — 82306 VITAMIN D 25 HYDROXY: CPT

## 2024-05-28 PROCEDURE — 80061 LIPID PANEL: CPT

## 2024-05-28 PROCEDURE — 83540 ASSAY OF IRON: CPT

## 2024-05-28 PROCEDURE — 85025 COMPLETE CBC W/AUTO DIFF WBC: CPT

## 2024-05-28 PROCEDURE — 80053 COMPREHEN METABOLIC PANEL: CPT

## 2024-05-28 PROCEDURE — 84443 ASSAY THYROID STIM HORMONE: CPT

## 2024-05-28 PROCEDURE — 36415 COLL VENOUS BLD VENIPUNCTURE: CPT

## 2024-05-28 PROCEDURE — 82728 ASSAY OF FERRITIN: CPT

## 2024-06-07 ENCOUNTER — NURSE TRIAGE (OUTPATIENT)
Age: 34
End: 2024-06-07

## 2024-06-07 NOTE — TELEPHONE ENCOUNTER
"Returned patient's call. Patient is breastfeeding her daughter who will be turning [age] next month and she plans to wean her at that time. She is newly pregnant and inquiring about safety of breastfeeding in pregnancy. Reviewed she can continue to breastfeed. Reviewed potential for supply to drop r/t hormones, and need for adequate nutrition as both pregnancy and lactation are demanding on her body. Patient verbalizes understanding.    Answer Assessment - Initial Assessment Questions  1. REASON FOR CALL or QUESTION: \"What is your reason for calling today?\" or \"How can I best help you?\" or \"What question do you have that I can help answer?\"      Patient is still breastfeeding her daughter who will be one in 1 month and she plans to wean at that time. Wondering if its safe to continue breastfeeding in pregnancy.    Protocols used: Information Only Call - No Triage-ADULT-[place]    "

## 2024-06-07 NOTE — TELEPHONE ENCOUNTER
Regarding: breastfeeding while pregnant  ----- Message from Lilly PALMER sent at 6/7/2024 12:31 PM EDT -----  Patient had positive pregnancy test and we made an appointment. But she is still breastfeeding is that ok.

## 2024-06-18 ENCOUNTER — TELEPHONE (OUTPATIENT)
Dept: OBGYN CLINIC | Facility: CLINIC | Age: 34
End: 2024-06-18

## 2024-06-18 NOTE — TELEPHONE ENCOUNTER
Left patient a vm to offer her a sooner date for her D&V ... Asked if she wanted to come in on 6/27 with Dr. Bambi Foster for 11:30.      Please warm transfer to me or send Teams message to me directly

## 2024-07-01 ENCOUNTER — ULTRASOUND (OUTPATIENT)
Dept: OBGYN CLINIC | Facility: CLINIC | Age: 34
End: 2024-07-01
Payer: COMMERCIAL

## 2024-07-01 VITALS — DIASTOLIC BLOOD PRESSURE: 72 MMHG | SYSTOLIC BLOOD PRESSURE: 102 MMHG

## 2024-07-01 DIAGNOSIS — N91.2 AMENORRHEA: Primary | ICD-10-CM

## 2024-07-01 DIAGNOSIS — Z36.89 ENCOUNTER TO ESTABLISH GESTATIONAL AGE USING ULTRASOUND: ICD-10-CM

## 2024-07-01 DIAGNOSIS — Z3A.08 8 WEEKS GESTATION OF PREGNANCY: ICD-10-CM

## 2024-07-01 PROCEDURE — 99213 OFFICE O/P EST LOW 20 MIN: CPT | Performed by: NURSE PRACTITIONER

## 2024-07-01 PROCEDURE — 76817 TRANSVAGINAL US OBSTETRIC: CPT | Performed by: NURSE PRACTITIONER

## 2024-07-01 NOTE — PATIENT INSTRUCTIONS
"Again, congratulations on your pregnancy!  NEXT STEPS    Return to our office in 1-2 weeks for an OB intake and initial prenatal Exam(or sooner if any problems/concerns arise- see packet for things to report)  Check out [employer] website to read the \"Pregnancy Essentials Guide\" -this has lots of great early pregnancy information     It can be found by going to [employer]-->select services-->select women's health-->select Obstetrics  [email or web address]     Below is a variety of information that is useful in early pregnancy   Genetic screening can be very confusing for most people   We do recommend genetic screening universally for all pregnant women. Our goal is to have the most healthy uncomplicated pregnancy possible and this is one way to identify possible complications early.  Common disorders we can screen for include: Down Syndrome, Neural tube defects ( like spina bifida or gastroschisis) and trisomy 13, even possibly more  There are several options we will talk more about. Below is some information to get you started thinking about this.  We will discuss this more at the next appt.      GUIDE TO GENETIC TESTING     Aneuploidy Testing  Trisomy 21 (Down Syndrome), Trisomy 18, and Open Neural Tube Defects (Spina Bifida).  You may choose one of the following options: See below for CPT/Diagnostic codes  NIPT (Non-Invasive Prenatal Testing or Cell Free- Fetal DNA): This simple and accurate non-invasive prenatal screening blood test offers results for early risk assessment of Down syndrome (Trisomy 21), or Trisomy 18, trisomy 13 and other aneuploidy conditions.  The test also offers an optional analysis for fetal sex.  The test analyzes the relative amount of 21, 18, 13; X and Y chromosome material in circulating cell-free DNA from a maternal blood sample.  This test can be performed at any time after 10 weeks gestation.  If you elect this test, you will also have an AFP " (alpha-fetoprotein) blood test to test for open neural tube defects.  Recommended follow up to a positive result is genetic counseling and prenatal diagnosis.     Sequential Screening with Nuchal Translucency: This is a two-step test to detect whether a fetus is at increased risk for trisomy 21, trisomy 18, trisomy 13 and open neural tube defects.  The test has a narrow window for testing (the first step must be performed between 10 and 13 weeks gestation).  It includes two blood draws and an ultrasound.  The ultrasound measures the amount of fluid behind the baby’s neck called the nuchal translucency (NT).  The blood tests measures three different maternal hormone levels, -- pregnancy associated plasma protein (ANSON-A), human chorionic gonadotrophin (hCG), and dimeric inhibin A (ELIANE).  These measurements in combination with some maternal information such as height and weight are used to calculate whether the baby is at increased risk for Down Syndrome or Trisomy 18.  An alpha-fetoprotein test (AFP) is also included to test for open neural tube defects.  Recommended follow up to a positive result is additional testing that is more definitive, and referral for genetic counseling and prenatal diagnosis.    Quad Screen: This test is also known as the quadruple marker test.  It is a test that measures levels of four substances in a pregnant woman’s blood--Alpha-fetoprotein (AFP), a protein made by the developing baby; Human chorionic gonadotropin (hCG), a hormone made by the placenta; Estriol, a hormone made by the placenta and the baby’s liver; and Inhibin A, another hormone made by the placenta.  Typically, the quad screen is done between weeks 15 and 20 of pregnancy--the second trimester and the results indicate whether the baby is at higher risk for Down Syndrome (Trisomy 21), Trisomy 18, and open neural tube defects.  This is a screening test.  Recommended follow up to a positive result is additional testing that  is more definitive, as well as referral for genetic counseling and prenatal diagnosis.         Trisomy 21 Trisomy 18 Trisomy 13   NIPT  (FPR 0.1%) <99% <98% 99%   Sequential Screening (FPR 3.5%) 92% 90% N/A   Quad Screen   (FPR 5%) 83% 80% N/A   (FPR is False Positive Rate)        Additional Screening Tests Offered  Cystic Fibrosis: Cystic Fibrosis is the most common inherited disease of children and young adults.  The carrier frequency is 1 in 24, to 1 in 97.  Both parents need to be carriers for a child to be affected (25% chance).  One in 3500, children born are affected.  Cystic fibrosis is a disorder of mucus production and produces abnormally thick mucus leading to life threatening lung infections, digestion problems, poor growth, infertility, and more.  Symptoms range from mild to severe, but individuals with severe disease may die in childhood.  With treatments today, people with Cystic Fibrosis can live into their 30’s.  CF does not affect intelligence.  Recommended follow up to a positive result is testing of the baby’s father.  Spinal Muscular Atrophy (SMA): SMA is the most common inherited cause of early childhood death.  The carrier frequency is 1 in 47 to 1 in 72 in the [place] and both parents need to be carriers for a child to be affected (25% chance).  1 in 11,000 children are affected.  SMA is a progressive degeneration of lower motor neurons.  Muscle weakness is the most common type with respiratory failure by the age of 2 years old.  Muscles responsible for crawling, walking, swallowing, and head and neck control are most severely affected.  Recommended follow up to a positive result is testing of the baby’s father.      Fragile X Syndrome (the most common inherited cause of developmental delays): Fragile X syndrome is an “X-linked” genetic disease, which means it is only carried by the mom.  Unfortunately, 1 in 250 females are carriers and a child has a 50% chance of being affected if this is the  case.  1 in 4000 boys is affected with Fragile X and 1 in 8000 girls is affected.  Approximately 1/3 of all children born with Fragile X also have autism and hyperactivity.  Recommended follow up to a positive result is genetic counseling and prenatal diagnosis.       Guide To Insurance Coverage For Genetic Screening  Due to the complexity of coverage guidelines, we are unable to quote benefits or guarantee insurance coverage for any of these tests.  Insurance benefits are plan-specific and offer vastly different coverage based on your policy.  The handout attached explains the benefits to each test, and also provides the billing (CPT) codes for each test.  Even if the testing is covered, it could be applied to any unmet deductibles, and copays may apply, resulting in a bill.  You are encouraged to contact your insurance company to obtain your benefits based on your age and risk factors, so that there are no surprises.       Test Insurance Procedure (CPT) code   NIPT-cell free fetal DNA Most accurate non-invasive test- not always covered w/o risk factors 30912   Sequential Screen w/ NT US Current standard test-should be covered Part 1: (if lab is [facility]) 29855,09740   (If lab is [facility]) 83239  Part 2: (if lab is [facility])91188,51570,75292, 53705  (If lab is [facility]) 67673   Quad screen Old standard-use if past 1st trimester 40628, 68596, 43261, 99795   CF- Cystic Fibrosis   45136   SMA- Spinal Musc. Atrophy   88083   Fragile X   46930         Diagnosis code used Varies based on history- But will be one of these:  Encounter for  screening for other genetic defect Z36.8  Advanced Maternal Age (over 35) O09.529  Family History of Genetic Disease Carrier Z84.81     Please contact your insurance company with the appropriate CPT code from the attached list, and diagnosis code applicable to your situation.     We ask that you review this information and decide what testing you would like to have performed.  Please  note that the Sequential Screening with Nuchal Translucency has a smaller window of time to be performed during pregnancy (Prior to 14 wks).           Warning Signs During Pregnancy  The list below includes warning signs your providers would like you to be aware of.  If you experience any of these at any time during your pregnancy, please call us as soon as possible.      Vaginal bleeding   Sharp abdominal pain that does not go away   Fever (more than 100.4?F and is not relieved with Tylenol)   Persistent vomiting lasting greater than 24 hours   Chest pain   Pain or burning when you urinate   Severe headache that doesn’t resolve with Tylenol   Blurred vision or seeing spots in your vision   Sudden swelling of your face or hands   Redness, swelling or pain in a leg   A sudden weight gain in just a few days   Decrease in your baby’s movements (after 28 weeks or the 6th month of pregnancy)   A loss of watery fluid from your vagina - can be a gush, a trickle or  continuous wetness   After 20 weeks of pregnancy, rhythmic cramping (greater than 4 per hour)  or menstrual like low/pelvic pain     Call the OFFICE [phone] for any questions/emergencies.  At night or on the weekend, please indicate it is an emergency and the DOCTOR on call will be paged.     Discomforts of Early Pregnancy     Tips for coping with nausea and vomiting during pregnancy   Eat meals and snacks slowly   Eat every 1-2 hours to avoid a full stomach   Don’t skip meals, avoid empty stomach   Eat a snack prior to getting out of bed   Avoid food and beverages with a strong aroma   Avoid dehydration - drink enough fluid to keep the urine pale yellow   Drink fluids before a meal to minimize the effect of a full stomach   Limit the amount of coffee and beverages that contain caffeine   Eliminate spicy, odorous, high fat (fried foods), acidic (tomato products) and sweet foods   Fluids that contain lemon (lemonade), mint (tea) or orange can usually be well  tolerated   Snacks and meals that contain low-fat protein (lean meats, fish, poultry and eggs) along with eating easily digestible carbs (fruit, rice, toast, crackers and dry cereal) may be tolerated better   Foods with ginger may be well tolerated. May use ginger root powder, capsules or extract (up to 1000 mg per day)   Drink liquids in small amounts     If symptoms persist, please contact your provider.        Tips for coping with constipation during pregnancy   Increase fiber and fluids.  - Drink 8-10 cups of liquid, like water or low-sugar juice daily  - Keep urine pale with fluids (water, milk), fruit and vegetables   Eat a well-balanced diet that contains high fiber food (fruits, vegetables, whole grain breads and cereals, bran and dried beans)   Take a 30-minute walk daily   You may take a mild stool softener such as Colace®     If symptoms persist, please contact your provider.        For any emergencies, PLEASE CALL THE OFFICE at [phone]. If the office is closed, the doctor on call will be paged by the answering service.     Medications and Pregnancy- see Pregnancy Essentials guide online- page 9     Expected Weight Gain During Pregnancy  If you have a healthy BMI (18-25) prior to pregnancy:  The recommended weight gain is between 25-35 pounds. Approximate weight gain  in the first trimester is 1-4.5 pounds. An expected weight gain during the second and  third trimester is approximately one pound per week.  If you have a BMI of less than 18 prior to pregnancy,  you are considered underweight:  The recommended weight gain is between 28-40 pounds. Approximate weight gain  in the first trimester is 1-4.5 pounds. An expected weight gain during the second and  third trimester is just over one pound per week.  If your BMI is 25 to 29.9: you are considered overweight:  You should gain 1/2 to 2/3 pound during the second and third trimester, for a total  weight gain of 15 to 25 pounds.  If you have a BMI of  greater than 30 prior to pregnancy,  you are considered overweight:  The recommended weight gain is between 15-25 pounds. Approximate weight gain  in the first trimester is 1-4.5 pounds. An expected weight gain during the second  and third trimester is approximately 0.5 pound per week.     Foods to avoid during pregnancy:   Unpasteurized milk, juice and cheese  - Soft cheeses like feta or brie (if made with UNPASTEURIZED milk)   Unheated deli meats like lunchmeat and hotdogs   Undercooked poultry, beef, pork, seafood including raw sushi     What fish is safe to eat during pregnancy?  Eat 8 to 12 ounces of fish a week. Pick from this group frequently, especially if you follow  the American Heart Association’s recommendation to eat fish at least 2 times a week.     AVOID HIGH MERCURY FISH  A single meal of the following fish can put  you over the Environmental Protection  Agency’s safe limit for the month.  High mercury fish:  Shark  Swordfish  Hector Mackerel  Tile Fish     Caffeine and Pregnancy     The March  and American College of Obstetrics and Gynecologists (ACOG) urge pregnant  women to limit their caffeine consumption to no more than 200 milligrams (mg) per day. This is  comparable to having one 12-ounce cup of coffee a day. This level has been shown not to increase risk  of miscarriage, growth or  labor complications. Effects of higher levels are not known.     Exercise During Pregnancy  A daily exercise program that consists of 30 minutes a day is recommended.   Low impact exercises like walking and swimming are great exercises throughout  all of pregnancy   If you’re an avid strength  avoid lifting very heavy weights - nothing more  than 30 pounds     Drink plenty of fluids while exercising to stay hydrated.  Be careful to avoid overheating.     ACTIVITIES TO AVOID   Exercises that can make you lose your balance.   Activities that can put your baby at risk i.e. horseback riding, scuba  diving, skiing  or snowboarding. Any other sport that puts you at risk for getting hit in the  abdominal area.   Do not use saunas, steam rooms or hot tubs (that have a higher temperature  than 100F)   After the first trimester, avoid exercises that require you to lay flat on your back.   Avoid exceeding a heart rate greater than 140 beats per minute. As long as you are  able to hold a conversation while exercising your heart rate is likely acceptable

## 2024-07-01 NOTE — PROGRESS NOTES
Subjective  Patient ID: Zhanna Torres is a 33 y.o. female here for Pregnancy Ultrasound    (10, 9, 8, 5, 1 yo girl)   Accompanied by  Jean-Paul and daughter Kadeem.     LMP of 24 giving her an NIKOS of 25 and a gestational age of  8  weeks 6 days (based on LMP)    Menstrual cycle: 3 menses since delivery, cycle length: 31 days  Pregnancy was planned.   She has started taking a prenatal vitamin    She is C/O amenorrhea  Signs and symptoms of pregnancy:   Breast tenderness: no  Fatigue: yes  Cramping or Pelvic Pain: no  Spotting or Vaginal Bleeding: no  Nausea or vomiting: occ nausea without vomiting.     OB History    Para Term  AB Living   5 5 5 0 0 5   SAB IAB Ectopic Multiple Live Births   0 0 0 0 5      # Outcome Date GA Lbr Dawit/2nd Weight Sex Type Anes PTL Lv   5 Term 23 39w1d / 00:07 3790 g (8 lb 5.7 oz) F Vag-Spont None N FLORIAN   4 Term 18 40w2d / 00:05 3941 g (8 lb 11 oz) F Vag-Spont None N FLORIAN      Complications: Unstable lie of fetus   3 Term 16 40w6d / 00:14 3890 g (8 lb 9.2 oz) F Vag-Spont None N FLORIAN      Birth Comments: Healthy Baby girl   2 Term 14 38w5d  3600 g (7 lb 15 oz) F Vag-Spont None N FLORIAN      Birth Comments: Healthy Baby   1 Term 13 39w0d  3685 g (8 lb 2 oz) F Vag-Spont None N FLORIAN      Birth Comments: Healthy Baby        The following portions of the patient's history were reviewed and updated as appropriate: allergies, current medications, past family history, past medical history, past social history, past surgical history, and problem list.    Perinent hx that may affect pregnancy:  Anxiety  Asthma        Review of Systems    See HPI for pertinent positives.             /72 (BP Location: Right arm, Patient Position: Sitting, Cuff Size: Standard)   LMP 2024 (Exact Date)   OBGyn Exam  Results for orders placed or performed in visit on 24   Comprehensive metabolic panel   Result Value Ref Range    Sodium 136 135 -  147 mmol/L    Potassium 4.0 3.5 - 5.3 mmol/L    Chloride 103 96 - 108 mmol/L    CO2 25 21 - 32 mmol/L    ANION GAP 8 4 - 13 mmol/L    BUN 15 5 - 25 mg/dL    Creatinine 0.61 0.60 - 1.30 mg/dL    Glucose, Fasting 96 65 - 99 mg/dL    Calcium 9.5 8.4 - 10.2 mg/dL    AST 13 13 - 39 U/L    ALT 9 7 - 52 U/L    Alkaline Phosphatase 60 34 - 104 U/L    Total Protein 7.4 6.4 - 8.4 g/dL    Albumin 4.6 3.5 - 5.0 g/dL    Total Bilirubin 0.51 0.20 - 1.00 mg/dL    eGFR 117 ml/min/1.73sq m   Hemoglobin A1C   Result Value Ref Range    Hemoglobin A1C 5.3 Normal 4.0-5.6%; PreDiabetic 5.7-6.4%; Diabetic >=6.5%; Glycemic control for adults with diabetes <7.0% %     mg/dl   Lipid Panel with Direct LDL reflex   Result Value Ref Range    Cholesterol 221 (H) See Comment mg/dL    Triglycerides 74 See Comment mg/dL    HDL, Direct 54 >=50 mg/dL    LDL Calculated 152 (H) 0 - 100 mg/dL   TSH, 3rd generation   Result Value Ref Range    TSH 3RD GENERATON 3.833 0.450 - 4.500 uIU/mL   Vitamin D 25 hydroxy   Result Value Ref Range    Vit D, 25-Hydroxy 27.9 (L) 30.0 - 100.0 ng/mL   CBC and differential   Result Value Ref Range    WBC 5.83 4.31 - 10.16 Thousand/uL    RBC 4.72 3.81 - 5.12 Million/uL    Hemoglobin 14.0 11.5 - 15.4 g/dL    Hematocrit 42.3 34.8 - 46.1 %    MCV 90 82 - 98 fL    MCH 29.7 26.8 - 34.3 pg    MCHC 33.1 31.4 - 37.4 g/dL    RDW 12.4 11.6 - 15.1 %    MPV 9.8 8.9 - 12.7 fL    Platelets 258 149 - 390 Thousands/uL    nRBC 0 /100 WBCs    Segmented % 46 43 - 75 %    Immature Grans % 0 0 - 2 %    Lymphocytes % 41 14 - 44 %    Monocytes % 7 4 - 12 %    Eosinophils Relative 5 0 - 6 %    Basophils Relative 1 0 - 1 %    Absolute Neutrophils 2.71 1.85 - 7.62 Thousands/µL    Absolute Immature Grans 0.01 0.00 - 0.20 Thousand/uL    Absolute Lymphocytes 2.39 0.60 - 4.47 Thousands/µL    Absolute Monocytes 0.41 0.17 - 1.22 Thousand/µL    Eosinophils Absolute 0.27 0.00 - 0.61 Thousand/µL    Basophils Absolute 0.04 0.00 - 0.10 Thousands/µL    TIBC Panel (incl. Iron, TIBC, % Iron Saturation)   Result Value Ref Range    Iron Saturation 16 15 - 50 %    TIBC 324 250 - 450 ug/dL    Iron 52 50 - 212 ug/dL    UIBC 272 155 - 355 ug/dL   Ferritin   Result Value Ref Range    Ferritin 97 11 - 307 ng/mL       FIRST TRIMESTER OBSTETRIC ULTRASOUND     Patient's last menstrual period was 2024 (exact date).    INDICATION: Establish Gestational Age       FINDINGS:  See imaging report for details     Additional Findings: none     FHR: 163  IMPRESSION:    Single intrauterine pregnancy of 8 weeks 1 days gestational age  Fetal cardiac activity detected.  No adnexal masses seen.  EDC by LMP: 25  EDC by this Ultrasound: 25    Assigning a Final NIKOS  Please choose how you are assigning the NIKOS: The gestational age by LMP is </= 8w 6d and demonstrates 5 or fewer days difference from the gestational age by CRL, therefore the final NIKOS will be based on the LMP    Final NIKOS: 25 by LMP.    SNEHAL Tai  [address] [place] [address] [facility] [address] [address]  Dept: [phone]  Dept Fax: [phone]  Ultrasound Probe Disinfection    A transvaginal ultrasound was performed.   Prior to use, disinfection was performed with High Level Disinfection Process ([employer])  Probe serial number [id number]:  [id number] was used    SNEHAL Tai  24  2:34 PM           Assessment/Plan:         1. Amenorrhea  -     AMB US OB < 14 weeks single or first gestation level 1  2. Encounter to establish gestational age using ultrasound  -     AMB US OB < 14 weeks single or first gestation level 1  3. 8 weeks gestation of pregnancy  -     Ambulatory Referral to Maternal Fetal Medicine; Future; Expected date: 2024      Orders Placed This Encounter   Procedures    Ambulatory Referral to Maternal Fetal Medicine    AMB US OB < 14 weeks single or first gestation level 1

## 2024-07-01 NOTE — PROGRESS NOTES
Early ultrasound   LMP 2024  NIKOS: 2025  GA: 8w6d  Denies any bleeding, Leaking of fluid or cramping  Patient reports nausea.   Planned pregnancy  Regular menses     Patient was breastfeeding up until a week ago.   Ultrasound Probe Disinfection    A transvaginal ultrasound was performed.   Prior to use, disinfection was performed with High Level Disinfection Process ([employer]).  Probe serial number.Probe serial number: [id number]

## 2024-07-29 ENCOUNTER — ROUTINE PRENATAL (OUTPATIENT)
Facility: HOSPITAL | Age: 34
End: 2024-07-29
Payer: COMMERCIAL

## 2024-07-29 VITALS
WEIGHT: 139.77 LBS | HEIGHT: 65 IN | BODY MASS INDEX: 23.29 KG/M2 | HEART RATE: 70 BPM | SYSTOLIC BLOOD PRESSURE: 90 MMHG | DIASTOLIC BLOOD PRESSURE: 66 MMHG

## 2024-07-29 DIAGNOSIS — Z3A.08 8 WEEKS GESTATION OF PREGNANCY: ICD-10-CM

## 2024-07-29 DIAGNOSIS — O99.511 ASTHMA AFFECTING PREGNANCY IN FIRST TRIMESTER: ICD-10-CM

## 2024-07-29 DIAGNOSIS — Z36.0 ENCOUNTER FOR ANTENATAL SCREENING FOR CHROMOSOMAL ANOMALIES: ICD-10-CM

## 2024-07-29 DIAGNOSIS — Z3A.12 12 WEEKS GESTATION OF PREGNANCY: ICD-10-CM

## 2024-07-29 DIAGNOSIS — O09.891 SHORT INTERVAL BETWEEN PREGNANCIES COMPLICATING PREGNANCY, ANTEPARTUM, FIRST TRIMESTER: ICD-10-CM

## 2024-07-29 DIAGNOSIS — O09.521 ELDERLY MULTIGRAVIDA, FIRST TRIMESTER: Primary | ICD-10-CM

## 2024-07-29 DIAGNOSIS — Z13.79 ENCOUNTER FOR GENETIC SCREENING FOR DOWN SYNDROME: ICD-10-CM

## 2024-07-29 DIAGNOSIS — J45.909 ASTHMA AFFECTING PREGNANCY IN FIRST TRIMESTER: ICD-10-CM

## 2024-07-29 DIAGNOSIS — Z33.1 PREGNANT STATE, INCIDENTAL: ICD-10-CM

## 2024-07-29 PROCEDURE — 36415 COLL VENOUS BLD VENIPUNCTURE: CPT | Performed by: OBSTETRICS & GYNECOLOGY

## 2024-07-29 PROCEDURE — 76801 OB US < 14 WKS SINGLE FETUS: CPT | Performed by: OBSTETRICS & GYNECOLOGY

## 2024-07-29 PROCEDURE — 76813 OB US NUCHAL MEAS 1 GEST: CPT | Performed by: OBSTETRICS & GYNECOLOGY

## 2024-07-29 PROCEDURE — 99243 OFF/OP CNSLTJ NEW/EST LOW 30: CPT | Performed by: OBSTETRICS & GYNECOLOGY

## 2024-07-29 NOTE — LETTER
July 29, 2024     Bambi Foster DO  [address] [facility] [address]    Patient: Zhanna Torres   YOB: 1990   Date of Visit: 7/29/2024       Dear Dr. Foster:    Thank you for referring Zhanna Torres to me for evaluation. Below are my notes for this consultation.    If you have questions, please do not hesitate to call me. I look forward to following your patient along with you.         Sincerely,        Alex Hernandez MD        CC: No Recipients    Alex Hernandez MD  7/29/2024  8:16 AM  Sign when Signing Visit  Please refer to the [facility] ultrasound report in Ob Procedures for additional information regarding today's visit

## 2024-07-29 NOTE — PROGRESS NOTES
Patient chose to have [facility] [id number] Non-Invasive Prenatal Screen 535248 MT21 PLUS Core + SCA, NO fetal sex.  Patient choose to be billed through insurance.     Patient given brochure and is aware [facility] will contact patient's insurance and coordinate coverage.  Provided [facility] contact information. General inquiries [phone], Cost estimates [phone] and [facility] Billing [phone]. Website [email or web address].     Blood collection tubes labeled with patient identifiers (name, medical record number, and date of birth).     Filled out [facility] order form. Patient chose to have blood drawn in our office at time of visit. NIPS was drawn from left arm with a butterfly needle by Екатерина SILVA MA.    Maternal Fetal Medicine will have results in approximately 5-7 business days and will call patient or notify via [employer].  Patient aware viewing lab result online will reveal fetal sex if ordered.    Patient verbalized understanding of all instructions and no questions at this time.

## 2024-07-29 NOTE — PROGRESS NOTES
Please refer to the [facility] ultrasound report in Ob Procedures for additional information regarding today's visit

## 2024-08-02 LAB
CFDNA.FET/CFDNA.TOTAL SFR FETUS: NORMAL %
CITATION REF LAB TEST: NORMAL
FET 13+18+21+X+Y ANEUP PLAS.CFDNA: NEGATIVE
FET CHR 21 TS PLAS.CFDNA QL: NEGATIVE
FET CHR 21 TS PLAS.CFDNA QL: NEGATIVE
FET MS X RISK WBC.DNA+CFDNA QL: NOT DETECTED
FET SEX PLAS.CFDNA DOSAGE CFDNA: NORMAL
FET TS 13 RISK PLAS.CFDNA QL: NEGATIVE
FET X + Y ANEUP RISK PLAS.CFDNA SEQ-IMP: NOT DETECTED
GA EST FROM CONCEPTION DATE: NORMAL D
GESTATIONAL AGE > 9:: YES
LAB DIRECTOR NAME PROVIDER: NORMAL
LAB DIRECTOR NAME PROVIDER: NORMAL
LABORATORY COMMENT REPORT: NORMAL
LIMITATIONS OF THE TEST: NORMAL
NEGATIVE PREDICTIVE VALUE: NORMAL
PERFORMANCE CHARACTERISTICS: NORMAL
POSITIVE PREDICTIVE VALUE: NORMAL
REF LAB TEST METHOD: NORMAL
SL AMB NOTE:: NORMAL
TEST PERFORMANCE INFO SPEC: NORMAL

## 2024-08-13 PROBLEM — Z3A.15 15 WEEKS GESTATION OF PREGNANCY: Status: ACTIVE | Noted: 2024-08-13

## 2024-08-13 NOTE — PATIENT INSTRUCTIONS
"Valuable Online Resource:    [facility] pregnancy essential guide    [email or web address]      On the right side of the screen is a 50 page guide providing valuable information about your entire pregnancy.    On the left hand side of the site you will see several other links to great information and resources that [facility] offers     If you click on the tab that says \"Pregnancy and Birth Packet\" this opens another  guide to labor and delivery information as well as breast feeding information,  care, pediatricians, car seat safety and much more     The [facility] tab has a virtual tour of the new L&D unit, as well as valuable information about classes that are offered, breast feeding support, support groups and much more.     I highly recommend the virtual Breast Feeding class, very informational even if you have breast fed in the past. Check for available dates !    Click around and enjoy all we have to offer!    Please note that all information in regards to locations and visiting hours have not been updated due to COVID    Your delivery location is [facility] @ [address] [facility] [address]         Maternal Fetal Medicine     Nuchal Translucency ( NT) ultrasound is offered in the first trimester of pregnancy to assess your developing baby and look for any markers that may indicate a risk for certain chromosomal conditions such as Down's syndrome.  This ultrasound is offered to all pregnant women along with several options for blood screening.     During your ultrasound appointment the Perinatologist will discuss these options and together you can decide which screen is best for you.  We encourage you to view the following video prior to your appointment to learn more:  [email or web address]     Please check your individual insurance plan to determine if the screening is covered, requires prior " authorization or if you will incur any out of pocket cost.   It is also important to know if your insurance company has a preferred in network lab such as [facility] or [facility].     Below is list of CPT codes for blood screening options.   CPT codes are procedure codes that tell your insurance company what testing is being done.     In order for testing to be performed in a timely and efficient manner it is best if you have this information available before your first visit with our office.  Please note, [facility]'s genetic testing division is called [facility].     Sequential Screen - 2 part screen, CPT codes are dependent on the lab used:     [facility] lab    Part 1 code 30782,92072      Part 2 code 94877,42623,19974, 06727     [facility]            Part 1 code  67603                Part 2 code 60660        NIPT (cell free DNA testing)  CPT code 41812        NIPT testing through [facility]/ [facility] [facility] [employer] [facility] is called [id number].   Please use the  @ [employer]   > click estimate my cost>  pregnancy>   OuvfyefV27 plus  OR call # [phone] and speak to a .   Be sure to ask about the Every [employer]OM[employer] program for additional financial assistance.     NIPT testing through [facility] is called [facility].  Please use the  @ [employer].     If you have any questions please feel free to reach out to our office at [phone].  Genetic Counseling appointments are available for any patient but strongly encouraged for Moms 35 or older or patients with family history of genetic disorders. Patient Education     Pregnancy - The Fourth Month   About this topic   It is important for you to learn how to take care of yourself to help you have a healthy baby and safe delivery. It is good to have health care throughout your pregnancy.  The fourth month of your pregnancy starts around week 14 and lasts through week 18. By knowing how far  along you are, you can learn what is normal for this stage of your pregnancy and plan for what is next.  General   Growth and Development   During the fourth month of your pregnancy, here are some things you can expect.  You may:  Start to show that you are pregnant. It is normal to gain about 5 to 10 pounds (2.3 to 4.5 kg) total in your first 4 months.  Have heartburn  Feel like you have trouble paying attention to things  Have less nausea  Notice your breasts are growing and the veins are easier to see on them  Have swollen veins in your legs and feet, more nosebleeds, or bleeding when you brush your teeth. These are all because of the extra blood your body has while you are pregnant.  Notice more swelling in your hands and feet  Start to feel fluttering when you are lying or sitting quietly. This is your baby kicking.  Have pain in your sides with sudden movement. This is normal and happens because the ligaments in your belly are stretching.  Have a little more energy. Exercise is good for you, but check with your doctor before starting new exercises.  Most of the time it is safe for you to have sex while you are pregnant. It won’t hurt the baby.  Your baby’s:  Skin is very thin and you can easily see blood vessels through it. Your baby is covered with lots of fine hair to protect their skin.  Bones are starting to harden. Your baby is able to frown, smile, stretch, and move.  Practicing breathing movements while inside of your womb  About 6 inches (16 cm) long and weighs about 7 ounces (200 gm). Your baby is about the size of an orange.  Things to Think About   Avoid alcohol, drugs, tobacco products, and second hand smoke  Check with your doctor before taking any kind of drugs. Continue to take your vitamin with folic acid.  Avoid cleaning cat litter boxes. This can cause a disease that causes birth defects in your baby.  Amniocentesis and other prenatal screening tests may be done this month.  Try sleeping on  your side. Use a pillow between your legs. Avoid sleeping on your back. This will help with the blood flow to your baby.  Change positions and get up slowly. Your heart has to work hard to cope with all of the extra blood volume.  Where will you take your baby for care after they are born? This is a good time to find a doctor for your baby.  Eat fresh fruits and foods with a lot of fiber to help with hard stools.  Drink at least 6 to 8 glasses of water each day.  When do I need to call the doctor?   Vaginal bleeding  Leaking of fluid from your vagina  Problems with constipation  Belly pain  Any illness or infection  Severe headaches or headaches that won’t go away  Last Reviewed Date   2020-04-20  Consumer Information Use and Disclaimer   This generalized information is a limited summary of diagnosis, treatment, and/or medication information. It is not meant to be comprehensive and should be used as a tool to help the user understand and/or assess potential diagnostic and treatment options. It does NOT include all information about conditions, treatments, medications, side effects, or risks that may apply to a specific patient. It is not intended to be medical advice or a substitute for the medical advice, diagnosis, or treatment of a health care provider based on the health care provider's examination and assessment of a patient’s specific and unique circumstances. Patients must speak with a health care provider for complete information about their health, medical questions, and treatment options, including any risks or benefits regarding use of medications. This information does not endorse any treatments or medications as safe, effective, or approved for treating a specific patient. UpToDate, Inc. and its affiliates disclaim any warranty or liability relating to this information or the use thereof. The use of this information is governed by the Terms of Use, available at  https://www.woltersGOVECSuwer.com/en/know/clinical-effectiveness-terms   Copyright   Copyright © 2024 UpToDate, Inc. and its affiliates and/or licensors. All rights reserved.

## 2024-08-13 NOTE — ASSESSMENT & PLAN NOTE
PN1Kassy Chao is a 34 y.o. year old  at 15w2d who presents for initial prenatal visit. Planned pregnancy    Denies complaints. Denies pelvic pain, bleeding, LOF.    Works for [employer] overnight   Will not be starting ASA, she only has 1 risk factor, advanced maternal age, she originally thought her sister had pre eclampsia, she recently found out that her sister did not have pre eclampsia.  She will double check with MFM as to their recommendations     Prenatal labs WNL. Did not complete the early 1 hr,   Rh pos.  GC/CT sent today.  AFP ordered     Patient Education: Patient was counseled regarding diet, exercise, weight gain, foods to avoid, vaccines in pregnancy, aneuploidy screening, travel precautions to include seat belt use and VTE risk reduction.  She has been provided our pregnancy packet which includes pregnancy warning signs,how and when to contact providers, visit intervals, Maternal fetal medicine information, medication recommendations that are safe during pregnancy, dietary suggestions, activity recommendations, breastfeeding information as well as websites for additional information, and delivery location.    Past Medical History:   Diagnosis Date    Anemia     , and  (needed iron infusions)    Anorexia     resolved    Anxiety     pregnancy related    Asthma     Headache(784.0)     Migraine     As Child/Resolved    Normal delivery     2013 daughter, 2014 daughter, 2016 daughter    PONV (postoperative nausea and vomiting)     with wisdom teeth    Varicella     As child     Past Surgical History:   Procedure Laterality Date    ARM SKIN LESION BIOPSY / EXCISION Right     MYRINGOTOMY W/ TUBES      WISDOM TOOTH EXTRACTION       Immunization History   Administered Date(s) Administered    COVID-19 PFIZER VACCINE 0.3 ML IM 2020, 2021, 2021    H1N1, All Formulations 10/25/2009    Hep B, Adolescent or Pediatric 1993, 10/01/1993, 1994    INFLUENZA 2011,  09/18/2012, 01/22/2015    Influenza, injectable, quadrivalent, preservative free 0.5 mL 10/12/2018    MMR 07/01/2009    Td (adult), Unspecified 07/09/2009    Tdap 06/09/2014, 06/26/2014, 08/31/2018, 05/26/2023         Family History   Problem Relation Age of Onset    Diabetes Mother     Hypertension Mother     Uterine cancer Mother 50    Hypothyroidism Mother     Ankylosing spondylitis Mother     Asthma Mother     Cancer Mother         Uterine    Thyroid disease Mother     Cancer Father         Prostate    Prostate cancer Father 58    No Known Problems Sister     No Known Problems Sister     No Known Problems Daughter     No Known Problems Daughter     No Known Problems Daughter     No Known Problems Daughter     No Known Problems Daughter     Diabetes Maternal Grandmother     Stroke Maternal Grandmother     Cancer Maternal Grandfather         lung    Lung cancer Maternal Grandfather     Diabetes Maternal Grandfather     Heart disease Paternal Grandfather     Kidney failure Paternal Aunt     No Known Problems Half-Brother     Breast cancer Neg Hx     Colon cancer Neg Hx     Ovarian cancer Neg Hx     Cervical cancer Neg Hx      Social History     Tobacco Use    Smoking status: Never    Smokeless tobacco: Never   Vaping Use    Vaping status: Never Used   Substance Use Topics    Alcohol use: Not Currently     Comment: rare- BF    Drug use: No     Comment: denies self or   denies family use       Current Outpatient Medications:     Nutritional Supplements (VITAMIN D MAINTENANCE PO), Take 1,000 Units by mouth in the morning, Disp: , Rfl:     Prenatal Vit-Fe Fumarate-FA (PRENATAL VITAMINS PO), Take by mouth, Disp: , Rfl:   Patient Active Problem List    Diagnosis Date Noted    15 weeks gestation of pregnancy 08/13/2024    Neck mass 10/03/2022    Mass of right axilla 08/13/2019    Adenitis 08/13/2019    Vitamin D deficiency 05/20/2019    Abdominal bruit 05/20/2019    Hyperlipidemia 05/20/2019    Asthma 09/18/2012  "      No Known Allergies    OB History    Para Term  AB Living   6 5 5 0 0 5   SAB IAB Ectopic Multiple Live Births   0 0 0 0 5      # Outcome Date GA Lbr Dawit/2nd Weight Sex Type Anes PTL Lv   6 Current            5 Term 23 39w1d / 00:07 3790 g (8 lb 5.7 oz) F Vag-Spont None N FLORIAN   4 Term 18 40w2d / 00:05 3941 g (8 lb 11 oz) F Vag-Spont None N FLORIAN      Birth Comments: ECV mid labor-      Complications: Unstable lie of fetus   3 Term 16 40w6d / 00:14 3890 g (8 lb 9.2 oz) F Vag-Spont None N FLORIAN   2 Term 14 38w5d  3600 g (7 lb 15 oz) F Vag-Spont None N FLORIAN   1 Term 13 39w0d  3685 g (8 lb 2 oz) F Vag-Spont None N FLORIAN       Vitals:    08/15/24 0839   BP: 110/68   BP Location: Left arm   Patient Position: Sitting   Cuff Size: Large   Weight: 63.5 kg (140 lb)   Height: 5' 5\" (1.651 m)     Body mass index is 23.3 kg/m².      "

## 2024-08-13 NOTE — PROGRESS NOTES
15w0d  Pap 10/26/2023. Negative HPV Negative  GC/CT:  PN1 Labs: 2024  Blood Type: A  Positive   MFM Level 1:2024  MFM Level 2:  Schedule for 2024  AFP:   28 Week Labs:  TDap:  Flu:  GBS:   Blue Folder: given   Yellow Folder:  Ped Referral :  Breast pump:  L&D forms:  Delivery consent:     Patient reports that she is doing well in the the pregnancy .

## 2024-08-14 ENCOUNTER — APPOINTMENT (OUTPATIENT)
Dept: LAB | Facility: CLINIC | Age: 34
End: 2024-08-14
Payer: COMMERCIAL

## 2024-08-14 DIAGNOSIS — Z34.81 PRENATAL CARE, SUBSEQUENT PREGNANCY, FIRST TRIMESTER: ICD-10-CM

## 2024-08-14 LAB
ABO GROUP BLD: NORMAL
BACTERIA UR QL AUTO: ABNORMAL /HPF
BASOPHILS # BLD AUTO: 0.04 THOUSANDS/ÂΜL (ref 0–0.1)
BASOPHILS NFR BLD AUTO: 1 % (ref 0–1)
BILIRUB UR QL STRIP: NEGATIVE
BLD GP AB SCN SERPL QL: NEGATIVE
CLARITY UR: CLEAR
COLOR UR: YELLOW
EOSINOPHIL # BLD AUTO: 0.18 THOUSAND/ÂΜL (ref 0–0.61)
EOSINOPHIL NFR BLD AUTO: 3 % (ref 0–6)
ERYTHROCYTE [DISTWIDTH] IN BLOOD BY AUTOMATED COUNT: 14.4 % (ref 11.6–15.1)
GLUCOSE UR STRIP-MCNC: NEGATIVE MG/DL
HBV SURFACE AG SER QL: NORMAL
HCT VFR BLD AUTO: 35.4 % (ref 34.8–46.1)
HCV AB SER QL: NORMAL
HGB BLD-MCNC: 11.7 G/DL (ref 11.5–15.4)
HGB UR QL STRIP.AUTO: NEGATIVE
HIV 1+2 AB+HIV1 P24 AG SERPL QL IA: NORMAL
HIV 2 AB SERPL QL IA: NORMAL
HIV1 AB SERPL QL IA: NORMAL
HIV1 P24 AG SERPL QL IA: NORMAL
IMM GRANULOCYTES # BLD AUTO: 0.01 THOUSAND/UL (ref 0–0.2)
IMM GRANULOCYTES NFR BLD AUTO: 0 % (ref 0–2)
KETONES UR STRIP-MCNC: NEGATIVE MG/DL
LEUKOCYTE ESTERASE UR QL STRIP: ABNORMAL
LYMPHOCYTES # BLD AUTO: 2.09 THOUSANDS/ÂΜL (ref 0.6–4.47)
LYMPHOCYTES NFR BLD AUTO: 37 % (ref 14–44)
MCH RBC QN AUTO: 29.5 PG (ref 26.8–34.3)
MCHC RBC AUTO-ENTMCNC: 33.1 G/DL (ref 31.4–37.4)
MCV RBC AUTO: 89 FL (ref 82–98)
MONOCYTES # BLD AUTO: 0.3 THOUSAND/ÂΜL (ref 0.17–1.22)
MONOCYTES NFR BLD AUTO: 5 % (ref 4–12)
MUCOUS THREADS UR QL AUTO: ABNORMAL
NEUTROPHILS # BLD AUTO: 3.03 THOUSANDS/ÂΜL (ref 1.85–7.62)
NEUTS SEG NFR BLD AUTO: 54 % (ref 43–75)
NITRITE UR QL STRIP: NEGATIVE
NON-SQ EPI CELLS URNS QL MICRO: ABNORMAL /HPF
NRBC BLD AUTO-RTO: 0 /100 WBCS
PH UR STRIP.AUTO: 6 [PH]
PLATELET # BLD AUTO: 198 THOUSANDS/UL (ref 149–390)
PMV BLD AUTO: 10.2 FL (ref 8.9–12.7)
PROT UR STRIP-MCNC: NEGATIVE MG/DL
RBC # BLD AUTO: 3.96 MILLION/UL (ref 3.81–5.12)
RBC #/AREA URNS AUTO: ABNORMAL /HPF
RH BLD: POSITIVE
RUBV IGG SERPL IA-ACNC: 127.6 IU/ML
SP GR UR STRIP.AUTO: 1.02 (ref 1–1.03)
SPECIMEN EXPIRATION DATE: NORMAL
TREPONEMA PALLIDUM IGG+IGM AB [PRESENCE] IN SERUM OR PLASMA BY IMMUNOASSAY: NORMAL
UROBILINOGEN UR STRIP-ACNC: <2 MG/DL
WBC # BLD AUTO: 5.65 THOUSAND/UL (ref 4.31–10.16)
WBC #/AREA URNS AUTO: ABNORMAL /HPF

## 2024-08-14 PROCEDURE — 87340 HEPATITIS B SURFACE AG IA: CPT

## 2024-08-14 PROCEDURE — 36415 COLL VENOUS BLD VENIPUNCTURE: CPT

## 2024-08-14 PROCEDURE — 87389 HIV-1 AG W/HIV-1&-2 AB AG IA: CPT

## 2024-08-14 PROCEDURE — 86780 TREPONEMA PALLIDUM: CPT

## 2024-08-14 PROCEDURE — 87086 URINE CULTURE/COLONY COUNT: CPT

## 2024-08-14 PROCEDURE — 86900 BLOOD TYPING SEROLOGIC ABO: CPT

## 2024-08-14 PROCEDURE — 86901 BLOOD TYPING SEROLOGIC RH(D): CPT

## 2024-08-14 PROCEDURE — 86850 RBC ANTIBODY SCREEN: CPT

## 2024-08-14 PROCEDURE — 85025 COMPLETE CBC W/AUTO DIFF WBC: CPT

## 2024-08-14 PROCEDURE — 86803 HEPATITIS C AB TEST: CPT

## 2024-08-14 PROCEDURE — 86762 RUBELLA ANTIBODY: CPT

## 2024-08-14 PROCEDURE — 81001 URINALYSIS AUTO W/SCOPE: CPT

## 2024-08-15 ENCOUNTER — INITIAL PRENATAL (OUTPATIENT)
Dept: OBGYN CLINIC | Facility: CLINIC | Age: 34
End: 2024-08-15

## 2024-08-15 VITALS
BODY MASS INDEX: 23.32 KG/M2 | HEIGHT: 65 IN | SYSTOLIC BLOOD PRESSURE: 110 MMHG | DIASTOLIC BLOOD PRESSURE: 68 MMHG | WEIGHT: 140 LBS

## 2024-08-15 DIAGNOSIS — Z11.3 SCREENING FOR STDS (SEXUALLY TRANSMITTED DISEASES): ICD-10-CM

## 2024-08-15 DIAGNOSIS — Z3A.15 15 WEEKS GESTATION OF PREGNANCY: Primary | ICD-10-CM

## 2024-08-15 PROBLEM — J02.9 SORE THROAT: Status: RESOLVED | Noted: 2023-07-17 | Resolved: 2024-08-15

## 2024-08-15 PROBLEM — Z00.00 WELLNESS EXAMINATION: Status: RESOLVED | Noted: 2020-08-02 | Resolved: 2024-08-15

## 2024-08-15 PROBLEM — Z87.898 HISTORY OF BRADYCARDIA: Status: RESOLVED | Noted: 2023-03-23 | Resolved: 2024-08-15

## 2024-08-15 PROBLEM — R55 NEAR SYNCOPE: Status: RESOLVED | Noted: 2023-03-23 | Resolved: 2024-08-15

## 2024-08-15 PROCEDURE — 87491 CHLMYD TRACH DNA AMP PROBE: CPT | Performed by: OBSTETRICS & GYNECOLOGY

## 2024-08-15 PROCEDURE — PNV: Performed by: OBSTETRICS & GYNECOLOGY

## 2024-08-15 PROCEDURE — 87591 N.GONORRHOEAE DNA AMP PROB: CPT | Performed by: OBSTETRICS & GYNECOLOGY

## 2024-08-15 NOTE — PROGRESS NOTES
Problem   15 Weeks Gestation of Pregnancy    Blood Type: A.  +      Pap 10/26/2023. GC/CT   PN1 Labs: NML, not going to do early 1 hr   H&H: 11.7/35.4  28 Week Labs:  AFP:ordered   Level 1:  Level 2:  Tdap:  Flu:   GBS swab:     Blue folder:  Yellow folder:     Breast pump order:  L&D forms:    Delivery consent:   IOL:          Sore Throat (Resolved)   History of Bradycardia (Resolved)   Near Syncope (Resolved)   Wellness Examination (Resolved)     15 weeks gestation of pregnancy  PN1Kassy Chao is a 34 y.o. year old  at 15w2d who presents for initial prenatal visit. Planned pregnancy    Denies complaints. Denies pelvic pain, bleeding, LOF.    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  She will double check with MFM as to their recommendations     Prenatal labs WNL. Did not complete the early 1 hr,   Rh pos.  GC/CT sent today.  AFP ordered     Patient Education: Patient was counseled regarding diet, exercise, weight gain, foods to avoid, vaccines in pregnancy, aneuploidy screening, travel precautions to include seat belt use and VTE risk reduction.  She has been provided our pregnancy packet which includes pregnancy warning signs,how and when to contact providers, visit intervals, Maternal fetal medicine information, medication recommendations that are safe during pregnancy, dietary suggestions, activity recommendations, breastfeeding information as well as websites for additional information, and delivery location.    Past Medical History:   Diagnosis Date    Anemia     , and  (needed iron infusions)    Anorexia     resolved    Anxiety     pregnancy related    Asthma     Headache(784.0)     Migraine     As Child/Resolved    Normal delivery     2013 daughter, 2014 daughter, 2016 daughter    PONV (postoperative  nausea and vomiting)     with wisdom teeth    Varicella     As child     Past Surgical History:   Procedure Laterality Date    ARM SKIN LESION BIOPSY / EXCISION Right     MYRINGOTOMY W/ TUBES      WISDOM TOOTH EXTRACTION       Immunization History   Administered Date(s) Administered    COVID-19 PFIZER VACCINE 0.3 ML IM 12/21/2020, 01/11/2021, 09/25/2021    H1N1, All Formulations 10/25/2009    Hep B, Adolescent or Pediatric 08/01/1993, 10/01/1993, 06/01/1994    INFLUENZA 01/01/2011, 09/18/2012, 01/22/2015    Influenza, injectable, quadrivalent, preservative free 0.5 mL 10/12/2018    MMR 07/01/2009    Td (adult), Unspecified 07/09/2009    Tdap 06/09/2014, 06/26/2014, 08/31/2018, 05/26/2023         Family History   Problem Relation Age of Onset    Diabetes Mother     Hypertension Mother     Uterine cancer Mother 50    Hypothyroidism Mother     Ankylosing spondylitis Mother     Asthma Mother     Cancer Mother         Uterine    Thyroid disease Mother     Cancer Father         Prostate    Prostate cancer Father 58    No Known Problems Sister     No Known Problems Sister     No Known Problems Daughter     No Known Problems Daughter     No Known Problems Daughter     No Known Problems Daughter     No Known Problems Daughter     Diabetes Maternal Grandmother     Stroke Maternal Grandmother     Cancer Maternal Grandfather         lung    Lung cancer Maternal Grandfather     Diabetes Maternal Grandfather     Heart disease Paternal Grandfather     Kidney failure Paternal Aunt     No Known Problems Half-Brother     Breast cancer Neg Hx     Colon cancer Neg Hx     Ovarian cancer Neg Hx     Cervical cancer Neg Hx      Social History     Tobacco Use    Smoking status: Never    Smokeless tobacco: Never   Vaping Use    Vaping status: Never Used   Substance Use Topics    Alcohol use: Not Currently     Comment: rare- BF    Drug use: No     Comment: denies self or   denies family use       Current Outpatient Medications:      "Nutritional Supplements (VITAMIN D MAINTENANCE PO), Take 1,000 Units by mouth in the morning, Disp: , Rfl:     Prenatal Vit-Fe Fumarate-FA (PRENATAL VITAMINS PO), Take by mouth, Disp: , Rfl:   Patient Active Problem List    Diagnosis Date Noted    15 weeks gestation of pregnancy 2024    Neck mass 10/03/2022    Mass of right axilla 2019    Adenitis 2019    Vitamin D deficiency 2019    Abdominal bruit 2019    Hyperlipidemia 2019    Asthma 2012       No Known Allergies    OB History    Para Term  AB Living   6 5 5 0 0 5   SAB IAB Ectopic Multiple Live Births   0 0 0 0 5      # Outcome Date GA Lbr Dawit/2nd Weight Sex Type Anes PTL Lv   6 Current            5 Term 23 39w1d / 00:07 3790 g (8 lb 5.7 oz) F Vag-Spont None N FLORIAN   4 Term 18 40w2d / 00:05 3941 g (8 lb 11 oz) F Vag-Spont None N FLORIAN      Birth Comments: ECV mid labor-      Complications: Unstable lie of fetus   3 Term 16 40w6d / 00:14 3890 g (8 lb 9.2 oz) F Vag-Spont None N FLORIAN   2 Term 14 38w5d  3600 g (7 lb 15 oz) F Vag-Spont None N FLORIAN   1 Term 13 39w0d  3685 g (8 lb 2 oz) F Vag-Spont None N FLORIAN       Vitals:    08/15/24 0839   BP: 110/68   BP Location: Left arm   Patient Position: Sitting   Cuff Size: Large   Weight: 63.5 kg (140 lb)   Height: 5' 5\" (1.651 m)     Body mass index is 23.3 kg/m².        "

## 2024-08-16 LAB
BACTERIA UR CULT: NORMAL
C TRACH DNA SPEC QL NAA+PROBE: NEGATIVE
N GONORRHOEA DNA SPEC QL NAA+PROBE: NEGATIVE

## 2024-08-28 ENCOUNTER — TELEPHONE (OUTPATIENT)
Dept: OBGYN CLINIC | Facility: CLINIC | Age: 34
End: 2024-08-28

## 2024-08-28 NOTE — TELEPHONE ENCOUNTER
"PerLD CRNP, request, called pt to encourage having one hour glucose testing completed.  Per pt, \" I am not going to do it until the 28 wk labwk is ordered.\"  States she already had discussion with provider.  Routed to AM/  "

## 2024-08-28 NOTE — TELEPHONE ENCOUNTER
----- Message from SNEHAL Romero sent at 8/27/2024  1:37 PM EDT -----  Regarding: GCT  Please encourage her to complete her outstanding glucose test.

## 2024-08-29 ENCOUNTER — PATIENT MESSAGE (OUTPATIENT)
Dept: OBGYN CLINIC | Facility: CLINIC | Age: 34
End: 2024-08-29

## 2024-08-29 ENCOUNTER — TELEPHONE (OUTPATIENT)
Dept: OBGYN CLINIC | Facility: CLINIC | Age: 34
End: 2024-08-29

## 2024-08-29 NOTE — TELEPHONE ENCOUNTER
----- Message from Marj HILL sent at 7/15/2024  9:16 AM EDT -----  Regardinnd Trimester Call Due  -

## 2024-08-29 NOTE — TELEPHONE ENCOUNTER
Yes, thank you for responding and apologize for the miscommunication. I received a lab notification that it wasn't completed and just forwarded that message. We will wait until 28 weeks.

## 2024-09-10 ENCOUNTER — APPOINTMENT (OUTPATIENT)
Dept: LAB | Facility: CLINIC | Age: 34
End: 2024-09-10
Payer: COMMERCIAL

## 2024-09-10 DIAGNOSIS — Z3A.15 15 WEEKS GESTATION OF PREGNANCY: ICD-10-CM

## 2024-09-10 PROCEDURE — 36415 COLL VENOUS BLD VENIPUNCTURE: CPT

## 2024-09-10 PROCEDURE — 82105 ALPHA-FETOPROTEIN SERUM: CPT

## 2024-09-11 ENCOUNTER — ROUTINE PRENATAL (OUTPATIENT)
Dept: OBGYN CLINIC | Facility: CLINIC | Age: 34
End: 2024-09-11
Payer: COMMERCIAL

## 2024-09-11 VITALS — DIASTOLIC BLOOD PRESSURE: 68 MMHG | SYSTOLIC BLOOD PRESSURE: 104 MMHG | WEIGHT: 148 LBS | BODY MASS INDEX: 24.63 KG/M2

## 2024-09-11 DIAGNOSIS — Z3A.19 19 WEEKS GESTATION OF PREGNANCY: ICD-10-CM

## 2024-09-11 DIAGNOSIS — Z34.92 PRENATAL CARE IN SECOND TRIMESTER: Primary | ICD-10-CM

## 2024-09-11 LAB
SL AMB  POCT GLUCOSE, UA: NORMAL
SL AMB POCT URINE PROTEIN: NORMAL

## 2024-09-11 PROCEDURE — PNV: Performed by: STUDENT IN AN ORGANIZED HEALTH CARE EDUCATION/TRAINING PROGRAM

## 2024-09-11 PROCEDURE — 81002 URINALYSIS NONAUTO W/O SCOPE: CPT | Performed by: STUDENT IN AN ORGANIZED HEALTH CARE EDUCATION/TRAINING PROGRAM

## 2024-09-11 NOTE — PROGRESS NOTES
19 weeks gestation of pregnancy  35yo  at 19.1wks presents for routine prenatal visit     Pt denies contractions, vaginal bleeding, leakage of fluid. Endorses fetal movement.     -continue PNVs  -AFP result pending   -encouraged to complete 1hr GTT   - labor precautions provided  -return in 4 weeks

## 2024-09-11 NOTE — ASSESSMENT & PLAN NOTE
33yo  at 19.1wks presents for routine prenatal visit     Pt denies contractions, vaginal bleeding, leakage of fluid. Endorses fetal movement.     -continue PNVs  -AFP result pending   -encouraged to complete 1hr GTT   - labor precautions provided  -return in 4 weeks

## 2024-09-13 LAB
2ND TRIMESTER 4 SCREEN SERPL-IMP: NORMAL
AFP ADJ MOM SERPL: 1.34
AFP INTERP AMN-IMP: NORMAL
AFP INTERP SERPL-IMP: NORMAL
AFP INTERP SERPL-IMP: NORMAL
AFP SERPL-MCNC: 68.8 NG/ML
AGE AT DELIVERY: 36.5 YR
GA METHOD: NORMAL
GA: 19 WEEKS
IDDM PATIENT QL: NO
MULTIPLE PREGNANCY: NO
NEURAL TUBE DEFECT RISK FETUS: 4273 %

## 2024-09-19 ENCOUNTER — ROUTINE PRENATAL (OUTPATIENT)
Facility: HOSPITAL | Age: 34
End: 2024-09-19
Payer: COMMERCIAL

## 2024-09-19 VITALS
HEART RATE: 78 BPM | DIASTOLIC BLOOD PRESSURE: 60 MMHG | SYSTOLIC BLOOD PRESSURE: 106 MMHG | WEIGHT: 153.2 LBS | HEIGHT: 65 IN | BODY MASS INDEX: 25.52 KG/M2

## 2024-09-19 DIAGNOSIS — J45.909 ASTHMA AFFECTING PREGNANCY IN SECOND TRIMESTER: ICD-10-CM

## 2024-09-19 DIAGNOSIS — O09.522 ELDERLY MULTIGRAVIDA, SECOND TRIMESTER: Primary | ICD-10-CM

## 2024-09-19 DIAGNOSIS — Z3A.20 20 WEEKS GESTATION OF PREGNANCY: ICD-10-CM

## 2024-09-19 DIAGNOSIS — O99.512 ASTHMA AFFECTING PREGNANCY IN SECOND TRIMESTER: ICD-10-CM

## 2024-09-19 DIAGNOSIS — Z36.86 ENCOUNTER FOR ANTENATAL SCREENING FOR CERVICAL LENGTH: ICD-10-CM

## 2024-09-19 DIAGNOSIS — O09.892 SHORT INTERVAL BETWEEN PREGNANCIES COMPLICATING PREGNANCY, ANTEPARTUM, SECOND TRIMESTER: ICD-10-CM

## 2024-09-19 PROCEDURE — 76811 OB US DETAILED SNGL FETUS: CPT | Performed by: OBSTETRICS & GYNECOLOGY

## 2024-09-19 PROCEDURE — 76817 TRANSVAGINAL US OBSTETRIC: CPT | Performed by: OBSTETRICS & GYNECOLOGY

## 2024-09-19 PROCEDURE — 99213 OFFICE O/P EST LOW 20 MIN: CPT | Performed by: OBSTETRICS & GYNECOLOGY

## 2024-09-19 NOTE — LETTER
September 19, 2024     Bambi Foster DO  [address] [facility] [address]    Patient: Zhanna Torres   YOB: 1990   Date of Visit: 9/19/2024       Dear Dr. Foster:    Thank you for referring Zhanna Torres to me for evaluation. Below are my notes for this consultation.    If you have questions, please do not hesitate to call me. I look forward to following your patient along with you.         Sincerely,        Alex Hernandez MD        CC: No Recipients    Alex Hernandez MD  9/19/2024 12:57 PM  Sign when Signing Visit  Please refer to the [facility] ultrasound report in Ob Procedures for additional information regarding today's visit

## 2024-09-19 NOTE — PROGRESS NOTES
Ultrasound Probe Disinfection    A transvaginal ultrasound was performed.   Prior to use, disinfection was performed with High Level Disinfection Process ([email or web address]).  Probe serial number [id number] [facility]: [id number] was used.    Paulina Zuniga  09/19/24  12:40 PM

## 2024-09-19 NOTE — PROGRESS NOTES
Please refer to the [facility] ultrasound report in Ob Procedures for additional information regarding today's visit

## 2024-10-01 NOTE — TELEPHONE ENCOUNTER
19 weeks gestation of pregnancy  26yo  Will submit auth

## 2024-10-07 DIAGNOSIS — R06.2 WHEEZING: Primary | ICD-10-CM

## 2024-10-07 RX ORDER — ALBUTEROL SULFATE 90 UG/1
2 INHALANT RESPIRATORY (INHALATION) EVERY 6 HOURS PRN
Qty: 1 G | Refills: 0 | Status: SHIPPED | OUTPATIENT
Start: 2024-10-07

## 2024-10-08 ENCOUNTER — ROUTINE PRENATAL (OUTPATIENT)
Dept: OBGYN CLINIC | Facility: CLINIC | Age: 34
End: 2024-10-08
Payer: COMMERCIAL

## 2024-10-08 VITALS
WEIGHT: 154.8 LBS | BODY MASS INDEX: 25.76 KG/M2 | DIASTOLIC BLOOD PRESSURE: 62 MMHG | HEART RATE: 67 BPM | SYSTOLIC BLOOD PRESSURE: 120 MMHG | OXYGEN SATURATION: 98 %

## 2024-10-08 DIAGNOSIS — Z3A.23 23 WEEKS GESTATION OF PREGNANCY: Primary | ICD-10-CM

## 2024-10-08 DIAGNOSIS — Z34.92 PRENATAL CARE IN SECOND TRIMESTER: ICD-10-CM

## 2024-10-08 DIAGNOSIS — O09.892 SHORT INTERVAL BETWEEN PREGNANCIES COMPLICATING PREGNANCY, ANTEPARTUM, SECOND TRIMESTER: ICD-10-CM

## 2024-10-08 LAB
SL AMB  POCT GLUCOSE, UA: NORMAL
SL AMB POCT URINE PROTEIN: NORMAL

## 2024-10-08 PROCEDURE — 81002 URINALYSIS NONAUTO W/O SCOPE: CPT | Performed by: OBSTETRICS & GYNECOLOGY

## 2024-10-08 PROCEDURE — PNV: Performed by: OBSTETRICS & GYNECOLOGY

## 2024-10-08 NOTE — PROGRESS NOTES
Problem List Items Addressed This Visit       23 weeks gestation of pregnancy - Primary     Pt doing well today, recently had cold and now with lingering cough. Has been using inhaler. Denies sob or trouble breathing,  +FM. Denies ct, vb and lof.   Sp normal anatomy scan   Has 32w growth scan scheduled  28w labs ordered and reviewed today  Precautions reviewed. RTO in 4 weeks         Short interval between pregnancies complicating pregnancy, antepartum, second trimester     Has 32w growth scan scheduled          Other Visit Diagnoses       Prenatal care in second trimester        Relevant Orders    Anemia Panel w/Reflex, OB    CBC and differential    Glucose, 1H PG    RPR-Syphilis Screening (Total Syphilis IGG/IGM)    POCT urine dip            Zhanna Torres is a 34 y.o.  at 23w0d who presents today for routine prenatal visit.     /62   Pulse 67   Wt 70.2 kg (154 lb 12.8 oz)   LMP 2024 (Exact Date)   SpO2 98%   BMI 25.76 kg/m²

## 2024-10-08 NOTE — ASSESSMENT & PLAN NOTE
Pt doing well today, recently had cold and now with lingering cough. Has been using inhaler. Denies sob or trouble breathing,  +FM. Denies ct, vb and lof.   Sp normal anatomy scan   Has 32w growth scan scheduled  28w labs ordered and reviewed today  Precautions reviewed. RTO in 4 weeks

## 2024-10-08 NOTE — PROGRESS NOTES
Prenatal visit  GA  23w 0d   Denies any vaginal bleeding, Leaking of fluid or pelvic cramping  Normal  Fetal movement   Prenatal  labs completed on 8/14/2024  AFP complete  Level II complete on 9/19/2024  28 wk labs given   Growth scan scheduled   Flu vaccine- will get it at work

## 2024-10-29 ENCOUNTER — APPOINTMENT (OUTPATIENT)
Dept: LAB | Facility: CLINIC | Age: 34
End: 2024-10-29
Payer: COMMERCIAL

## 2024-10-29 DIAGNOSIS — Z34.92 PRENATAL CARE IN SECOND TRIMESTER: ICD-10-CM

## 2024-10-29 LAB
BASOPHILS # BLD AUTO: 0.04 THOUSANDS/ΜL (ref 0–0.1)
BASOPHILS NFR BLD AUTO: 1 % (ref 0–1)
EOSINOPHIL # BLD AUTO: 0.15 THOUSAND/ΜL (ref 0–0.61)
EOSINOPHIL NFR BLD AUTO: 2 % (ref 0–6)
ERYTHROCYTE [DISTWIDTH] IN BLOOD BY AUTOMATED COUNT: 13.4 % (ref 11.6–15.1)
GLUCOSE 1H P 50 G GLC PO SERPL-MCNC: 102 MG/DL (ref 70–134)
HCT VFR BLD AUTO: 36.1 % (ref 34.8–46.1)
HGB BLD-MCNC: 12.1 G/DL (ref 11.5–15.4)
IMM GRANULOCYTES # BLD AUTO: 0.06 THOUSAND/UL (ref 0–0.2)
IMM GRANULOCYTES NFR BLD AUTO: 1 % (ref 0–2)
LYMPHOCYTES # BLD AUTO: 1.71 THOUSANDS/ΜL (ref 0.6–4.47)
LYMPHOCYTES NFR BLD AUTO: 21 % (ref 14–44)
MCH RBC QN AUTO: 31.1 PG (ref 26.8–34.3)
MCHC RBC AUTO-ENTMCNC: 33.5 G/DL (ref 31.4–37.4)
MCV RBC AUTO: 93 FL (ref 82–98)
MONOCYTES # BLD AUTO: 0.45 THOUSAND/ΜL (ref 0.17–1.22)
MONOCYTES NFR BLD AUTO: 6 % (ref 4–12)
NEUTROPHILS # BLD AUTO: 5.79 THOUSANDS/ΜL (ref 1.85–7.62)
NEUTS SEG NFR BLD AUTO: 69 % (ref 43–75)
NRBC BLD AUTO-RTO: 0 /100 WBCS
PLATELET # BLD AUTO: 202 THOUSANDS/UL (ref 149–390)
PMV BLD AUTO: 10.7 FL (ref 8.9–12.7)
RBC # BLD AUTO: 3.89 MILLION/UL (ref 3.81–5.12)
TREPONEMA PALLIDUM IGG+IGM AB [PRESENCE] IN SERUM OR PLASMA BY IMMUNOASSAY: NORMAL
WBC # BLD AUTO: 8.2 THOUSAND/UL (ref 4.31–10.16)

## 2024-10-29 PROCEDURE — 86780 TREPONEMA PALLIDUM: CPT

## 2024-10-29 PROCEDURE — 85025 COMPLETE CBC W/AUTO DIFF WBC: CPT

## 2024-10-29 PROCEDURE — 36415 COLL VENOUS BLD VENIPUNCTURE: CPT

## 2024-11-08 ENCOUNTER — ROUTINE PRENATAL (OUTPATIENT)
Dept: OBGYN CLINIC | Facility: CLINIC | Age: 34
End: 2024-11-08
Payer: COMMERCIAL

## 2024-11-08 VITALS — SYSTOLIC BLOOD PRESSURE: 116 MMHG | DIASTOLIC BLOOD PRESSURE: 70 MMHG | WEIGHT: 159 LBS | BODY MASS INDEX: 26.46 KG/M2

## 2024-11-08 DIAGNOSIS — Z34.92 PRENATAL CARE IN SECOND TRIMESTER: Primary | ICD-10-CM

## 2024-11-08 DIAGNOSIS — Z3A.27 27 WEEKS GESTATION OF PREGNANCY: ICD-10-CM

## 2024-11-08 DIAGNOSIS — O09.892 SHORT INTERVAL BETWEEN PREGNANCIES COMPLICATING PREGNANCY, ANTEPARTUM, SECOND TRIMESTER: ICD-10-CM

## 2024-11-08 LAB
SL AMB  POCT GLUCOSE, UA: NEGATIVE
SL AMB POCT URINE PROTEIN: NORMAL

## 2024-11-08 PROCEDURE — 81002 URINALYSIS NONAUTO W/O SCOPE: CPT | Performed by: OBSTETRICS & GYNECOLOGY

## 2024-11-08 PROCEDURE — PNV: Performed by: OBSTETRICS & GYNECOLOGY

## 2024-11-08 NOTE — PROGRESS NOTES
Pt is here for routine ob visit   Shortness of breathe   Urine +1 protein/neg glucose   No LOF,VB,Contractions  +FM   UTD flu vaccine   28wk labs completed

## 2024-11-08 NOTE — ASSESSMENT & PLAN NOTE
Pt doing well today  +FM. Denies ctx, vb and lof  Some increasing SOB with increased activity, walking up stairs. No SOB at rest. Pt feels normal dyspnea of pregnancy, advised to continue monitoring  Otherwise up to date on care  Has f/u ultrasound at 32w for growth  Delivery consents next visit  Precautions reviewed. RTO in 2 weeks

## 2024-11-08 NOTE — PROGRESS NOTES
Problem List Items Addressed This Visit       27 weeks gestation of pregnancy     Pt doing well today  +FM. Denies ctx, vb and lof  Some increasing SOB with increased activity, walking up stairs. No SOB at rest. Pt feels normal dyspnea of pregnancy, advised to continue monitoring  Otherwise up to date on care  Has f/u ultrasound at 32w for growth  Delivery consents next visit  Precautions reviewed. RTO in 2 weeks         Short interval between pregnancies complicating pregnancy, antepartum, second trimester     32w growth u/s scheduled          Other Visit Diagnoses       Prenatal care in second trimester    -  Primary    Relevant Orders    POCT urine dip (Completed)            Zhannaedward Torres is a 34 y.o.  at 27w3d who presents today for routine prenatal visit.     /70 (BP Location: Right arm, Patient Position: Sitting, Cuff Size: Standard)   Wt 72.1 kg (159 lb)   LMP 2024 (Exact Date)   BMI 26.46 kg/m²

## 2024-11-21 ENCOUNTER — ROUTINE PRENATAL (OUTPATIENT)
Dept: OBGYN CLINIC | Facility: CLINIC | Age: 34
End: 2024-11-21
Payer: COMMERCIAL

## 2024-11-21 VITALS
DIASTOLIC BLOOD PRESSURE: 62 MMHG | HEART RATE: 77 BPM | SYSTOLIC BLOOD PRESSURE: 114 MMHG | OXYGEN SATURATION: 99 % | WEIGHT: 163.4 LBS | BODY MASS INDEX: 27.19 KG/M2

## 2024-11-21 DIAGNOSIS — Z34.93 PRENATAL CARE, THIRD TRIMESTER: Primary | ICD-10-CM

## 2024-11-21 DIAGNOSIS — O09.892 SHORT INTERVAL BETWEEN PREGNANCIES COMPLICATING PREGNANCY, ANTEPARTUM, SECOND TRIMESTER: ICD-10-CM

## 2024-11-21 DIAGNOSIS — J45.20 MILD INTERMITTENT ASTHMA WITHOUT COMPLICATION: ICD-10-CM

## 2024-11-21 DIAGNOSIS — Z3A.29 29 WEEKS GESTATION OF PREGNANCY: ICD-10-CM

## 2024-11-21 DIAGNOSIS — Z23 NEED FOR TDAP VACCINATION: ICD-10-CM

## 2024-11-21 PROBLEM — Z36.86 ENCOUNTER FOR ANTENATAL SCREENING FOR CERVICAL LENGTH: Status: RESOLVED | Noted: 2024-09-19 | Resolved: 2024-11-21

## 2024-11-21 LAB
DME PARACHUTE DELIVERY DATE REQUESTED: NORMAL
DME PARACHUTE ITEM DESCRIPTION: NORMAL
DME PARACHUTE ORDER STATUS: NORMAL
DME PARACHUTE SUPPLIER NAME: NORMAL
DME PARACHUTE SUPPLIER PHONE: NORMAL
SL AMB  POCT GLUCOSE, UA: NEGATIVE
SL AMB POCT URINE PROTEIN: NEGATIVE

## 2024-11-21 PROCEDURE — PNV: Performed by: OBSTETRICS & GYNECOLOGY

## 2024-11-21 PROCEDURE — 81002 URINALYSIS NONAUTO W/O SCOPE: CPT | Performed by: OBSTETRICS & GYNECOLOGY

## 2024-11-21 NOTE — ASSESSMENT & PLAN NOTE
Yellow folder given - aware nurses will call.  S/p flu vaccine.  Tdap next visit.  28 week labs reviewed - normal. T+S: A pos  Breast pump ordered.  Various routes of delivery reviewed including risks/benefits of each.  All questions answered, and consent signed.  Kick counts and  precautions reviewed.

## 2024-11-21 NOTE — PROGRESS NOTES
Prenatal visit @ 29w2d. Denies ctxs, VB, LOF.  Good FM.  Feels some dyspnea but not anemic.      Problem List Items Addressed This Visit       Asthma    Has albuterol for PRN use.         29 weeks gestation of pregnancy    Yellow folder given - aware nurses will call.  S/p flu vaccine.  Tdap next visit.  28 week labs reviewed - normal. T+S: A pos  Breast pump ordered.  Various routes of delivery reviewed including risks/benefits of each.  All questions answered, and consent signed.  Kick counts and  precautions reviewed.          Short interval between pregnancies complicating pregnancy, antepartum, second trimester    32 week growth scan scheduled for 24.           Other Visit Diagnoses         Need for Tdap vaccination    -  Primary      Prenatal care, third trimester        Relevant Orders    POCT urine dip (Completed)

## 2024-11-26 LAB
DME PARACHUTE DELIVERY DATE ACTUAL: NORMAL
DME PARACHUTE DELIVERY DATE REQUESTED: NORMAL
DME PARACHUTE ITEM DESCRIPTION: NORMAL
DME PARACHUTE ORDER STATUS: NORMAL
DME PARACHUTE SUPPLIER NAME: NORMAL
DME PARACHUTE SUPPLIER PHONE: NORMAL

## 2024-12-05 ENCOUNTER — ROUTINE PRENATAL (OUTPATIENT)
Dept: OBGYN CLINIC | Facility: CLINIC | Age: 34
End: 2024-12-05
Payer: COMMERCIAL

## 2024-12-05 VITALS
BODY MASS INDEX: 28.02 KG/M2 | DIASTOLIC BLOOD PRESSURE: 76 MMHG | WEIGHT: 168.4 LBS | HEART RATE: 85 BPM | SYSTOLIC BLOOD PRESSURE: 104 MMHG | OXYGEN SATURATION: 99 %

## 2024-12-05 DIAGNOSIS — O09.892 SHORT INTERVAL BETWEEN PREGNANCIES COMPLICATING PREGNANCY, ANTEPARTUM, SECOND TRIMESTER: ICD-10-CM

## 2024-12-05 DIAGNOSIS — Z3A.31 31 WEEKS GESTATION OF PREGNANCY: ICD-10-CM

## 2024-12-05 DIAGNOSIS — Z34.83 PRENATAL CARE, SUBSEQUENT PREGNANCY IN THIRD TRIMESTER: Primary | ICD-10-CM

## 2024-12-05 LAB
SL AMB  POCT GLUCOSE, UA: NORMAL
SL AMB POCT URINE PROTEIN: NORMAL

## 2024-12-05 PROCEDURE — 81002 URINALYSIS NONAUTO W/O SCOPE: CPT | Performed by: STUDENT IN AN ORGANIZED HEALTH CARE EDUCATION/TRAINING PROGRAM

## 2024-12-05 PROCEDURE — PNV: Performed by: STUDENT IN AN ORGANIZED HEALTH CARE EDUCATION/TRAINING PROGRAM

## 2024-12-05 NOTE — PROGRESS NOTES
31 weeks gestation of pregnancy  35 yo here for ob visit.  at 31+2. No contractions, leaking or bleeding. Good fetal movement. Plan tdap next visit. Reviewed RSV offering at next visit. Return in 2 wks.    Short interval between pregnancies complicating pregnancy, antepartum, second trimester  Follow up 32 wk growth

## 2024-12-05 NOTE — ASSESSMENT & PLAN NOTE
33 yo here for ob visit.  at 31+2. No contractions, leaking or bleeding. Good fetal movement. Plan tdap next visit. Reviewed RSV offering at next visit. Return in 2 wks.

## 2024-12-05 NOTE — PROGRESS NOTES
Prenatal visit  GA 31w 2d  Denies any vaginal bleeding, Leaking of fluid or pelvic cramping   Normal Fetal movement.   28 wk labs completed   Tdap next visit. S/p flu vaccine through [facility].   Delivery consent signed on 11/25/2024  Breast pump ordered previously.   Will bring birth plan to next visit.

## 2024-12-12 ENCOUNTER — TELEPHONE (OUTPATIENT)
Dept: OBGYN CLINIC | Facility: CLINIC | Age: 34
End: 2024-12-12

## 2024-12-12 ENCOUNTER — ULTRASOUND (OUTPATIENT)
Facility: HOSPITAL | Age: 34
End: 2024-12-12
Payer: COMMERCIAL

## 2024-12-12 VITALS
WEIGHT: 170.64 LBS | BODY MASS INDEX: 28.43 KG/M2 | HEART RATE: 91 BPM | SYSTOLIC BLOOD PRESSURE: 112 MMHG | HEIGHT: 65 IN | DIASTOLIC BLOOD PRESSURE: 60 MMHG

## 2024-12-12 DIAGNOSIS — Z3A.32 32 WEEKS GESTATION OF PREGNANCY: Primary | ICD-10-CM

## 2024-12-12 DIAGNOSIS — Z36.89 ENCOUNTER FOR ULTRASOUND TO ASSESS FETAL GROWTH: ICD-10-CM

## 2024-12-12 DIAGNOSIS — O09.892 SHORT INTERVAL BETWEEN PREGNANCIES COMPLICATING PREGNANCY, ANTEPARTUM, SECOND TRIMESTER: ICD-10-CM

## 2024-12-12 DIAGNOSIS — O09.523 AMA (ADVANCED MATERNAL AGE) MULTIGRAVIDA 35+, THIRD TRIMESTER: ICD-10-CM

## 2024-12-12 PROCEDURE — 99213 OFFICE O/P EST LOW 20 MIN: CPT | Performed by: OBSTETRICS & GYNECOLOGY

## 2024-12-12 PROCEDURE — 76816 OB US FOLLOW-UP PER FETUS: CPT | Performed by: OBSTETRICS & GYNECOLOGY

## 2024-12-12 NOTE — LETTER
"  Date: 2024    Evelin Mota DO  [address]  First Floor  [place] [address]    Patient: Zhanna Torres   YOB: 1990   Date of Visit: 2024   Gestational age 32w2d   Nature of this communication: Routine       This patient was seen recently in our  office.  Please see ultrasound report under \"OB Procedures\" tab.  Please don't hesitate to contact our office with any concerns or questions.      Sincerely,      Shima Robles MD  Attending Physician, Maternal-Fetal Medicine  [facility]       "

## 2024-12-12 NOTE — PROGRESS NOTES
"[facility]: Zhanna Torres was seen today at 32w2d for fetal growth assessment ultrasound.  See ultrasound report under \"OB Procedures\" tab.  Please don't hesitate to contact our office with any concerns or questions.  -Shima Robles MD    "

## 2024-12-12 NOTE — TELEPHONE ENCOUNTER
----- Message from Marj HILL sent at 7/15/2024  9:16 AM EDT -----  Regarding: 3rd Trimester Call Due  11/26-12/24

## 2024-12-17 ENCOUNTER — ROUTINE PRENATAL (OUTPATIENT)
Dept: OBGYN CLINIC | Facility: CLINIC | Age: 34
End: 2024-12-17
Payer: COMMERCIAL

## 2024-12-17 VITALS — HEART RATE: 82 BPM | SYSTOLIC BLOOD PRESSURE: 110 MMHG | DIASTOLIC BLOOD PRESSURE: 72 MMHG | OXYGEN SATURATION: 97 %

## 2024-12-17 DIAGNOSIS — O09.893 SHORT INTERVAL BETWEEN PREGNANCIES AFFECTING PREGNANCY IN THIRD TRIMESTER, ANTEPARTUM: ICD-10-CM

## 2024-12-17 DIAGNOSIS — Z34.83 PRENATAL CARE, SUBSEQUENT PREGNANCY IN THIRD TRIMESTER: ICD-10-CM

## 2024-12-17 DIAGNOSIS — Z23 ENCOUNTER FOR IMMUNIZATION: ICD-10-CM

## 2024-12-17 DIAGNOSIS — Z3A.32 32 WEEKS GESTATION OF PREGNANCY: Primary | ICD-10-CM

## 2024-12-17 PROBLEM — Z3A.33 33 WEEKS GESTATION OF PREGNANCY: Status: ACTIVE | Noted: 2024-08-13

## 2024-12-17 PROCEDURE — 90715 TDAP VACCINE 7 YRS/> IM: CPT | Performed by: OBSTETRICS & GYNECOLOGY

## 2024-12-17 PROCEDURE — 90471 IMMUNIZATION ADMIN: CPT | Performed by: OBSTETRICS & GYNECOLOGY

## 2024-12-17 PROCEDURE — PNV: Performed by: OBSTETRICS & GYNECOLOGY

## 2024-12-17 NOTE — PROGRESS NOTES
Prenatal visit  GA 33w 0d  Denies any vaginal bleeding, Leaking of fluid or pelvic cramping. Patient reports occasional BHX cramping.   Normal Fetal movement.   28 wk labs completed WNL  Tdap vaccine administered today. S/p flu vaccine through [facility].   Delivery consent signed on 11/21/2024  Breast pump ordered previously  GBS collect at next visit.   Patient unable to void at today's visit.

## 2024-12-17 NOTE — ASSESSMENT & PLAN NOTE
Pt doing well today, no complaints  +FM. Denies ctx, vb and lof   Tdap given today. Sp flu  RSV vaccine reviewed- declines  GBS at 36w  Precautions reviewed- RTO in 2 weeks

## 2024-12-17 NOTE — PROGRESS NOTES
Problem List Items Addressed This Visit       33 weeks gestation of pregnancy - Primary    Pt doing well today, no complaints  +FM. Denies ctx, vb and lof   Tdap given today. Sp flu  RSV vaccine reviewed- declines  GBS at 36w  Precautions reviewed- RTO in 2 weeks         Short interval between pregnancies complicating pregnancy, antepartum, second trimester    Growth u/s on  EFW 2145 grams - 4 lbs 12 oz (69%)           Other Visit Diagnoses         Prenatal care, subsequent pregnancy in third trimester          Encounter for immunization        Relevant Orders    Tdap Vaccine greater than or equal to 8yo (Completed)            Zhanna GOLD Torres is a 34 y.o.  at 33w0d who presents today for routine prenatal visit.     /72 (BP Location: Left arm, Patient Position: Sitting, Cuff Size: Standard)   Pulse 82   LMP 2024 (Exact Date)   SpO2 97%       FH 33 cm

## 2024-12-17 NOTE — PROGRESS NOTES
Tdap Vaccine Administration  VIS Given.   L Deltoid.   Tolerated well.  Lot#: [id number]  Exp: 08/31/2026

## 2025-01-02 ENCOUNTER — ROUTINE PRENATAL (OUTPATIENT)
Dept: OBGYN CLINIC | Facility: CLINIC | Age: 35
End: 2025-01-02
Payer: COMMERCIAL

## 2025-01-02 VITALS
DIASTOLIC BLOOD PRESSURE: 64 MMHG | HEART RATE: 92 BPM | BODY MASS INDEX: 28.66 KG/M2 | OXYGEN SATURATION: 97 % | WEIGHT: 172.2 LBS | SYSTOLIC BLOOD PRESSURE: 102 MMHG

## 2025-01-02 DIAGNOSIS — Z34.83 PRENATAL CARE, SUBSEQUENT PREGNANCY IN THIRD TRIMESTER: Primary | ICD-10-CM

## 2025-01-02 DIAGNOSIS — O09.892 SHORT INTERVAL BETWEEN PREGNANCIES COMPLICATING PREGNANCY, ANTEPARTUM, SECOND TRIMESTER: ICD-10-CM

## 2025-01-02 DIAGNOSIS — Z3A.35 35 WEEKS GESTATION OF PREGNANCY: ICD-10-CM

## 2025-01-02 LAB
SL AMB  POCT GLUCOSE, UA: NEGATIVE
SL AMB POCT URINE PROTEIN: POSITIVE

## 2025-01-02 PROCEDURE — 81002 URINALYSIS NONAUTO W/O SCOPE: CPT | Performed by: OBSTETRICS & GYNECOLOGY

## 2025-01-02 PROCEDURE — PNV: Performed by: OBSTETRICS & GYNECOLOGY

## 2025-01-05 PROBLEM — Z3A.35 35 WEEKS GESTATION OF PREGNANCY: Status: ACTIVE | Noted: 2024-08-13

## 2025-01-05 NOTE — ASSESSMENT & PLAN NOTE
Denies any vaginal bleeding, leakage for fluid.  Patient reports occasional contractions intermittently. Offered SVE which she declines. Labor precautions reviewed.  +Fetal movement   S/p Tdap and Flu.   breast pump delivered

## 2025-01-05 NOTE — PROGRESS NOTES
Problem List Items Addressed This Visit     35 weeks gestation of pregnancy    Denies any vaginal bleeding, leakage for fluid.  Patient reports occasional contractions intermittently. Offered SVE which she declines. Labor precautions reviewed.  +Fetal movement   S/p Tdap and Flu.   breast pump delivered             Short interval between pregnancies complicating pregnancy, antepartum, second trimester    Growth u/s on 12/12 EFW 2145 grams - 4 lbs 12 oz (69%)           Other Visit Diagnoses       Prenatal care, subsequent pregnancy in third trimester    -  Primary    Relevant Orders    POCT urine dip (Completed)

## 2025-01-09 ENCOUNTER — ROUTINE PRENATAL (OUTPATIENT)
Dept: OBGYN CLINIC | Facility: CLINIC | Age: 35
End: 2025-01-09
Payer: COMMERCIAL

## 2025-01-09 VITALS
HEART RATE: 89 BPM | DIASTOLIC BLOOD PRESSURE: 60 MMHG | SYSTOLIC BLOOD PRESSURE: 100 MMHG | OXYGEN SATURATION: 99 % | BODY MASS INDEX: 28.79 KG/M2 | WEIGHT: 173 LBS

## 2025-01-09 DIAGNOSIS — O09.892 SHORT INTERVAL BETWEEN PREGNANCIES COMPLICATING PREGNANCY, ANTEPARTUM, SECOND TRIMESTER: ICD-10-CM

## 2025-01-09 DIAGNOSIS — Z3A.36 36 WEEKS GESTATION OF PREGNANCY: ICD-10-CM

## 2025-01-09 DIAGNOSIS — Z34.83 PRENATAL CARE, SUBSEQUENT PREGNANCY IN THIRD TRIMESTER: Primary | ICD-10-CM

## 2025-01-09 LAB
SL AMB  POCT GLUCOSE, UA: NEGATIVE
SL AMB POCT URINE PROTEIN: NEGATIVE

## 2025-01-09 PROCEDURE — 87150 DNA/RNA AMPLIFIED PROBE: CPT | Performed by: OBSTETRICS & GYNECOLOGY

## 2025-01-09 PROCEDURE — 81002 URINALYSIS NONAUTO W/O SCOPE: CPT | Performed by: OBSTETRICS & GYNECOLOGY

## 2025-01-09 PROCEDURE — PNV: Performed by: OBSTETRICS & GYNECOLOGY

## 2025-01-09 NOTE — PROGRESS NOTES
Prenatal visit @ 36w2d.  Some  ctxs but nothing concerning.  Denies VB, LOF.  Good FM.  Sciatica is becoming more of a problem but declines 39 week induction.      Problem List Items Addressed This Visit       36 weeks gestation of pregnancy    GBS collected today.  Birth certificate filled out.  Car seat installed.   Will hand in birth plan today.  Labor precautions reviewed - declines 39 week induction at this time.           Short interval between pregnancies complicating pregnancy, antepartum, second trimester    Growth scan at 32 weeks showed EFW 69%.         Prenatal care, subsequent pregnancy in third trimester - Primary    Relevant Orders    Strep B DNA probe, amplification    POCT urine dip

## 2025-01-09 NOTE — ASSESSMENT & PLAN NOTE
GBS collected today.  Birth certificate filled out.  Car seat installed.   Will hand in birth plan today.  Labor precautions reviewed - declines 39 week induction at this time.

## 2025-01-13 ENCOUNTER — RESULTS FOLLOW-UP (OUTPATIENT)
Dept: OTHER | Facility: HOSPITAL | Age: 35
End: 2025-01-13

## 2025-01-13 LAB — GP B STREP DNA SPEC QL NAA+PROBE: NEGATIVE

## 2025-01-15 ENCOUNTER — ROUTINE PRENATAL (OUTPATIENT)
Dept: OBGYN CLINIC | Facility: CLINIC | Age: 35
End: 2025-01-15
Payer: COMMERCIAL

## 2025-01-15 VITALS
HEART RATE: 89 BPM | WEIGHT: 174.2 LBS | SYSTOLIC BLOOD PRESSURE: 108 MMHG | OXYGEN SATURATION: 99 % | DIASTOLIC BLOOD PRESSURE: 66 MMHG | BODY MASS INDEX: 28.99 KG/M2

## 2025-01-15 DIAGNOSIS — Z3A.37 37 WEEKS GESTATION OF PREGNANCY: ICD-10-CM

## 2025-01-15 DIAGNOSIS — Z34.83 PRENATAL CARE, SUBSEQUENT PREGNANCY IN THIRD TRIMESTER: Primary | ICD-10-CM

## 2025-01-15 LAB
SL AMB  POCT GLUCOSE, UA: NORMAL
SL AMB POCT URINE PROTEIN: NORMAL

## 2025-01-15 PROCEDURE — 81002 URINALYSIS NONAUTO W/O SCOPE: CPT | Performed by: STUDENT IN AN ORGANIZED HEALTH CARE EDUCATION/TRAINING PROGRAM

## 2025-01-15 PROCEDURE — PNV: Performed by: STUDENT IN AN ORGANIZED HEALTH CARE EDUCATION/TRAINING PROGRAM

## 2025-01-15 NOTE — ASSESSMENT & PLAN NOTE
35yo  at 37.1wks presents for routine prenatal visit     Pt denies contractions, vaginal bleeding, leakage of fluid. Endorses fetal movement.    -continue PNVs   -s/p tdap and flu vaccine   -GBS negative   -discussed elective IOL vs spontaneous labor. Pt would like to go into labor naturally at this time   -labor precautions provided  -return in 1 week    No

## 2025-01-15 NOTE — PROGRESS NOTES
37 weeks gestation of pregnancy  33yo  at 37.1wks presents for routine prenatal visit     Pt denies contractions, vaginal bleeding, leakage of fluid. Endorses fetal movement.    -continue PNVs   -s/p tdap and flu vaccine   -GBS negative   -discussed elective IOL vs spontaneous labor. Pt would like to go into labor naturally at this time   -labor precautions provided  -return in 1 week

## 2025-01-17 ENCOUNTER — NURSE TRIAGE (OUTPATIENT)
Age: 35
End: 2025-01-17

## 2025-01-17 NOTE — TELEPHONE ENCOUNTER
"Spoke with patient who is 37w3d, .  She reports she had just got back in the car when she started feeling dizzy like she was going to pass out.  She drank some water and rolled the window down and it passed.  She reports a  little less fluid intake then normal but has had about 40oz today.  She reports HR 90's to 100.  She reports BP normal as well. She denies vaginal bleeding/LOF, CP or fever. Advised +FM.  She advised it was brief and passed.  Patient was advised to increase fluids and continue to monitor.      SEC to on call provider. Provider agreed with recommendations.       Return call to patient to advise.  Also advised pt to call back if vaginal bleeding/LOF, abd pain or decreased fetal movement.  Pt verbalized understanding, no further questions or concerns at this time.         Reason for Disposition   MODERATE dizziness (e.g., interferes with normal activities)  (Exception: Dizziness caused by heat exposure, sudden standing, or poor fluid intake.)    Answer Assessment - Initial Assessment Questions  1. DESCRIPTION: \"Describe your dizziness.\"      Felt like the blood drained out of head.  2. LIGHTHEADED: \"Do you feel lightheaded?\" (e.g., somewhat faint, woozy, weak upon standing)      Somewhat faint but did not synopsize.   3. VERTIGO: \"Do you feel like either you or the room is spinning or tilting?\" (i.e., vertigo)      denies  4. SEVERITY: \"How bad is it?\"  \"Do you feel like you are going to faint?\" \"Can you stand and walk?\"      Has passed now.  5. ONSET:  \"When did the dizziness begin?\"      30 mins ago - very brief episode.  6. AGGRAVATING FACTORS: \"Does anything make it worse?\" (e.g., standing, change in head position)      denies  7. HEART RATE: \"Can you tell me your heart rate?\" \"How many beats in 15 seconds?\"  (Note: Not all patients can do this.)        90's  8. CAUSE: \"What do you think is causing the dizziness?\" (e.g., decreased fluids or food, diarrhea, emotional distress, heat exposure, " "new medicine, sudden standing, vomiting; unknown)      Unsure, maybe a little less fluids than usual but about 40oz.   9. RECURRENT SYMPTOM: \"Have you had dizziness before?\" If Yes, ask: \"When was the last time?\" \"What happened that time?\"      Last pregnancy but earlier on around 20w.   10. OTHER SYMPTOMS: \"Do you have any other symptoms?\" (e.g., fever, chest pain, vomiting, diarrhea, bleeding)        denies  11. PREGNANCY: \"Is there any chance you are pregnant?\" \"When was your last menstrual period?\"        37w3d    Protocols used: Dizziness-Adult-[place]    "

## 2025-01-22 ENCOUNTER — ROUTINE PRENATAL (OUTPATIENT)
Dept: OBGYN CLINIC | Facility: CLINIC | Age: 35
End: 2025-01-22
Payer: COMMERCIAL

## 2025-01-22 VITALS
WEIGHT: 174.8 LBS | SYSTOLIC BLOOD PRESSURE: 108 MMHG | HEART RATE: 110 BPM | BODY MASS INDEX: 29.09 KG/M2 | OXYGEN SATURATION: 96 % | DIASTOLIC BLOOD PRESSURE: 78 MMHG

## 2025-01-22 DIAGNOSIS — Z34.83 PRENATAL CARE, SUBSEQUENT PREGNANCY IN THIRD TRIMESTER: Primary | ICD-10-CM

## 2025-01-22 DIAGNOSIS — R00.0 TACHYCARDIA: ICD-10-CM

## 2025-01-22 DIAGNOSIS — Z3A.38 38 WEEKS GESTATION OF PREGNANCY: ICD-10-CM

## 2025-01-22 DIAGNOSIS — O09.892 SHORT INTERVAL BETWEEN PREGNANCIES COMPLICATING PREGNANCY, ANTEPARTUM, SECOND TRIMESTER: ICD-10-CM

## 2025-01-22 LAB
SL AMB  POCT GLUCOSE, UA: NEGATIVE
SL AMB POCT URINE PROTEIN: NEGATIVE

## 2025-01-22 PROCEDURE — PNV: Performed by: OBSTETRICS & GYNECOLOGY

## 2025-01-22 PROCEDURE — 81002 URINALYSIS NONAUTO W/O SCOPE: CPT | Performed by: OBSTETRICS & GYNECOLOGY

## 2025-01-22 NOTE — ASSESSMENT & PLAN NOTE
Vertex confirmed on US.  Normal fetal movement occasional; occasional [place] contractions. Declined cervical exam today.  Labor precautions reviewed.  Desires spontaneous onset of labor.  She did have known influenza exposure 4 days ago.  Declines Tamiflu which was offered today.  Asymptomatic.

## 2025-01-22 NOTE — PROGRESS NOTES
Problem List Items Addressed This Visit     38 weeks gestation of pregnancy    Vertex confirmed on US.  Normal fetal movement occasional; occasional [place] contractions. Declined cervical exam today.  Labor precautions reviewed.  Desires spontaneous onset of labor.  She did have known influenza exposure 4 days ago.  Declines Tamiflu which was offered today.  Asymptomatic.         Relevant Orders    POCT urine dip (Completed)    Short interval between pregnancies complicating pregnancy, antepartum, second trimester    RESOLVED: Prenatal care, subsequent pregnancy in third trimester - Primary    Relevant Orders    POCT urine dip (Completed)    Tachycardia    She had an episode on Friday where she felt very lightheaded and close to syncope.  She previously had a cardiac workup with her last pregnancy after similar symptoms.  The symptoms started around 20 weeks last pregnancy and therefore she had a full cardiac workup including negative echo.  She had the symptoms resolved when she did IV Venofer as her iron was very low last pregnancy.  She was currently feeling well until about 2 weeks ago when she started feeling worse this pregnancy.  We discussed getting labs.  Her blood pressure is normal.  She does have a blood pressure cuff at home.  We also discussed getting an EKG as she is getting tachycardic with exertion and can be tachycardic at rest (110bpm today).  Reviewed that if she is feeling unwell or has recurrent palpitations that she needs to go to [place].     /78 (BP Location: Left arm, Patient Position: Sitting, Cuff Size: Standard)   Pulse (!) 110   Wt 79.3 kg (174 lb 12.8 oz)   LMP 04/30/2024 (Exact Date)   SpO2 96%   BMI 29.09 kg/m²            Relevant Orders    ECG 12 lead    CBC and Platelet    Iron Panel (Includes Ferritin, Iron Sat%, Iron, and TIBC)    Comprehensive metabolic panel

## 2025-01-22 NOTE — ASSESSMENT & PLAN NOTE
She had an episode on Friday where she felt very lightheaded and close to syncope.  She previously had a cardiac workup with her last pregnancy after similar symptoms.  The symptoms started around 20 weeks last pregnancy and therefore she had a full cardiac workup including negative echo.  She had the symptoms resolved when she did IV Venofer as her iron was very low last pregnancy.  She was currently feeling well until about 2 weeks ago when she started feeling worse this pregnancy.  We discussed getting labs.  Her blood pressure is normal.  She does have a blood pressure cuff at home.  We also discussed getting an EKG as she is getting tachycardic with exertion and can be tachycardic at rest (110bpm today).  Reviewed that if she is feeling unwell or has recurrent palpitations that she needs to go to [place].     /78 (BP Location: Left arm, Patient Position: Sitting, Cuff Size: Standard)   Pulse (!) 110   Wt 79.3 kg (174 lb 12.8 oz)   LMP 04/30/2024 (Exact Date)   SpO2 96%   BMI 29.09 kg/m²

## 2025-01-25 ENCOUNTER — APPOINTMENT (OUTPATIENT)
Dept: LAB | Facility: CLINIC | Age: 35
End: 2025-01-25
Payer: COMMERCIAL

## 2025-01-25 DIAGNOSIS — R00.0 TACHYCARDIA: ICD-10-CM

## 2025-01-25 LAB
ALBUMIN SERPL BCG-MCNC: 3.4 G/DL (ref 3.5–5)
ALP SERPL-CCNC: 105 U/L (ref 34–104)
ALT SERPL W P-5'-P-CCNC: 8 U/L (ref 7–52)
ANION GAP SERPL CALCULATED.3IONS-SCNC: 6 MMOL/L (ref 4–13)
AST SERPL W P-5'-P-CCNC: 13 U/L (ref 13–39)
BILIRUB SERPL-MCNC: 0.43 MG/DL (ref 0.2–1)
BUN SERPL-MCNC: 8 MG/DL (ref 5–25)
CALCIUM ALBUM COR SERPL-MCNC: 9.1 MG/DL (ref 8.3–10.1)
CALCIUM SERPL-MCNC: 8.6 MG/DL (ref 8.4–10.2)
CHLORIDE SERPL-SCNC: 105 MMOL/L (ref 96–108)
CO2 SERPL-SCNC: 25 MMOL/L (ref 21–32)
CREAT SERPL-MCNC: 0.37 MG/DL (ref 0.6–1.3)
ERYTHROCYTE [DISTWIDTH] IN BLOOD BY AUTOMATED COUNT: 14.1 % (ref 11.6–15.1)
FERRITIN SERPL-MCNC: 6 NG/ML (ref 11–307)
GFR SERPL CREATININE-BSD FRML MDRD: 137 ML/MIN/1.73SQ M
GLUCOSE P FAST SERPL-MCNC: 82 MG/DL (ref 65–99)
HCT VFR BLD AUTO: 34.8 % (ref 34.8–46.1)
HGB BLD-MCNC: 11.3 G/DL (ref 11.5–15.4)
IRON SATN MFR SERPL: 9 % (ref 15–50)
IRON SERPL-MCNC: 52 UG/DL (ref 50–212)
MCH RBC QN AUTO: 28.5 PG (ref 26.8–34.3)
MCHC RBC AUTO-ENTMCNC: 32.5 G/DL (ref 31.4–37.4)
MCV RBC AUTO: 88 FL (ref 82–98)
PLATELET # BLD AUTO: 237 THOUSANDS/UL (ref 149–390)
PMV BLD AUTO: 10.3 FL (ref 8.9–12.7)
POTASSIUM SERPL-SCNC: 4.1 MMOL/L (ref 3.5–5.3)
PROT SERPL-MCNC: 6.2 G/DL (ref 6.4–8.4)
RBC # BLD AUTO: 3.97 MILLION/UL (ref 3.81–5.12)
SODIUM SERPL-SCNC: 136 MMOL/L (ref 135–147)
TIBC SERPL-MCNC: 581 UG/DL (ref 250–450)
TRANSFERRIN SERPL-MCNC: 415 MG/DL (ref 203–362)
UIBC SERPL-MCNC: 529 UG/DL (ref 155–355)
WBC # BLD AUTO: 8.74 THOUSAND/UL (ref 4.31–10.16)

## 2025-01-25 PROCEDURE — 82728 ASSAY OF FERRITIN: CPT

## 2025-01-25 PROCEDURE — 80053 COMPREHEN METABOLIC PANEL: CPT

## 2025-01-25 PROCEDURE — 83540 ASSAY OF IRON: CPT

## 2025-01-25 PROCEDURE — 85027 COMPLETE CBC AUTOMATED: CPT

## 2025-01-25 PROCEDURE — 36415 COLL VENOUS BLD VENIPUNCTURE: CPT

## 2025-01-25 PROCEDURE — 83550 IRON BINDING TEST: CPT

## 2025-01-27 ENCOUNTER — RESULTS FOLLOW-UP (OUTPATIENT)
Dept: OTHER | Facility: HOSPITAL | Age: 35
End: 2025-01-27

## 2025-01-27 ENCOUNTER — TELEPHONE (OUTPATIENT)
Age: 35
End: 2025-01-27

## 2025-01-27 NOTE — TELEPHONE ENCOUNTER
Called patient to determine which infusion center she would like to go to. LVM requesting a call back.

## 2025-01-27 NOTE — TELEPHONE ENCOUNTER
Pt called in stating she had labs done on 1/25/25. She is wondering about her results/if she will need iron infusions prior to giving birth. Advised results are not yet reviewed but pt will be notified once they are. Pt thankful.

## 2025-01-27 NOTE — TELEPHONE ENCOUNTER
I looked over her results. Since she is symptomatic, please let her know that we can do venofer infusions. Please set these up or inform me on the protocol to do so.

## 2025-01-27 NOTE — TELEPHONE ENCOUNTER
Patient returned call, informed of response and recommendations. Patient would like to go to [facility].     Venofer infusion order pended and sent to provider for signing.

## 2025-01-28 ENCOUNTER — TELEPHONE (OUTPATIENT)
Age: 35
End: 2025-01-28

## 2025-01-28 DIAGNOSIS — Z3A.38 38 WEEKS GESTATION OF PREGNANCY: Primary | ICD-10-CM

## 2025-01-28 LAB
ATRIAL RATE: 105 BPM
P AXIS: 16 DEGREES
PR INTERVAL: 120 MS
QRS AXIS: 70 DEGREES
QRSD INTERVAL: 76 MS
QT INTERVAL: 342 MS
QTC INTERVAL: 452 MS
T WAVE AXIS: -25 DEGREES
VENTRICULAR RATE: 105 BPM

## 2025-01-28 PROCEDURE — 93010 ELECTROCARDIOGRAM REPORT: CPT | Performed by: INTERNAL MEDICINE

## 2025-01-28 RX ORDER — SODIUM CHLORIDE 9 MG/ML
20 INJECTION, SOLUTION INTRAVENOUS ONCE
Status: CANCELLED | OUTPATIENT
Start: 2025-01-31

## 2025-01-28 NOTE — TELEPHONE ENCOUNTER
Orders signed by Dr. Mota. High priority in basket message message to office clerical pool to call patient asap and assist with scheduling first infusion.

## 2025-01-28 NOTE — TELEPHONE ENCOUNTER
Zhanna called stating she would like to be scheduled for iron infusions as soon as possible.  Currently 39w0d and would like to feel better as soon as possible.     Advised order pending provider signature. She would like any available provider to sign off on order so she can be scheduled ASAP.      Call escalated to RN to advise of patient request.

## 2025-01-28 NOTE — TELEPHONE ENCOUNTER
Patient calling to follow up with iron infusion orders that are pending. Reviewed waiting for sign off from provider currently and OB navigator sent a message to Dr. Mclain about 1 hr ago to follow up and get them signed. Advised to call back this afternoon to follow up if she has not been contact before then. Patient verbalizes understanding.

## 2025-01-29 ENCOUNTER — ROUTINE PRENATAL (OUTPATIENT)
Dept: OBGYN CLINIC | Facility: CLINIC | Age: 35
End: 2025-01-29
Payer: COMMERCIAL

## 2025-01-29 VITALS — BODY MASS INDEX: 29.45 KG/M2 | WEIGHT: 177 LBS | DIASTOLIC BLOOD PRESSURE: 74 MMHG | SYSTOLIC BLOOD PRESSURE: 110 MMHG

## 2025-01-29 DIAGNOSIS — Z3A.39 39 WEEKS GESTATION OF PREGNANCY: ICD-10-CM

## 2025-01-29 DIAGNOSIS — Z34.83 PRENATAL CARE, SUBSEQUENT PREGNANCY, THIRD TRIMESTER: Primary | ICD-10-CM

## 2025-01-29 DIAGNOSIS — O09.892 SHORT INTERVAL BETWEEN PREGNANCIES COMPLICATING PREGNANCY, ANTEPARTUM, SECOND TRIMESTER: ICD-10-CM

## 2025-01-29 LAB
SL AMB  POCT GLUCOSE, UA: NEGATIVE
SL AMB POCT URINE PROTEIN: ABNORMAL

## 2025-01-29 PROCEDURE — PNV: Performed by: STUDENT IN AN ORGANIZED HEALTH CARE EDUCATION/TRAINING PROGRAM

## 2025-01-29 PROCEDURE — 81002 URINALYSIS NONAUTO W/O SCOPE: CPT | Performed by: STUDENT IN AN ORGANIZED HEALTH CARE EDUCATION/TRAINING PROGRAM

## 2025-01-29 NOTE — TELEPHONE ENCOUNTER
1/31 9:30  2/3 8 am     Patient is aware of appointments scheduled for [facility] infusion center. All questions answered patient verbalized understanding.

## 2025-01-29 NOTE — PROGRESS NOTES
34 y.o.  at 39w1d, here for routine OB visit. Feeling well overall and without concerns. Good FM. Denies LOF, VB, regular/painful contractions.      Problem List Items Addressed This Visit          Obstetrics/Gynecology    Short interval between pregnancies complicating pregnancy, antepartum, second trimester    39 weeks gestation of pregnancy    -precautions reviewed  -s/p Tdap/flu  -aware of need to deliver in 41st week. Can schedule IOL if undelivered next visit  -GBS negative   -prepregnancy BMI 23 with goal weight gain 25-35#: TWG = 38#           Other Visit Diagnoses         Prenatal care, subsequent pregnancy, third trimester    -  Primary    Relevant Orders    POCT urine dip (Completed)

## 2025-01-29 NOTE — PROGRESS NOTES
Pt is here for routine ob visit   No concerns at this time  Urine trace protein/neg glucose   No LOF,VB  +Contractions  +FM   UTD flu/tdap  Delivery consent signed at previous visit   GBS negative

## 2025-01-29 NOTE — ASSESSMENT & PLAN NOTE
-precautions reviewed  -s/p Tdap/flu  -aware of need to deliver in 41st week. Can schedule IOL if undelivered next visit  -GBS negative   -prepregnancy BMI 23 with goal weight gain 25-35#: TWG = 38#

## 2025-01-31 ENCOUNTER — HOSPITAL ENCOUNTER (OUTPATIENT)
Dept: INFUSION CENTER | Facility: CLINIC | Age: 35
End: 2025-01-31
Payer: COMMERCIAL

## 2025-01-31 VITALS
TEMPERATURE: 97.7 F | RESPIRATION RATE: 16 BRPM | HEART RATE: 75 BPM | SYSTOLIC BLOOD PRESSURE: 101 MMHG | DIASTOLIC BLOOD PRESSURE: 62 MMHG | OXYGEN SATURATION: 99 %

## 2025-01-31 DIAGNOSIS — Z3A.39 39 WEEKS GESTATION OF PREGNANCY: Primary | ICD-10-CM

## 2025-01-31 PROCEDURE — 96365 THER/PROPH/DIAG IV INF INIT: CPT

## 2025-01-31 RX ORDER — SODIUM CHLORIDE 9 MG/ML
20 INJECTION, SOLUTION INTRAVENOUS ONCE
Status: CANCELLED | OUTPATIENT
Start: 2025-02-03

## 2025-01-31 RX ORDER — SODIUM CHLORIDE 9 MG/ML
20 INJECTION, SOLUTION INTRAVENOUS ONCE
Status: COMPLETED | OUTPATIENT
Start: 2025-01-31 | End: 2025-01-31

## 2025-01-31 RX ADMIN — IRON SUCROSE 200 MG: 20 INJECTION, SOLUTION INTRAVENOUS at 09:51

## 2025-01-31 RX ADMIN — SODIUM CHLORIDE 20 ML/HR: 0.9 INJECTION, SOLUTION INTRAVENOUS at 09:51

## 2025-01-31 NOTE — PROGRESS NOTES
Patient tolerated venofer without incident. Next appointment confirmed for Monday at 0800, declined AVS.

## 2025-01-31 NOTE — PROGRESS NOTES
Patient presents to the Infusion Center for the treatment of Venofer. She offers no concerns at this time. PIV placed in her Right AC with good blood return. Patient is resting comfortably in the chair, call bell within reach.

## 2025-02-03 ENCOUNTER — HOSPITAL ENCOUNTER (OUTPATIENT)
Dept: INFUSION CENTER | Facility: CLINIC | Age: 35
Discharge: HOME | End: 2025-02-03
Payer: COMMERCIAL

## 2025-02-03 VITALS
SYSTOLIC BLOOD PRESSURE: 112 MMHG | HEART RATE: 90 BPM | RESPIRATION RATE: 18 BRPM | DIASTOLIC BLOOD PRESSURE: 70 MMHG | TEMPERATURE: 97.5 F | OXYGEN SATURATION: 99 %

## 2025-02-03 DIAGNOSIS — Z3A.39 39 WEEKS GESTATION OF PREGNANCY: Primary | ICD-10-CM

## 2025-02-03 PROCEDURE — 96365 THER/PROPH/DIAG IV INF INIT: CPT

## 2025-02-03 RX ORDER — SODIUM CHLORIDE 9 MG/ML
20 INJECTION, SOLUTION INTRAVENOUS ONCE
Status: COMPLETED | OUTPATIENT
Start: 2025-02-03 | End: 2025-02-03

## 2025-02-03 RX ORDER — SODIUM CHLORIDE 9 MG/ML
20 INJECTION, SOLUTION INTRAVENOUS ONCE
Status: CANCELLED | OUTPATIENT
Start: 2025-02-06

## 2025-02-03 RX ADMIN — IRON SUCROSE 200 MG: 20 INJECTION, SOLUTION INTRAVENOUS at 08:26

## 2025-02-03 RX ADMIN — SODIUM CHLORIDE 20 ML/HR: 9 INJECTION, SOLUTION INTRAVENOUS at 08:26

## 2025-02-03 NOTE — PROGRESS NOTES
Pt arrives to Infusion for Venofer. Offers no complaints. Pt tolerated tx without issue. PIV removed. Pt confirmed next appointment on 2/006 @1100 [facility]. AVS declined.

## 2025-02-04 ENCOUNTER — TELEPHONE (OUTPATIENT)
Dept: OBGYN CLINIC | Facility: CLINIC | Age: 35
End: 2025-02-04

## 2025-02-04 ENCOUNTER — ROUTINE PRENATAL (OUTPATIENT)
Dept: OBGYN CLINIC | Facility: CLINIC | Age: 35
End: 2025-02-04
Payer: COMMERCIAL

## 2025-02-04 VITALS — DIASTOLIC BLOOD PRESSURE: 64 MMHG | HEART RATE: 104 BPM | SYSTOLIC BLOOD PRESSURE: 120 MMHG | OXYGEN SATURATION: 98 %

## 2025-02-04 DIAGNOSIS — Z3A.40 40 WEEKS GESTATION OF PREGNANCY: ICD-10-CM

## 2025-02-04 DIAGNOSIS — Z34.83 PRENATAL CARE, SUBSEQUENT PREGNANCY, THIRD TRIMESTER: Primary | ICD-10-CM

## 2025-02-04 LAB
SL AMB  POCT GLUCOSE, UA: NORMAL
SL AMB POCT URINE PROTEIN: NORMAL

## 2025-02-04 PROCEDURE — 81002 URINALYSIS NONAUTO W/O SCOPE: CPT | Performed by: STUDENT IN AN ORGANIZED HEALTH CARE EDUCATION/TRAINING PROGRAM

## 2025-02-04 PROCEDURE — PNV: Performed by: STUDENT IN AN ORGANIZED HEALTH CARE EDUCATION/TRAINING PROGRAM

## 2025-02-04 NOTE — PROGRESS NOTES
40 weeks gestation of pregnancy  35yo  at 40.0wks presents for routine prenatal visit     Pt denies contractions, vaginal bleeding, leakage of fluid. Endorses fetal movement.    -continue PNVs   -s/p tdap   -GBS negative   -pt okay to schedule IOL at 41 weeks. IOL consent signed. Request sent.   -ALON 3/50/-3  -message sent to set up surveillance for post dates   -labor precautions   -follow up post partum

## 2025-02-04 NOTE — ASSESSMENT & PLAN NOTE
35yo  at 40.0wks presents for routine prenatal visit     Pt denies contractions, vaginal bleeding, leakage of fluid. Endorses fetal movement.    -continue PNVs   -s/p tdap   -GBS negative   -pt okay to schedule IOL at 41 weeks. IOL consent signed. Request sent.   -ALON 3/50/-3  -message sent to set up surveillance for post dates   -labor precautions   -follow up post partum

## 2025-02-06 ENCOUNTER — ULTRASOUND (OUTPATIENT)
Facility: HOSPITAL | Age: 35
End: 2025-02-06
Payer: COMMERCIAL

## 2025-02-06 ENCOUNTER — HOSPITAL ENCOUNTER (OUTPATIENT)
Facility: HOSPITAL | Age: 35
Discharge: HOME | End: 2025-02-06
Attending: OBSTETRICS & GYNECOLOGY | Admitting: OBSTETRICS & GYNECOLOGY
Payer: COMMERCIAL

## 2025-02-06 ENCOUNTER — HOSPITAL ENCOUNTER (OUTPATIENT)
Dept: INFUSION CENTER | Facility: CLINIC | Age: 35
End: 2025-02-06
Payer: COMMERCIAL

## 2025-02-06 VITALS
HEIGHT: 65 IN | BODY MASS INDEX: 29.76 KG/M2 | HEART RATE: 100 BPM | WEIGHT: 178.6 LBS | RESPIRATION RATE: 18 BRPM | TEMPERATURE: 97.9 F | SYSTOLIC BLOOD PRESSURE: 112 MMHG | DIASTOLIC BLOOD PRESSURE: 69 MMHG

## 2025-02-06 VITALS — BODY MASS INDEX: 29.76 KG/M2 | HEIGHT: 65 IN | WEIGHT: 178.6 LBS

## 2025-02-06 DIAGNOSIS — O28.8 NON-STRESS TEST NONREACTIVE: Primary | ICD-10-CM

## 2025-02-06 DIAGNOSIS — O36.8390 VARIABLE FETAL HEART RATE DECELERATIONS, ANTEPARTUM: ICD-10-CM

## 2025-02-06 DIAGNOSIS — Z3A.40 40 WEEKS GESTATION OF PREGNANCY: ICD-10-CM

## 2025-02-06 PROBLEM — O47.9 UTERINE CONTRACTIONS: Status: ACTIVE | Noted: 2025-02-06

## 2025-02-06 PROCEDURE — 76818 FETAL BIOPHYS PROFILE W/NST: CPT | Performed by: OBSTETRICS & GYNECOLOGY

## 2025-02-06 PROCEDURE — 99214 OFFICE O/P EST MOD 30 MIN: CPT | Performed by: OBSTETRICS & GYNECOLOGY

## 2025-02-06 PROCEDURE — 99213 OFFICE O/P EST LOW 20 MIN: CPT

## 2025-02-06 PROCEDURE — NC001 PR NO CHARGE: Performed by: OBSTETRICS & GYNECOLOGY

## 2025-02-06 NOTE — PROGRESS NOTES
L&D Triage Note - OB/GYN  Zhanna Torres 34 y.o. female MRN: [id number]  Unit/Bed#: LD TRIAGE  Encounter: [id number]      ASSESSMENT:    Zhanna Torres is a 34 y.o.  at 40w2d who was evaluated today in triage for variables on NST at [facility] and contractions. Extended monitoring reactive and reassuring. SVE 3/50/-3.    Disposition per night team pending recheck.     PLAN:    1) Contractions  - SVE: 3/50/-3 (unchanged from 2025)  - [place]: q5-10 minutes  - Plan for 2 hour recheck; IOL offered but pt declines at this time    2) Variables on NST  - questionable variables vs long accelerations on NST at [facility]  - extended monitoring (2h) reactive and reassuring without decelerations      SUBJECTIVE:    Zhanna Torres 34 y.o.  at 40w2d with an Estimated Date of Delivery: 25 presenting for extended monitoring after questionable variables on NST at [facility]. BPP . She endorses contractions which started while she was being monitored at [facility]. She feels them every couple of minutes but has not been timing them. Denies LOF, vaginal bleeding. Feels good fetal movement.       Her past obstetrical history is significant for  x5. This pregnancy has been complicated by short interval pregnancy.         OBJECTIVE:    Vitals:    25 1536   BP: 112/69   Pulse: 100   Resp:    Temp:        ROS:  Constitutional: Negative  Respiratory: Negative  Cardiovascular: Negative    Gastrointestinal: Negative    General Physical Exam:  General: Well appearing, no distress  Respiratory: Unlabored breathing  Cardiovascular: Regular rate.  Abdomen: Soft, gravid, nontender  Fundal Height: Appropriate for gestational age.  Extremities: Warm and well perfused.  Non tender.      Fetal monitoring:  Fetal heart rate: Baseline Rate (FHR): 140 bpm  Variability: Moderate  Accelerations: 15 x 15 or greater, With fetal movement  Decelerations: None  FHR Category: Category I  [place]: Contraction Frequency (minutes): 3-7  Contraction  Duration (seconds): 120-230      Cervical Exam  SVE: 3/50/-3        Joy Crowe MD  OBGYN, PGY-I  02/06/25  5:38 PM

## 2025-02-06 NOTE — LETTER
February 6, 2025     Samia Lamb MD  [address]  [address]  [facility] [address]    Patient: Zhanna Torres   YOB: 1990   Date of Visit: 2/6/2025       Dear Dr. Lamb:    Thank you for referring Zhanna Torres to me for evaluation. Below are my notes for this consultation.    If you have questions, please do not hesitate to call me. I look forward to following your patient along with you.         Sincerely,        Shoshana Willis MD        CC: No Recipients    Shoshana Willis MD  2/6/2025  9:28 PM  Sign when Signing Visit  Review of the NST today is difficult to interpret the baseline.  On first look it looks like a baseline of 170s with decels to the 130s but may also be a baseline of 130 with accelerations to the 170s.  JOHN was normal at 18 and a biophysical profile was quickly completed in less than 30 minutes and was normal at 8 out of 8.     Due to her gestational age of 40 weeks and 2 days recommend she have further follow-up in labor and delivery for prolonged monitoring and consideration for induction.     She reports she is reluctant to undergo induction if testing in labor and delivery is normal because her last induction resulted in her baby flipping to breech during the induction and she required a version.    Pre visit time reviewing her records 5 minutes  Face to face time 5 minutes  Post visit time on documentation of note, updating her problem list, adding orders and prescriptions 5 minutes.  Procedures that were completed today were charged separately.   The level of decision making was straight forward  Shoshana Willis MD

## 2025-02-06 NOTE — PROGRESS NOTES
Non-Stress Testing:    Non-Stress test, equipment, procedure, and expected outcomes explained. Reviewed fetal kick counts and when to call OB.Verified patient understanding of fetal kick counts with teach back method. Patient reports feeling daily fetal movements. Patient has no questions or concerns.   Pt denies LOF/VB, or regular contractions.  PT verbalizes + FM and doing daily fetal kick counts.  During nonstress test, baby very active visibly and audibly.    Reviewed non-stress test with Dr. Willis in-person

## 2025-02-07 NOTE — PROGRESS NOTES
Review of the NST today is difficult to interpret the baseline.  On first look it looks like a baseline of 170s with decels to the 130s but may also be a baseline of 130 with accelerations to the 170s.  JOHN was normal at 18 and a biophysical profile was quickly completed in less than 30 minutes and was normal at 8 out of 8.     Due to her gestational age of 40 weeks and 2 days recommend she have further follow-up in labor and delivery for prolonged monitoring and consideration for induction.     She reports she is reluctant to undergo induction if testing in labor and delivery is normal because her last induction resulted in her baby flipping to breech during the induction and she required a version.    Pre visit time reviewing her records 5 minutes  Face to face time 5 minutes  Post visit time on documentation of note, updating her problem list, adding orders and prescriptions 5 minutes.  Procedures that were completed today were charged separately.   The level of decision making was straight forward  Shoshana Willis MD

## 2025-02-08 ENCOUNTER — HOSPITAL ENCOUNTER (EMERGENCY)
Facility: HOSPITAL | Age: 35
Discharge: HOME | End: 2025-02-08
Attending: EMERGENCY MEDICINE
Payer: COMMERCIAL

## 2025-02-08 VITALS
OXYGEN SATURATION: 100 % | RESPIRATION RATE: 16 BRPM | SYSTOLIC BLOOD PRESSURE: 132 MMHG | TEMPERATURE: 97.9 F | HEART RATE: 84 BPM | DIASTOLIC BLOOD PRESSURE: 60 MMHG

## 2025-02-08 DIAGNOSIS — J06.9 URI (UPPER RESPIRATORY INFECTION): ICD-10-CM

## 2025-02-08 DIAGNOSIS — H66.92 LEFT OTITIS MEDIA: Primary | ICD-10-CM

## 2025-02-08 PROCEDURE — 99284 EMERGENCY DEPT VISIT MOD MDM: CPT | Performed by: EMERGENCY MEDICINE

## 2025-02-08 PROCEDURE — 99284 EMERGENCY DEPT VISIT MOD MDM: CPT

## 2025-02-08 RX ADMIN — AMOXICILLIN AND CLAVULANATE POTASSIUM 1 TABLET: 875; 125 TABLET, FILM COATED ORAL at 06:16

## 2025-02-08 NOTE — DISCHARGE INSTRUCTIONS
Take Augmentin orally 2 times daily for 7 days for treatment of acute otitis media.    You may continue taking acetaminophen for discomfort and also consider use of over-the-counter antihistamines and intranasal steroids to help with symptoms of congestion.

## 2025-02-08 NOTE — ED PROVIDER NOTES
Time reflects when diagnosis was documented in both MDM as applicable and the Disposition within this note       Time User Action Codes Description Comment    2/8/2025  6:07 AM Tiffany Baker Add [H66.92] Left otitis media     2/8/2025  6:07 AM Tiffany Baker Add [J06.9] URI (upper respiratory infection)           ED Disposition       ED Disposition   Discharge    Condition   Stable    Date/Time   Sat Feb 8, 2025  6:07 AM    Comment   Zhanna Torres discharge to home/self care.                   Assessment & Plan       Medical Decision Making  URI symptoms over the last 1 week, severe left otalgia today with findings consistent with AOM.  Covering with antibiotic.  Reviewed additional medications which could help alleviate some degree of symptoms including antihistamine and nasal steroid.  She has used nasal saline rinses at home.  Declines additional medication here.    Risk  Prescription drug management.             Medications   amoxicillin-clavulanate (AUGMENTIN) 875-125 mg per tablet 1 tablet (1 tablet Oral Given 2/8/25 0616)       ED Risk Strat Scores                                              History of Present Illness       Chief Complaint   Patient presents with    Earache     Pt reports left sided ear pain since 1am, reports feeling congested x couple of days, no fevers.       Past Medical History:   Diagnosis Date    Anemia     2018, and 2023 (needed iron infusions)    Anorexia     resolved    Anxiety     pregnancy related    Asthma     Headache(784.0)     Migraine     As Child/Resolved    Normal delivery     2013 daughter, 2014 daughter, 2016 daughter    PONV (postoperative nausea and vomiting)     with wisdom teeth    Varicella     As child      Past Surgical History:   Procedure Laterality Date    ARM SKIN LESION BIOPSY / EXCISION Right     MYRINGOTOMY W/ TUBES      WISDOM TOOTH EXTRACTION        Family History   Problem Relation Age of Onset    Diabetes Mother      Hypertension Mother     Uterine cancer Mother 50    Hypothyroidism Mother     Ankylosing spondylitis Mother     Asthma Mother     Cancer Mother         Uterine    Thyroid disease Mother     Cancer Father         Prostate    Prostate cancer Father 58    No Known Problems Sister     No Known Problems Sister     No Known Problems Daughter     No Known Problems Daughter     No Known Problems Daughter     No Known Problems Daughter     No Known Problems Daughter     Diabetes Maternal Grandmother     Stroke Maternal Grandmother     Cancer Maternal Grandfather         lung    Lung cancer Maternal Grandfather     Diabetes Maternal Grandfather     Heart disease Paternal Grandfather     Kidney failure Paternal Aunt     No Known Problems Half-Brother     Breast cancer Neg Hx     Colon cancer Neg Hx     Ovarian cancer Neg Hx     Cervical cancer Neg Hx       Social History     Tobacco Use    Smoking status: Never    Smokeless tobacco: Never   Vaping Use    Vaping status: Never Used   Substance Use Topics    Alcohol use: Not Currently     Comment: rare- BF    Drug use: No     Comment: denies self or   denies family use      E-Cigarette/Vaping    E-Cigarette Use Never User       E-Cigarette/Vaping Substances    Nicotine No     THC No     CBD No     Flavoring No     Other No     Unknown No       I have reviewed and agree with the history as documented.     Zhanna is a 33 yo [facility]  at 40 weeks and 4 days who presents to the emergency department for evaluation explaining that over the last week she has had URI symptoms.  She awoke early this morning with severe left ear pain feeling as though this might rupture.  No fevers, chest discomfort or dyspnea.  She has been able to tolerate p.o.  She did take acetaminophen 650 mg upon waking a few hours ago and has not had any relief in pain.  History of remote tympanostomy tubes and fairly frequent ear infections although last was approximately 12 years ago.  Daughter  had influenza a couple of weeks ago.    She has been experiencing intermittent contractions and was evaluated at labor and delivery a few days ago.  Plan is for induction at 41 weeks if she does not deliver before then.  No increased contractions, leakage of fluid or bleeding.  Still with good fetal movement.        Review of Systems   HENT:  Positive for sore throat.    All other systems reviewed and are negative.          Objective       ED Triage Vitals [02/08/25 0545]   Temperature Pulse Blood Pressure Respirations SpO2 Patient Position - Orthostatic VS   97.9 °F (36.6 °C) 84 132/60 16 100 % --      Temp Source Heart Rate Source BP Location FiO2 (%) Pain Score    Oral Monitor -- -- --      Vitals      Date and Time Temp Pulse SpO2 Resp BP Pain Score FACES Pain Rating User   02/08/25 0545 97.9 °F (36.6 °C) 84 100 % 16 132/60 -- -- TLB            Physical Exam  Vitals and nursing note reviewed.   Constitutional:       Comments: Appears uncomfortable   HENT:      Right Ear: Tympanic membrane, ear canal and external ear normal.      Left Ear: Ear canal and external ear normal.      Ears:      Comments: Left tympanic membrane bulging, dull and deeply injected.  No mastoid tenderness.  Eyes:      Extraocular Movements: Extraocular movements intact.      Conjunctiva/sclera: Conjunctivae normal.   Cardiovascular:      Rate and Rhythm: Normal rate and regular rhythm.   Pulmonary:      Effort: Pulmonary effort is normal.      Breath sounds: Normal breath sounds.   Musculoskeletal:         General: Normal range of motion.      Cervical back: Normal range of motion and neck supple.   Lymphadenopathy:      Cervical: Cervical adenopathy (Small, left greater than right) present.   Skin:     General: Skin is warm and dry.   Neurological:      Mental Status: She is alert and oriented to person, place, and time.   Psychiatric:         Mood and Affect: Mood normal.         Behavior: Behavior normal.         Results Reviewed        None            No orders to display       Procedures    ED Medication and Procedure Management   Prior to Admission Medications   Prescriptions Last Dose Informant Patient Reported? Taking?   Ferrous Sulfate (IRON PO)  Self Yes No   Sig: Take by mouth   Patient not taking: Reported on 2/6/2025   Nutritional Supplements (VITAMIN D MAINTENANCE PO)  Self Yes No   Sig: Take 1,000 Units by mouth in the morning   Prenatal Vit-Fe Fumarate-FA (PRENATAL VITAMINS PO)  Self Yes No   Sig: Take by mouth   albuterol (ProAir HFA) 90 mcg/act inhaler  Self No No   Sig: Inhale 2 puffs every 6 (six) hours as needed for wheezing      Facility-Administered Medications: None     Discharge Medication List as of 2/8/2025  6:10 AM        START taking these medications    Details   amoxicillin-clavulanate (AUGMENTIN) 875-125 mg per tablet Take 1 tablet by mouth every 12 (twelve) hours for 7 days, Starting Sat 2/8/2025, Until Sat 2/15/2025, Normal           CONTINUE these medications which have NOT CHANGED    Details   albuterol (ProAir HFA) 90 mcg/act inhaler Inhale 2 puffs every 6 (six) hours as needed for wheezing, Starting Mon 10/7/2024, Normal      Ferrous Sulfate (IRON PO) Take by mouth, Historical Med      Nutritional Supplements (VITAMIN D MAINTENANCE PO) Take 1,000 Units by mouth in the morning, Historical Med      Prenatal Vit-Fe Fumarate-FA (PRENATAL VITAMINS PO) Take by mouth, Historical Med           No discharge procedures on file.  ED SEPSIS DOCUMENTATION   Time reflects when diagnosis was documented in both MDM as applicable and the Disposition within this note       Time User Action Codes Description Comment    2/8/2025  6:07 AM Tiffany Baker Add [H66.92] Left otitis media     2/8/2025  6:07 AM Tiffany Baker Add [J06.9] URI (upper respiratory infection)                  Tiffany Baker MD  02/08/25 0732

## 2025-02-10 ENCOUNTER — HOSPITAL ENCOUNTER (OUTPATIENT)
Dept: INFUSION CENTER | Facility: CLINIC | Age: 35
Discharge: HOME | End: 2025-02-10
Payer: COMMERCIAL

## 2025-02-10 VITALS
HEART RATE: 98 BPM | OXYGEN SATURATION: 98 % | TEMPERATURE: 97.4 F | SYSTOLIC BLOOD PRESSURE: 118 MMHG | DIASTOLIC BLOOD PRESSURE: 56 MMHG | RESPIRATION RATE: 16 BRPM

## 2025-02-10 DIAGNOSIS — Z3A.40 40 WEEKS GESTATION OF PREGNANCY: Primary | ICD-10-CM

## 2025-02-10 RX ORDER — SODIUM CHLORIDE 9 MG/ML
20 INJECTION, SOLUTION INTRAVENOUS ONCE
Status: COMPLETED | OUTPATIENT
Start: 2025-02-10 | End: 2025-02-10

## 2025-02-10 RX ORDER — SODIUM CHLORIDE 9 MG/ML
20 INJECTION, SOLUTION INTRAVENOUS ONCE
OUTPATIENT
Start: 2025-02-14

## 2025-02-10 RX ADMIN — SODIUM CHLORIDE 20 ML/HR: 0.9 INJECTION, SOLUTION INTRAVENOUS at 13:33

## 2025-02-10 RX ADMIN — IRON SUCROSE 200 MG: 20 INJECTION, SOLUTION INTRAVENOUS at 13:33

## 2025-02-10 NOTE — PROGRESS NOTES
Patient tolerated treatment without incident. Patient being induced tomorrow 2/11- no further venofer treatments at this time. AVS declined.

## 2025-02-11 ENCOUNTER — HOSPITAL ENCOUNTER (INPATIENT)
Facility: HOSPITAL | Age: 35
LOS: 2 days | Discharge: HOME | End: 2025-02-13
Attending: OBSTETRICS & GYNECOLOGY | Admitting: OBSTETRICS & GYNECOLOGY
Payer: COMMERCIAL

## 2025-02-11 ENCOUNTER — HOSPITAL ENCOUNTER (OUTPATIENT)
Dept: LABOR AND DELIVERY | Facility: HOSPITAL | Age: 35
Discharge: HOME | End: 2025-02-11
Payer: COMMERCIAL

## 2025-02-11 DIAGNOSIS — O48.0 41 WEEKS GESTATION OF PREGNANCY: Primary | ICD-10-CM

## 2025-02-11 DIAGNOSIS — Z3A.41 41 WEEKS GESTATION OF PREGNANCY: Primary | ICD-10-CM

## 2025-02-11 PROBLEM — H66.90 ACUTE OTITIS MEDIA: Status: ACTIVE | Noted: 2025-02-11

## 2025-02-11 PROBLEM — Z34.90 ENCOUNTER FOR INDUCTION OF LABOR: Status: ACTIVE | Noted: 2025-02-11

## 2025-02-11 LAB
ABO GROUP BLD: NORMAL
BASE EXCESS BLDCOV CALC-SCNC: -4.4 MMOL/L (ref 1–9)
BASOPHILS # BLD AUTO: 0.03 THOUSANDS/ÂΜL (ref 0–0.1)
BASOPHILS NFR BLD AUTO: 0 % (ref 0–1)
BLD GP AB SCN SERPL QL: NEGATIVE
EOSINOPHIL # BLD AUTO: 0.14 THOUSAND/ÂΜL (ref 0–0.61)
EOSINOPHIL NFR BLD AUTO: 2 % (ref 0–6)
ERYTHROCYTE [DISTWIDTH] IN BLOOD BY AUTOMATED COUNT: 15.7 % (ref 11.6–15.1)
HCO3 BLDCOV-SCNC: 19.8 MMOL/L (ref 12.2–28.6)
HCT VFR BLD AUTO: 36.8 % (ref 34.8–46.1)
HGB BLD-MCNC: 12.2 G/DL (ref 11.5–15.4)
HOLD SPECIMEN: NORMAL
IMM GRANULOCYTES # BLD AUTO: 0.1 THOUSAND/UL (ref 0–0.2)
IMM GRANULOCYTES NFR BLD AUTO: 2 % (ref 0–2)
LYMPHOCYTES # BLD AUTO: 1.94 THOUSANDS/ÂΜL (ref 0.6–4.47)
LYMPHOCYTES NFR BLD AUTO: 29 % (ref 14–44)
MCH RBC QN AUTO: 28.9 PG (ref 26.8–34.3)
MCHC RBC AUTO-ENTMCNC: 33.2 G/DL (ref 31.4–37.4)
MCV RBC AUTO: 87 FL (ref 82–98)
MONOCYTES # BLD AUTO: 0.48 THOUSAND/ÂΜL (ref 0.17–1.22)
MONOCYTES NFR BLD AUTO: 7 % (ref 4–12)
NEUTROPHILS # BLD AUTO: 4.1 THOUSANDS/ÂΜL (ref 1.85–7.62)
NEUTS SEG NFR BLD AUTO: 60 % (ref 43–75)
NRBC BLD AUTO-RTO: 0 /100 WBCS
OXYHGB MFR BLDCOV: 74 %
PCO2 BLDCOV: 33.9 MM HG (ref 27–43)
PH BLDCOV: 7.38 [PH] (ref 7.19–7.49)
PLATELET # BLD AUTO: 249 THOUSANDS/UL (ref 149–390)
PMV BLD AUTO: 9.8 FL (ref 8.9–12.7)
PO2 BLDCOV: 33.2 MM HG (ref 15–45)
RBC # BLD AUTO: 4.22 MILLION/UL (ref 3.81–5.12)
RH BLD: POSITIVE
SAO2 % BLDCOV: 14.3 ML/DL
SPECIMEN EXPIRATION DATE: NORMAL
WBC # BLD AUTO: 6.79 THOUSAND/UL (ref 4.31–10.16)

## 2025-02-11 PROCEDURE — 86901 BLOOD TYPING SEROLOGIC RH(D): CPT | Performed by: OBSTETRICS & GYNECOLOGY

## 2025-02-11 PROCEDURE — NC001 PR NO CHARGE: Performed by: OBSTETRICS & GYNECOLOGY

## 2025-02-11 PROCEDURE — 86850 RBC ANTIBODY SCREEN: CPT | Performed by: OBSTETRICS & GYNECOLOGY

## 2025-02-11 PROCEDURE — 82805 BLOOD GASES W/O2 SATURATION: CPT | Performed by: STUDENT IN AN ORGANIZED HEALTH CARE EDUCATION/TRAINING PROGRAM

## 2025-02-11 PROCEDURE — 59400 OBSTETRICAL CARE: CPT | Performed by: STUDENT IN AN ORGANIZED HEALTH CARE EDUCATION/TRAINING PROGRAM

## 2025-02-11 PROCEDURE — 86780 TREPONEMA PALLIDUM: CPT | Performed by: OBSTETRICS & GYNECOLOGY

## 2025-02-11 PROCEDURE — 85025 COMPLETE CBC W/AUTO DIFF WBC: CPT | Performed by: OBSTETRICS & GYNECOLOGY

## 2025-02-11 PROCEDURE — 86900 BLOOD TYPING SEROLOGIC ABO: CPT | Performed by: OBSTETRICS & GYNECOLOGY

## 2025-02-11 RX ORDER — OXYTOCIN/RINGER'S LACTATE 30/500 ML
1-30 PLASTIC BAG, INJECTION (ML) INTRAVENOUS
Status: DISCONTINUED | OUTPATIENT
Start: 2025-02-11 | End: 2025-02-13 | Stop reason: HOSPADM

## 2025-02-11 RX ORDER — BUPIVACAINE HYDROCHLORIDE 2.5 MG/ML
30 INJECTION, SOLUTION EPIDURAL; INFILTRATION; INTRACAUDAL ONCE AS NEEDED
Status: DISCONTINUED | OUTPATIENT
Start: 2025-02-11 | End: 2025-02-11

## 2025-02-11 RX ORDER — ACETAMINOPHEN 325 MG/1
650 TABLET ORAL EVERY 4 HOURS PRN
Status: DISCONTINUED | OUTPATIENT
Start: 2025-02-11 | End: 2025-02-13 | Stop reason: HOSPADM

## 2025-02-11 RX ORDER — SODIUM CHLORIDE, SODIUM LACTATE, POTASSIUM CHLORIDE, CALCIUM CHLORIDE 600; 310; 30; 20 MG/100ML; MG/100ML; MG/100ML; MG/100ML
125 INJECTION, SOLUTION INTRAVENOUS CONTINUOUS
Status: DISCONTINUED | OUTPATIENT
Start: 2025-02-11 | End: 2025-02-13 | Stop reason: HOSPADM

## 2025-02-11 RX ORDER — IBUPROFEN 600 MG/1
600 TABLET, FILM COATED ORAL EVERY 6 HOURS
Status: DISCONTINUED | OUTPATIENT
Start: 2025-02-11 | End: 2025-02-13 | Stop reason: HOSPADM

## 2025-02-11 RX ORDER — CALCIUM CARBONATE 500 MG/1
1000 TABLET, CHEWABLE ORAL DAILY PRN
Status: DISCONTINUED | OUTPATIENT
Start: 2025-02-11 | End: 2025-02-13 | Stop reason: HOSPADM

## 2025-02-11 RX ORDER — ALBUTEROL SULFATE 90 UG/1
2 INHALANT RESPIRATORY (INHALATION) EVERY 6 HOURS PRN
Status: DISCONTINUED | OUTPATIENT
Start: 2025-02-11 | End: 2025-02-11

## 2025-02-11 RX ORDER — METHYLERGONOVINE MALEATE 0.2 MG/ML
0.2 INJECTION INTRAVENOUS ONCE
Status: DISCONTINUED | OUTPATIENT
Start: 2025-02-11 | End: 2025-02-13 | Stop reason: HOSPADM

## 2025-02-11 RX ORDER — OXYTOCIN/RINGER'S LACTATE 30/500 ML
250 PLASTIC BAG, INJECTION (ML) INTRAVENOUS CONTINUOUS
Status: ACTIVE | OUTPATIENT
Start: 2025-02-11 | End: 2025-02-11

## 2025-02-11 RX ORDER — ONDANSETRON 2 MG/ML
4 INJECTION INTRAMUSCULAR; INTRAVENOUS EVERY 8 HOURS PRN
Status: DISCONTINUED | OUTPATIENT
Start: 2025-02-11 | End: 2025-02-13 | Stop reason: HOSPADM

## 2025-02-11 RX ORDER — BENZOCAINE/MENTHOL 6 MG-10 MG
1 LOZENGE MUCOUS MEMBRANE DAILY PRN
Status: DISCONTINUED | OUTPATIENT
Start: 2025-02-11 | End: 2025-02-13 | Stop reason: HOSPADM

## 2025-02-11 RX ORDER — SIMETHICONE 80 MG
80 TABLET,CHEWABLE ORAL 4 TIMES DAILY PRN
Status: DISCONTINUED | OUTPATIENT
Start: 2025-02-11 | End: 2025-02-13 | Stop reason: HOSPADM

## 2025-02-11 RX ORDER — DOCUSATE SODIUM 100 MG/1
100 CAPSULE, LIQUID FILLED ORAL 2 TIMES DAILY
Status: DISCONTINUED | OUTPATIENT
Start: 2025-02-11 | End: 2025-02-13 | Stop reason: HOSPADM

## 2025-02-11 RX ORDER — DIPHENHYDRAMINE HCL 25 MG
25 TABLET ORAL EVERY 6 HOURS PRN
Status: DISCONTINUED | OUTPATIENT
Start: 2025-02-11 | End: 2025-02-13 | Stop reason: HOSPADM

## 2025-02-11 RX ORDER — ONDANSETRON 2 MG/ML
4 INJECTION INTRAMUSCULAR; INTRAVENOUS EVERY 6 HOURS PRN
Status: DISCONTINUED | OUTPATIENT
Start: 2025-02-11 | End: 2025-02-11

## 2025-02-11 RX ADMIN — SODIUM CHLORIDE, SODIUM LACTATE, POTASSIUM CHLORIDE, AND CALCIUM CHLORIDE 125 ML/HR: .6; .31; .03; .02 INJECTION, SOLUTION INTRAVENOUS at 19:00

## 2025-02-11 RX ADMIN — SODIUM CHLORIDE, SODIUM LACTATE, POTASSIUM CHLORIDE, AND CALCIUM CHLORIDE 125 ML/HR: .6; .31; .03; .02 INJECTION, SOLUTION INTRAVENOUS at 13:19

## 2025-02-11 RX ADMIN — Medication 2 MILLI-UNITS/MIN: at 13:21

## 2025-02-11 NOTE — PLAN OF CARE
Problem: PAIN - ADULT  Goal: Verbalizes/displays adequate comfort level or baseline comfort level  Description: Interventions:  - Encourage patient to monitor pain and request assistance  - Assess pain using appropriate pain scale  - Administer analgesics based on type and severity of pain and evaluate response  - Implement non-pharmacological measures as appropriate and evaluate response  - Consider cultural and social influences on pain and pain management  - Notify physician/advanced practitioner if interventions unsuccessful or patient reports new pain  Outcome: Progressing     Problem: INFECTION - ADULT  Goal: Absence or prevention of progression during hospitalization  Description: INTERVENTIONS:  - Assess and monitor for signs and symptoms of infection  - Monitor lab/diagnostic results  - Monitor all insertion sites, i.e. indwelling lines, tubes, and drains  - Monitor endotracheal if appropriate and nasal secretions for changes in amount and color  - [place] appropriate cooling/warming therapies per order  - Administer medications as ordered  - Instruct and encourage patient and family to use good hand hygiene technique  - Identify and instruct in appropriate isolation precautions for identified infection/condition  Outcome: Progressing  Goal: Absence of fever/infection during neutropenic period  Description: INTERVENTIONS:  - Monitor WBC    Outcome: Progressing     Problem: SAFETY ADULT  Goal: Patient will remain free of falls  Description: INTERVENTIONS:  - Educate patient/family on patient safety including physical limitations  - Instruct patient to call for assistance with activity   - Consult OT/PT to assist with strengthening/mobility   - Keep Call bell within reach  - Keep bed low and locked with side rails adjusted as appropriate  - Keep care items and personal belongings within reach  - Initiate and maintain comfort rounds  - Make Fall Risk Sign visible to staff  - Offer Toileting every  Hours,  in advance of need  - Initiate/Maintain alarm  - Obtain necessary fall risk management equipment:   - Apply yellow socks and bracelet for high fall risk patients  - Consider moving patient to room near nurses station  Outcome: Progressing  Goal: Maintain or return to baseline ADL function  Description: INTERVENTIONS:  -  Assess patient's ability to carry out ADLs; assess patient's baseline for ADL function and identify physical deficits which impact ability to perform ADLs (bathing, care of mouth/teeth, toileting, grooming, dressing, etc.)  - Assess/evaluate cause of self-care deficits   - Assess range of motion  - Assess patient's mobility; develop plan if impaired  - Assess patient's need for assistive devices and provide as appropriate  - Encourage maximum independence but intervene and supervise when necessary  - Involve family in performance of ADLs  - Assess for home care needs following discharge   - Consider OT consult to assist with ADL evaluation and planning for discharge  - Provide patient education as appropriate  Outcome: Progressing  Goal: Maintains/Returns to pre admission functional level  Description: INTERVENTIONS:  - Perform AM-PAC 6 Click Basic Mobility/ Daily Activity assessment daily.  - Set and communicate daily mobility goal to care team and patient/family/caregiver.   - Collaborate with rehabilitation services on mobility goals if consulted  - Perform Range of Motion  times a day.  - Reposition patient every hours.  - Dangle patient  times a day  - Stand patient  times a day  - Ambulate patient  times a day  - Out of bed to chair  times a day   - Out of bed for meals  times a day  - Out of bed for toileting  - Record patient progress and toleration of activity level   Outcome: Progressing     Problem: Knowledge Deficit  Goal: Patient/family/caregiver demonstrates understanding of disease process, treatment plan, medications, and discharge instructions  Description: Complete learning  assessment and assess knowledge base.  Interventions:  - Provide teaching at level of understanding  - Provide teaching via preferred learning methods  Outcome: Progressing  Goal: Verbalizes understanding of labor plan  Description: Assess patient/family/caregiver's baseline knowledge level and ability to understand information.  Provide education via patient/family/caregiver's preferred learning method at appropriate level of understanding.     1. Provide teaching at level of understanding.  2. Provide teaching via preferred learning method(s).  Outcome: Progressing     Problem: DISCHARGE PLANNING  Goal: Discharge to home or other facility with appropriate resources  Description: INTERVENTIONS:  - Identify barriers to discharge w/patient and caregiver  - Arrange for needed discharge resources and transportation as appropriate  - Identify discharge learning needs (meds, wound care, etc.)  - Arrange for interpretive services to assist at discharge as needed  - Refer to Case Management Department for coordinating discharge planning if the patient needs post-hospital services based on physician/advanced practitioner order or complex needs related to functional status, cognitive ability, or social support system  Outcome: Progressing     Problem: Labor & Delivery  Goal: Manages discomfort  Description: Assess and monitor for signs and symptoms of discomfort.  Assess patient's pain level regularly and per hospital policy.  Administer medications as ordered. Support use of nonpharmacological methods to help control pain such as distraction, imagery, relaxation, and application of heat and cold.  Collaborate with interdisciplinary team and patient to determine appropriate pain management plan.    1. Include patient in decisions related to comfort.  2. Offer non-pharmacological pain management interventions.  3. Report ineffective pain management to physician.  Outcome: Progressing  Goal: Patient vital signs are  stable  Description: 1. Assess vital signs - vaginal delivery.  Outcome: Progressing     Problem: BIRTH - VAGINAL/ SECTION  Goal: Fetal and maternal status remain reassuring during the birth process  Description: INTERVENTIONS:  - Monitor vital signs  - Monitor fetal heart rate  - Monitor uterine activity  - Monitor labor progression (vaginal delivery)  - DVT prophylaxis  - Antibiotic prophylaxis  Outcome: Progressing  Goal: Emotionally satisfying birthing experience for mother/fetus  Description: Interventions:  - Assess, plan, implement and evaluate the nursing care given to the patient in labor  - Advocate the philosophy that each childbirth experience is a unique experience and support the family's chosen level of involvement and control during the labor process   - Actively participate in both the patient's and family's teaching of the birth process  - Consider cultural, Christian and age-specific factors and plan care for the patient in labor  Outcome: Progressing

## 2025-02-11 NOTE — OB LABOR/OXYTOCIN SAFETY PROGRESS
Oxytocin Safety Progress Check Note - Zhanna Torres 34 y.o. female MRN: [id number]    Unit/Bed#: -01 Encounter: [id number]    Dose (vega-units/min) Oxytocin: 12 vega-units/min  Contraction Frequency (minutes): 4-5  Contraction Intensity: Mild  Uterine Activity Characteristics: Irregular  Cervical Dilation: 4        Cervical Effacement: 90  Fetal Station: -1  Baseline Rate (FHR): 125 bpm  Fetal Heart Rate (FHT): 130 BPM  FHR Category: 1           Vital Signs:   Vitals:    02/11/25 1824   BP: 106/59   Pulse: 76   Resp: 16   Temp: 99.3 °F (37.4 °C)       Notes/comments:   FHT Cat 1, AROM clear. CTM     Nanci Choi MD 2/11/2025 6:40 PM

## 2025-02-11 NOTE — H&P
H & P- Obstetrics   Zhanna Torres 34 y.o. female MRN: [id number]  Unit/Bed#: -01 Encounter: [id number]    Assessment: 34 y.o.  at 41w0d admitted for induction of labor.  SVE: 350/-3  FHT: Cat I; baseline 135 bpm/moderate variability/15x15 accelerations/no decelerations  Talpa: occasional contractions  Clinical EFW: 69%ile @32w2d; 8 lbs by Leopold's  Presentation: cephalic, confirmed by US on 2025      Plan:   Acute otitis media  Assessment & Plan  Left otitis media diagnosed on , continue course of Augmentin q12h until 2/15      Encounter for induction of labor  Assessment & Plan  Admit to L&D  CBC, RPR, Type & Screen  Clear liquid diet  GBS status: negative   Postpartum contraception: partner vasectomy  Analgesia at maternal request  Start with pitocin      Asthma  Assessment & Plan  Home albuterol prn ordered      Discussed case and plan w/ Dr. Mclain    Chief Concern: induction of labor    HPI: Zhanna Torres is a 34 y.o.  with an NIKOS of 2025, by Last Menstrual Period at 41w0d who is being admitted for elective induction of labor. History is significant for asthma. Pregnancy is complicated by short interval pregnancy. She has occasional uterine contractions, has no LOF, and reports no VB. She states she has felt good FM.    Patient Active Problem List   Diagnosis    Asthma    Vitamin D deficiency    Abdominal bruit    Hyperlipidemia    Mass of right axilla    Adenitis    Neck mass    40 weeks gestation of pregnancy    Short interval between pregnancies complicating pregnancy, antepartum, second trimester    Tachycardia    Uterine contractions    Encounter for induction of labor    Acute otitis media       OB Hx:  OB History    Para Term  AB Living   6 5 5 0 0 5   SAB IAB Ectopic Multiple Live Births   0 0 0 0 5      # Outcome Date GA Lbr Dawit/2nd Weight Sex Type Anes PTL Lv   6 Current            5 Term 23 39w1d / 00:07 3790 g (8 lb 5.7 oz) F Vag-Spont None N  FLORIAN   4 Term 11/07/18 40w2d / 00:05 3941 g (8 lb 11 oz) F Vag-Spont None N FLORIAN      Birth Comments: ECV mid labor-      Complications: Unstable lie of fetus   3 Term 06/22/16 40w6d / 00:14 3890 g (8 lb 9.2 oz) F Vag-Spont None N FLORIAN   2 Term 08/27/14 38w5d  3600 g (7 lb 15 oz) F Vag-Spont None N FLORIAN   1 Term 08/12/13 39w0d  3685 g (8 lb 2 oz) F Vag-Spont None N FLORIAN       Past Medical Hx:  Past Medical History:   Diagnosis Date    Anemia     2018, and 2023 (needed iron infusions)    Anorexia     resolved    Anxiety     pregnancy related    Asthma     Headache(784.0)     Migraine     As Child/Resolved    Normal delivery     2013 daughter, 2014 daughter, 2016 daughter    PONV (postoperative nausea and vomiting)     with wisdom teeth    Varicella     As child       Past Surgical hx:  Past Surgical History:   Procedure Laterality Date    ARM SKIN LESION BIOPSY / EXCISION Right     MYRINGOTOMY W/ TUBES      WISDOM TOOTH EXTRACTION         Social History     Socioeconomic History    Marital status: /Civil Union     Spouse name: Not on file    Number of children: Not on file    Years of education: Not on file    Highest education level: Not on file   Occupational History    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   Tobacco Use    Smoking status: Never    Smokeless tobacco: Never   Vaping Use    Vaping status: Never Used   Substance and Sexual Activity    Alcohol use: Not Currently     Comment: rare- BF    Drug use: No     Comment: denies self or   denies family use    Sexual activity: Yes     Partners: Male     Birth control/protection: None   Other Topics Concern    Not on file   Social History Narrative    Not on file     Social Drivers of Health     Financial Resource Strain: Not on file   Food Insecurity: No Food Insecurity (2/11/2025)    Nursing - Inadequate Food Risk Classification     Worried About Running Out of Food in the Last Year: Never true     Ran Out of  Food in the Last Year: Never true     Ran Out of Food in the Last Year: Never true   Transportation Needs: No Transportation Needs (2025)    Nursing - Transportation Risk Classification     Lack of Transportation: Not on file     Lack of Transportation: No   Physical Activity: Not on file   Stress: Not on file   Social Connections: Not on file   Intimate Partner Violence: Unknown (2025)    Nursing IPS     Feels Physically and Emotionally Safe: Not on file     Physically Hurt by Someone: Not on file     Humiliated or Emotionally Abused by Someone: Not on file     Physically Hurt by Someone: No     Hurt or Threatened by Someone: No   Housing Stability: Unknown (2025)    Nursing: Inadequate Housing Risk Classification     Has Housing: Not on file     Worried About Losing Housing: Not on file     Unable to Get Utilities: Not on file     Unable to Pay for Housing in the Last Year: No     Has Housin       No Known Allergies      Medications Prior to Admission:     amoxicillin-clavulanate (AUGMENTIN) 875-125 mg per tablet    Prenatal Vit-Fe Fumarate-FA (PRENATAL VITAMINS PO)    albuterol (ProAir HFA) 90 mcg/act inhaler    Ferrous Sulfate (IRON PO)    Nutritional Supplements (VITAMIN D MAINTENANCE PO)    Objective:  Temp:  [97.9 °F (36.6 °C)-98.1 °F (36.7 °C)] 97.9 °F (36.6 °C)  HR:  [] 92  BP: (106)/(56-59) 106/59  Resp:  [16-18] 16  Body mass index is 29.72 kg/m².     Physical Exam:  Physical Exam  Vitals reviewed. Exam conducted with a chaperone present.   Constitutional:       General: She is not in acute distress.     Appearance: Normal appearance.   HENT:      Head: Normocephalic and atraumatic.   Eyes:      Extraocular Movements: Extraocular movements intact.   Cardiovascular:      Rate and Rhythm: Tachycardia present.   Pulmonary:      Effort: Pulmonary effort is normal. No respiratory distress.   Abdominal:      Comments: gravid   Skin:     General: Skin is warm and dry.   Neurological:       Mental Status: She is alert and oriented to person, place, and time.   Psychiatric:         Mood and Affect: Mood normal.         Behavior: Behavior normal.          FHT:  Baseline Rate (FHR): 145 bpm  Variability: Moderate  Accelerations: 15 x 15 or greater, At variable times  Decelerations: None  FHR Category: Category I    TOCO:   Contraction Frequency (minutes): 5-6  Contraction Duration (seconds): 40-90  Contraction Intensity: Mild    Lab Results   Component Value Date    WBC 6.79 02/11/2025    HGB 12.2 02/11/2025    HCT 36.8 02/11/2025     02/11/2025     Lab Results   Component Value Date     03/17/2015    K 4.1 01/25/2025     01/25/2025    CO2 25 01/25/2025    BUN 8 01/25/2025    CREATININE 0.37 (L) 01/25/2025    GLUCOSE 87 03/17/2015    AST 13 01/25/2025    ALT 8 01/25/2025       Prenatal Labs:   Blood type:  A positive  Antibody: negative  GBS: negative  HIV: nonreactive  Rubella: immune  Syphilis IgM/IgG: nonreactive  HBsAg: nonreactive  HCAb: nonreactive  Chlamydia: negative  Gonorrhea: negative  Diabetes 1 hour screen: 102  3 hour glucose: n/a  Platelets: 249k  Hgb: 12.2 g/dL  Expected length of stay: >2 Midnights  Admission status: INPATIENT     Joy Crowe MD  OBGYN, PGY-I  02/11/25  1:08 PM

## 2025-02-11 NOTE — OB LABOR/OXYTOCIN SAFETY PROGRESS
Oxytocin Safety Progress Check Note - Zhanna Torres 34 y.o. female MRN: [id number]    Unit/Bed#: -01 Encounter: [id number]    Dose (vega-units/min) Oxytocin: 4 vega-units/min  Contraction Frequency (minutes): 3-4  Contraction Intensity: Mild  Uterine Activity Characteristics: Irregular  Cervical Dilation: 4        Cervical Effacement: 70  Fetal Station: -3  Baseline Rate (FHR): 150 bpm  Fetal Heart Rate (FHT): 160 BPM  FHR Category: 1               Vital Signs:   Vitals:    02/11/25 1522   BP: 113/60   Pulse: 77   Resp:    Temp:        Notes/comments:    4/70/-3 on exam with irregular contractions, pitocin at 4mU/ML. Offered amniotomy at this point; however, would advise her to wait in bed for at least 30 minutes or so after amniotomy Patient prefers waiting for regular contraction pattern prior to amniotomy. Will plan for next exam.    Zhanna Mclain MD 2/11/2025 3:39 PM

## 2025-02-11 NOTE — ASSESSMENT & PLAN NOTE
Continue routine post partum care  Encourage ambulation  Encourage breastfeeding  Contraception: partner vasectomy  Anticipate discharge PPD 2

## 2025-02-12 LAB — TREPONEMA PALLIDUM IGG+IGM AB [PRESENCE] IN SERUM OR PLASMA BY IMMUNOASSAY: NEGATIVE

## 2025-02-12 PROCEDURE — 99024 POSTOP FOLLOW-UP VISIT: CPT | Performed by: STUDENT IN AN ORGANIZED HEALTH CARE EDUCATION/TRAINING PROGRAM

## 2025-02-12 RX ADMIN — AMOXICILLIN AND CLAVULANATE POTASSIUM 1 TABLET: 875; 125 TABLET, FILM COATED ORAL at 00:31

## 2025-02-12 RX ADMIN — AMOXICILLIN AND CLAVULANATE POTASSIUM 1 TABLET: 875; 125 TABLET, FILM COATED ORAL at 12:28

## 2025-02-12 RX ADMIN — AMOXICILLIN AND CLAVULANATE POTASSIUM 1 TABLET: 875; 125 TABLET, FILM COATED ORAL at 22:13

## 2025-02-12 NOTE — PLAN OF CARE
Problem: POSTPARTUM  Goal: Experiences normal postpartum course  Description: INTERVENTIONS:  - Monitor maternal vital signs  - Assess uterine involution and lochia  Reactivated  Goal: Appropriate maternal -  bonding  Description: INTERVENTIONS:  - Identify family support  - Assess for appropriate maternal/infant bonding   -Encourage maternal/infant bonding opportunities  - Referral to  or  as needed  Reactivated  Goal: Establishment of infant feeding pattern  Description: INTERVENTIONS:  - Assess breast/bottle feeding  - Refer to lactation as needed  Reactivated  Goal: Incision(s), wounds(s) or drain site(s) healing without S/S of infection  Description: INTERVENTIONS  - Assess and document dressing, incision, wound bed, drain sites and surrounding tissue  - Provide patient and family education  Reactivated     Problem: SAFETY ADULT  Goal: Patient will remain free of falls  Description: INTERVENTIONS:  - Educate patient/family on patient safety including physical limitations  - Instruct patient to call for assistance with activity   - Consult OT/PT to assist with strengthening/mobility   - Keep Call bell within reach  - Keep bed low and locked with side rails adjusted as appropriate  - Keep care items and personal belongings within reach  - Initiate and maintain comfort rounds  - Make Fall Risk Sign visible to staff  - Apply yellow socks and bracelet for high fall risk patients  - Consider moving patient to room near nurses station  Outcome: Progressing  Goal: Maintain or return to baseline ADL function  Description: INTERVENTIONS:  -  Assess patient's ability to carry out ADLs; assess patient's baseline for ADL function and identify physical deficits which impact ability to perform ADLs (bathing, care of mouth/teeth, toileting, grooming, dressing, etc.)  - Assess/evaluate cause of self-care deficits   - Assess range of motion  - Assess patient's mobility; develop plan if  impaired  - Assess patient's need for assistive devices and provide as appropriate  - Encourage maximum independence but intervene and supervise when necessary  - Involve family in performance of ADLs  - Assess for home care needs following discharge   - Consider OT consult to assist with ADL evaluation and planning for discharge  - Provide patient education as appropriate  Outcome: Progressing  Goal: Maintains/Returns to pre admission functional level  Description: INTERVENTIONS:  - Perform AM-PAC 6 Click Basic Mobility/ Daily Activity assessment daily.  - Set and communicate daily mobility goal to care team and patient/family/caregiver.   - Collaborate with rehabilitation services on mobility goals if consulted  - Out of bed for toileting  - Record patient progress and toleration of activity level   Outcome: Progressing     Problem: INFECTION - ADULT  Goal: Absence or prevention of progression during hospitalization  Description: INTERVENTIONS:  - Assess and monitor for signs and symptoms of infection  - Monitor lab/diagnostic results  - Monitor all insertion sites, i.e. indwelling lines, tubes, and drains  - Monitor endotracheal if appropriate and nasal secretions for changes in amount and color  - [place] appropriate cooling/warming therapies per order  - Administer medications as ordered  - Instruct and encourage patient and family to use good hand hygiene technique  - Identify and instruct in appropriate isolation precautions for identified infection/condition  Outcome: Progressing  Goal: Absence of fever/infection during neutropenic period  Description: INTERVENTIONS:  - Monitor WBC    Outcome: Progressing     Problem: PAIN - ADULT  Goal: Verbalizes/displays adequate comfort level or baseline comfort level  Description: Interventions:  - Encourage patient to monitor pain and request assistance  - Assess pain using appropriate pain scale  - Administer analgesics based on type and severity of pain and  evaluate response  - Implement non-pharmacological measures as appropriate and evaluate response  - Consider cultural and social influences on pain and pain management  - Notify physician/advanced practitioner if interventions unsuccessful or patient reports new pain  Outcome: Progressing

## 2025-02-12 NOTE — OB LABOR/OXYTOCIN SAFETY PROGRESS
Labor Progress Note - Zhanna Torres 34 y.o. female MRN: [id number]    Unit/Bed#: -01 Encounter: [id number]    Dose (vega-units/min) Oxytocin: 12 vega-units/min  Contraction Frequency (minutes): 3-3.5  Contraction Intensity: Moderate/Strong  Uterine Activity Characteristics: Regular  Cervical Dilation: 5        Cervical Effacement: 90  Fetal Station: -1  Baseline Rate (FHR): 135 bpm  Fetal Heart Rate (FHT): 136 BPM  FHR Category: I               Vital Signs:   Vitals:    02/11/25 1915   BP: 111/56   Pulse:    Resp:    Temp:        Notes/comments:   Patient uncomfortable with contractions. Progressing in labor.  Nst cat I SVE as above.    Chu Burgos MD 2/11/2025 7:33 PM

## 2025-02-12 NOTE — PROGRESS NOTES
Progress Note - OB/GYN   Zhanna Torres 34 y.o. female MRN: [id number]  Unit/Bed#: -01 Encounter: [id number]      Assessment/Plan    Zhanna Torres is a 34 y.o.  who is PPD 1 s/p  at 41w0d     Acute otitis media  Assessment & Plan  Left otitis media diagnosed on , continue course of Augmentin q12h until 2/15      Asthma  Assessment & Plan  Home albuterol prn ordered    *  (spontaneous vaginal delivery)  Assessment & Plan  Continue routine post partum care  Encourage ambulation  Encourage breastfeeding  Contraception: Undecided  Anticipate discharge PPD 1 vs 2              Disposition: Anticipate discharge home postpartum Day #1-2  Barriers to discharge: ongoing couplet care       Subjective/Objective     Subjective: Postpartum state    Pain: no  Tolerating PO: yes  Voiding: yes  Flatus: yes  Ambulating: yes  Breastfeeding: Breastfeeding  Chest pain: no  Shortness of breath: no  Leg pain: no  Lochia: wnl    Objective:     Vitals:  Vitals:    25 2230 25 2245 25 0038 25 0432   BP: 116/59 112/56 120/77 105/69   BP Location:   Left arm Left arm   Pulse: 61 72 88 64   Resp:   18 16   Temp: 97.7 °F (36.5 °C)  98 °F (36.7 °C) 98.1 °F (36.7 °C)   TempSrc:   Oral Oral   SpO2:   98% 98%   Weight:       Height:           Physical Exam:   GEN: appears well, alert and oriented x 3, pleasant and cooperative   CV: Regular rate  RESP: non labored breathing  ABDOMEN: soft, no tenderness, no distention, Uterine fundus firm and non-tender, below the umbilicus  EXTREMITIES: non-tender  NEURO Alert and oriented to person, place, and time.       Lab Results   Component Value Date    WBC 6.79 2025    HGB 12.2 2025    HCT 36.8 2025    MCV 87 2025     2025         Joy Crowe MD  Obstetrics & Gynecology  25

## 2025-02-12 NOTE — PLAN OF CARE
Problem: PAIN - ADULT  Goal: Verbalizes/displays adequate comfort level or baseline comfort level  Description: Interventions:  - Encourage patient to monitor pain and request assistance  - Assess pain using appropriate pain scale  - Administer analgesics based on type and severity of pain and evaluate response  - Implement non-pharmacological measures as appropriate and evaluate response  - Consider cultural and social influences on pain and pain management  - Notify physician/advanced practitioner if interventions unsuccessful or patient reports new pain  Outcome: Progressing     Problem: INFECTION - ADULT  Goal: Absence or prevention of progression during hospitalization  Description: INTERVENTIONS:  - Assess and monitor for signs and symptoms of infection  - Monitor lab/diagnostic results  - Monitor all insertion sites, i.e. indwelling lines, tubes, and drains  - Monitor endotracheal if appropriate and nasal secretions for changes in amount and color  - [place] appropriate cooling/warming therapies per order  - Administer medications as ordered  - Instruct and encourage patient and family to use good hand hygiene technique  - Identify and instruct in appropriate isolation precautions for identified infection/condition  Outcome: Progressing  Goal: Absence of fever/infection during neutropenic period  Description: INTERVENTIONS:  - Monitor WBC    Outcome: Progressing     Problem: SAFETY ADULT  Goal: Patient will remain free of falls  Description: INTERVENTIONS:  - Educate patient/family on patient safety including physical limitations  - Instruct patient to call for assistance with activity   - Consult OT/PT to assist with strengthening/mobility   - Keep Call bell within reach  - Keep bed low and locked with side rails adjusted as appropriate  - Keep care items and personal belongings within reach  - Initiate and maintain comfort rounds  - Make Fall Risk Sign visible to staff  - Apply yellow socks and bracelet  for high fall risk patients  - Consider moving patient to room near nurses station  Outcome: Progressing  Goal: Maintain or return to baseline ADL function  Description: INTERVENTIONS:  -  Assess patient's ability to carry out ADLs; assess patient's baseline for ADL function and identify physical deficits which impact ability to perform ADLs (bathing, care of mouth/teeth, toileting, grooming, dressing, etc.)  - Assess/evaluate cause of self-care deficits   - Assess range of motion  - Assess patient's mobility; develop plan if impaired  - Assess patient's need for assistive devices and provide as appropriate  - Encourage maximum independence but intervene and supervise when necessary  - Involve family in performance of ADLs  - Assess for home care needs following discharge   - Consider OT consult to assist with ADL evaluation and planning for discharge  - Provide patient education as appropriate  Outcome: Progressing  Goal: Maintains/Returns to pre admission functional level  Description: INTERVENTIONS:  - Perform AM-PAC 6 Click Basic Mobility/ Daily Activity assessment daily.  - Set and communicate daily mobility goal to care team and patient/family/caregiver.   - Collaborate with rehabilitation services on mobility goals if consulted  - Out of bed for toileting  - Record patient progress and toleration of activity level   Outcome: Progressing     Problem: DISCHARGE PLANNING  Goal: Discharge to home or other facility with appropriate resources  Description: INTERVENTIONS:  - Identify barriers to discharge w/patient and caregiver  - Arrange for needed discharge resources and transportation as appropriate  - Identify discharge learning needs (meds, wound care, etc.)  - Arrange for interpretive services to assist at discharge as needed  - Refer to Case Management Department for coordinating discharge planning if the patient needs post-hospital services based on physician/advanced practitioner order or complex needs  related to functional status, cognitive ability, or social support system  Outcome: Progressing     Problem: POSTPARTUM  Goal: Experiences normal postpartum course  Description: INTERVENTIONS:  - Monitor maternal vital signs  - Assess uterine involution and lochia  Outcome: Progressing  Goal: Appropriate maternal -  bonding  Description: INTERVENTIONS:  - Identify family support  - Assess for appropriate maternal/infant bonding   -Encourage maternal/infant bonding opportunities  - Referral to  or  as needed  Outcome: Progressing  Goal: Establishment of infant feeding pattern  Description: INTERVENTIONS:  - Assess breast/bottle feeding  - Refer to lactation as needed  Outcome: Progressing  Goal: Incision(s), wounds(s) or drain site(s) healing without S/S of infection  Description: INTERVENTIONS  - Assess and document dressing, incision, wound bed, drain sites and surrounding tissue  - Provide patient and family education  Outcome: Progressing

## 2025-02-12 NOTE — L&D DELIVERY NOTE
OBGYN Vaginal Delivery Summary  Zhanna Torres 34 y.o. female MRN: [id number]  Unit/Bed#: -01 Encounter: [id number]    Predelivery Diagnosis:  1. Pregnancy at 41w0d  2. Otitis Media     Postdelivery Diagnosis:  1. Same as above  2. Delivery of term     Procedure: spontaneous vaginal delivery,no laceration(s)    Attending: Dr. Choi    Assistant: Dr. Chu Kennedy    Anesthesia: None    QBL: 173mL  Admission H.2 g/dL  Admission platelets: 249 thousands/uL    Complications: none apparent    Specimens: cord blood, arterial and venous cord blood gases, placenta to storage    Findings:   1. Viable male at , with APGARS of 9 and 9 at 1 and 5 minutes respectively. Weight pending at time of dictation.  2. Spontaneous delivery of intact placenta at . Central insertion three vessel umbilical cord  3. No laceration  4. Blood gases:  Umbilical Cord Venous Blood Gas:  Results from last 7 days   Lab Units 25   PH COV  7.384   PCO2 COV mm HG 33.9   HCO3 COV mmol/L 19.8   BASE EXC COV mmol/L -4.4*   O2 CT CD VB mL/dL 14.3   O2 HGB, VENOUS CORD % 74.0     Disposition:  Patient tolerated the procedure well and was recovering in labor and delivery room.    Brief history and labor course:  Zhanna Torres is a 34 y.o.  at 41wk0d. She presented to labor and delivery for induction of labor, SVE 3/50/-3. Induction started with pitocin, AROM cl was performed . She progressed to complete cervical dilation and began pushing.     Description of procedure  After pushing for 52 minutes, the patient delivered a viable male  at  on 2025, weight pending at time of dictation, apgars of 9 (1 min) and 9 (5 min). The fetal vertex delivered spontaneously. Baby restituted  to LOT. There was no nuchal cord. The anterior (right) shoulder delivered atraumatically with maternal expulsive forces and the assistance of gentle downward traction. The posterior shoulder delivered with maternal  expulsive forces and the assistance of gentle upward traction. The remainder of the fetus delivered spontaneously.     Upon delivery the infant was placed on the mother's abdomen and delayed cord clamping was performed. The umbilical cord was then doubly clamped and cut. The infant was noted to cry spontaneously and was moving all extremities appropriately. There was no evidence for injury. Awaiting nurse resuscitators evaluated the . Arterial and venous cord blood gases and cord blood were collected for analysis and were promptly sent to the lab. In the immediate post-partum, IV pitocin was administered, and the uterus was noted to contract down well with massage and pitocin. The placenta delivered spontaneously at  and was noted to be intact and had a centrally inserted 3 vessel cord. The placenta was sent to storage.    The vagina, cervix, perineum, and rectum were inspected. no laceration(s) were noted. No repair needed.    At the conclusion of the procedure, all needle, sponge, and instrument counts were noted to be correct. Patient tolerated the procedure well and was allowed to recover in labor and delivery room with family and  before being transferred to the post-partum floor.     Dr. Choi was present and participated in all key portions of the case.    Chu Burgos MD  2025  8:43 PM

## 2025-02-12 NOTE — DISCHARGE SUMMARY
Discharge Summary - OB/GYN   Name: Zhanna Torres 34 y.o. female I MRN: [id number]  Unit/Bed#: -01 I Date of Admission: 2025   Date of Service: 2025 I Hospital Day: 2    Obstetrics Discharge Summary  Zhanna Torres 34 y.o. female MRN: [id number]  Unit/Bed#: -01 Encounter: [id number]    Admission Date: 2025     Discharge Date: 2025    Admitting Attending: Dr. Mclain  Delivery Attending: Dr. Choi  Discharging Attending: Dr. Choi    Admitting Diagnoses:   1. Pregnancy at 41w0d  2. Otitis Media   3. Asthma  4. Induction of labor    Discharge Diagnoses:   1. Same as above  2. Delivery of term     Delivery  Route of Delivery: Vaginal, Spontaneous    Anesthesia: None,   QBL: 173 ml    Delivery: Vaginal, Spontaneous at 2025 8:21 PM  No Laceration: Perineal: None     Baby's Weight: 4000 g (8 lb 13.1 oz); 141.09    Apgar scores: 9  and 9  at 1 and 5 minutes, respectively      Hospital course: Zhanna Torres is now a 34 y.o.  who was initially admitted at 41w0d for IOL. /-3. Induction started with pitocin, AROM cl was performed . She progressed to complete cervical dilation and began pushing. .     Her post-delivery course was uncomplicated. Her postpartum pain was well controlled with oral analgesics. Maternal blood type is A positive so RhoGAM wasn't indicated.    On day of discharge, she was ambulating and able to reasonably perform all ADLs. She was voiding and had appropriate bowel function. Pain was well controlled. She was discharged home on postpartum day #2 without complications. Patient was instructed to follow up with her OBGYN as an outpatient and was given appropriate warnings to call provider if she develops signs of infection or uncontrolled pain.    Complications: none apparent    Condition at discharge: good     Provisions for Follow-Up Care:  Please see after visit summary for information related to follow-up care and any pertinent home  health orders.      Disposition: Home    Planned Readmission: No    Discharge Medications:   Please see AVS for a complete list of discharge medications.    Discharge instructions :   -Do not place anything (no partner, tampons or douche) in your vagina for 6 weeks  -You may walk for exercise for the first 6 weeks then gradually return to your usual activities   -You may take baths or shower per your preference   -Please examine your breasts in the mirror daily and call your doctor for redness or tenderness or increased warmth    -Please call your doctor's office if temperature > 100.4*F or 38* C, worsening pain or a foul discharge.

## 2025-02-12 NOTE — PLAN OF CARE
Problem: PAIN - ADULT  Goal: Verbalizes/displays adequate comfort level or baseline comfort level  Description: Interventions:  - Encourage patient to monitor pain and request assistance  - Assess pain using appropriate pain scale  - Administer analgesics based on type and severity of pain and evaluate response  - Implement non-pharmacological measures as appropriate and evaluate response  - Consider cultural and social influences on pain and pain management  - Notify physician/advanced practitioner if interventions unsuccessful or patient reports new pain  Outcome: Progressing     Problem: INFECTION - ADULT  Goal: Absence or prevention of progression during hospitalization  Description: INTERVENTIONS:  - Assess and monitor for signs and symptoms of infection  - Monitor lab/diagnostic results  - Monitor all insertion sites, i.e. indwelling lines, tubes, and drains  - Monitor endotracheal if appropriate and nasal secretions for changes in amount and color  - [place] appropriate cooling/warming therapies per order  - Administer medications as ordered  - Instruct and encourage patient and family to use good hand hygiene technique  - Identify and instruct in appropriate isolation precautions for identified infection/condition  Outcome: Progressing  Goal: Absence of fever/infection during neutropenic period  Description: INTERVENTIONS:  - Monitor WBC    Outcome: Progressing     Problem: SAFETY ADULT  Goal: Patient will remain free of falls  Description: INTERVENTIONS:  - Educate patient/family on patient safety including physical limitations  - Instruct patient to call for assistance with activity   - Consult OT/PT to assist with strengthening/mobility   - Keep Call bell within reach  - Keep bed low and locked with side rails adjusted as appropriate  - Keep care items and personal belongings within reach  - Initiate and maintain comfort rounds  - Make Fall Risk Sign visible to staff  - Apply yellow socks and bracelet  for high fall risk patients  - Consider moving patient to room near nurses station  Outcome: Progressing  Goal: Maintain or return to baseline ADL function  Description: INTERVENTIONS:  -  Assess patient's ability to carry out ADLs; assess patient's baseline for ADL function and identify physical deficits which impact ability to perform ADLs (bathing, care of mouth/teeth, toileting, grooming, dressing, etc.)  - Assess/evaluate cause of self-care deficits   - Assess range of motion  - Assess patient's mobility; develop plan if impaired  - Assess patient's need for assistive devices and provide as appropriate  - Encourage maximum independence but intervene and supervise when necessary  - Involve family in performance of ADLs  - Assess for home care needs following discharge   - Consider OT consult to assist with ADL evaluation and planning for discharge  - Provide patient education as appropriate  Outcome: Progressing  Goal: Maintains/Returns to pre admission functional level  Description: INTERVENTIONS:  - Perform AM-PAC 6 Click Basic Mobility/ Daily Activity assessment daily.  - Set and communicate daily mobility goal to care team and patient/family/caregiver.   - Collaborate with rehabilitation services on mobility goals if consulted  - Out of bed for toileting  - Record patient progress and toleration of activity level   Outcome: Progressing     Problem: Knowledge Deficit  Goal: Patient/family/caregiver demonstrates understanding of disease process, treatment plan, medications, and discharge instructions  Description: Complete learning assessment and assess knowledge base.  Interventions:  - Provide teaching at level of understanding  - Provide teaching via preferred learning methods  Outcome: Progressing  Goal: Verbalizes understanding of labor plan  Description: Assess patient/family/caregiver's baseline knowledge level and ability to understand information.  Provide education via patient/family/caregiver's  preferred learning method at appropriate level of understanding.     1. Provide teaching at level of understanding.  2. Provide teaching via preferred learning method(s).  Outcome: Progressing     Problem: DISCHARGE PLANNING  Goal: Discharge to home or other facility with appropriate resources  Description: INTERVENTIONS:  - Identify barriers to discharge w/patient and caregiver  - Arrange for needed discharge resources and transportation as appropriate  - Identify discharge learning needs (meds, wound care, etc.)  - Arrange for interpretive services to assist at discharge as needed  - Refer to Case Management Department for coordinating discharge planning if the patient needs post-hospital services based on physician/advanced practitioner order or complex needs related to functional status, cognitive ability, or social support system  Outcome: Progressing     Problem: Labor & Delivery  Goal: Manages discomfort  Description: Assess and monitor for signs and symptoms of discomfort.  Assess patient's pain level regularly and per hospital policy.  Administer medications as ordered. Support use of nonpharmacological methods to help control pain such as distraction, imagery, relaxation, and application of heat and cold.  Collaborate with interdisciplinary team and patient to determine appropriate pain management plan.    1. Include patient in decisions related to comfort.  2. Offer non-pharmacological pain management interventions.  3. Report ineffective pain management to physician.  Outcome: Progressing  Goal: Patient vital signs are stable  Description: 1. Assess vital signs - vaginal delivery.  Outcome: Progressing     Problem: BIRTH - VAGINAL/ SECTION  Goal: Fetal and maternal status remain reassuring during the birth process  Description: INTERVENTIONS:  - Monitor vital signs  - Monitor fetal heart rate  - Monitor uterine activity  - Monitor labor progression (vaginal delivery)  - DVT prophylaxis  -  Antibiotic prophylaxis  Outcome: Progressing  Goal: Emotionally satisfying birthing experience for mother/fetus  Description: Interventions:  - Assess, plan, implement and evaluate the nursing care given to the patient in labor  - Advocate the philosophy that each childbirth experience is a unique experience and support the family's chosen level of involvement and control during the labor process   - Actively participate in both the patient's and family's teaching of the birth process  - Consider cultural, Taoist and age-specific factors and plan care for the patient in labor  Outcome: Progressing

## 2025-02-13 VITALS
HEIGHT: 65 IN | RESPIRATION RATE: 18 BRPM | OXYGEN SATURATION: 100 % | BODY MASS INDEX: 29.76 KG/M2 | SYSTOLIC BLOOD PRESSURE: 107 MMHG | DIASTOLIC BLOOD PRESSURE: 63 MMHG | HEART RATE: 60 BPM | TEMPERATURE: 97.4 F | WEIGHT: 178.6 LBS

## 2025-02-13 PROCEDURE — NC001 PR NO CHARGE: Performed by: STUDENT IN AN ORGANIZED HEALTH CARE EDUCATION/TRAINING PROGRAM

## 2025-02-13 PROCEDURE — 4A1HXCZ MONITORING OF PRODUCTS OF CONCEPTION, CARDIAC RATE, EXTERNAL APPROACH: ICD-10-PCS | Performed by: STUDENT IN AN ORGANIZED HEALTH CARE EDUCATION/TRAINING PROGRAM

## 2025-02-13 PROCEDURE — 99024 POSTOP FOLLOW-UP VISIT: CPT | Performed by: STUDENT IN AN ORGANIZED HEALTH CARE EDUCATION/TRAINING PROGRAM

## 2025-02-13 RX ORDER — IBUPROFEN 600 MG/1
600 TABLET, FILM COATED ORAL EVERY 6 HOURS
Start: 2025-02-13

## 2025-02-13 RX ORDER — ACETAMINOPHEN 325 MG/1
650 TABLET ORAL EVERY 4 HOURS PRN
Start: 2025-02-13

## 2025-02-13 RX ORDER — BENZOCAINE/MENTHOL 6 MG-10 MG
1 LOZENGE MUCOUS MEMBRANE DAILY PRN
Start: 2025-02-13

## 2025-02-13 NOTE — PROGRESS NOTES
Progress Note - OB/GYN   Zhanna Torres 34 y.o. female MRN: [id number]  Unit/Bed#: -01 Encounter: [id number]      Assessment/Plan    Zhanna Torres is a 34 y.o.  who is PPD 2 s/p  at 41w0d     Acute otitis media  Assessment & Plan  Left otitis media diagnosed on , continue course of Augmentin q12h until 2/15      Asthma  Assessment & Plan  Home albuterol prn ordered    *  (spontaneous vaginal delivery)  Assessment & Plan  Continue routine post partum care  Encourage ambulation  Encourage breastfeeding  Contraception: partner vasectomy  Anticipate discharge PPD 2              Disposition: Anticipate discharge home postpartum Day #2  Barriers to discharge: none      Subjective/Objective     Subjective: Postpartum state    Pain: no  Tolerating PO: yes  Voiding: yes  Flatus: yes  Ambulating: yes  Breastfeeding: Breastfeeding  Chest pain: no  Shortness of breath: no  Leg pain: no  Lochia: wnl    Objective:     Vitals:  Vitals:    25 1204 25 1635 25 2357   BP: 107/53 112/66 103/62 (!) 89/50   BP Location: Right arm Left arm Left arm Left arm   Pulse: 58 58 68 61   Resp: 18 18 18 15   Temp: 98 °F (36.7 °C) 98 °F (36.7 °C) 97.9 °F (36.6 °C) 97.5 °F (36.4 °C)   TempSrc: Oral Oral Oral Oral   SpO2: 100% 100% 96% 97%   Weight:       Height:           Physical Exam:   GEN: appears well, alert and oriented x 3, pleasant and cooperative   CV: Regular rate  RESP: non labored breathing  ABDOMEN: soft, no tenderness, no distention, Uterine fundus firm and non-tender, at the umbilicus  EXTREMITIES: non-tender  NEURO Alert and oriented to person, place, and time.       Lab Results   Component Value Date    WBC 6.79 2025    HGB 12.2 2025    HCT 36.8 2025    MCV 87 2025     2025         Joy Crowe MD  Obstetrics & Gynecology  25

## 2025-02-13 NOTE — DISCHARGE INSTR - AVS FIRST PAGE
Vaginal Delivery   WHAT YOU SHOULD KNOW:   A vaginal delivery is the birth of your baby through your vagina (birth canal).        AFTER YOU LEAVE:   Medicines:  NSAIDs  help decrease swelling and pain or fever. This medicine is available with or without a doctor's order. NSAIDs can cause stomach bleeding or kidney problems in certain people. If you take blood thinner medicine, always ask your healthcare provider if NSAIDs are safe for you. Always read the medicine label and follow directions.    Take your medicine as directed.  Call your healthcare provider if you think your medicine is not helping or if you have side effects. Tell him if you are allergic to any medicine. Keep a list of the medicines, vitamins, and herbs you take. Include the amounts, and when and why you take them. Bring the list or the pill bottles to follow-up visits. Carry your medicine list with you in case of an emergency.  Follow up with your primary healthcare provider:  Most women need to return 6 weeks after a vaginal delivery. Ask about how to care for your wounds or stitches. Write down your questions so you remember to ask them during your visits.  Activity:  Rest as much as possible. Try to keep all activities short. You may be able to do some exercise soon after you have your baby. Talk with your primary healthcare provider before you start exercising. If you work outside the home, ask when you can return to your job.  Kegel exercises:  Kegel exercises may help your vaginal and rectal muscles heal faster. You can do Kegel exercises by tightening and relaxing the muscles around your vagina. Kegel exercises help make the muscles stronger.   Breast care:  When your milk comes in, your breasts may feel full and hard. Ask how to care for your breasts, even if you are not breastfeeding.   Constipation:  Do not try to push the bowel movement out if it is too hard. High-fiber foods, extra liquids, and regular exercise can help you prevent  constipation. Examples of high-fiber foods are fruit and bran. Prune juice and water are good liquids to drink. Regular exercise helps your digestive system work. You may also be told to take over-the-counter fiber and stool softener medicines. Take these items as directed.   Hemorrhoids:  Pregnancy can cause severe hemorrhoids. You may have rectal pain because of the hemorrhoids. Ask how to prevent or treat hemorrhoids.  Perineum care:  Your perineum is the area between your vagina and anus. Keep the area clean and dry to help it heal and to prevent infection. Wash the area gently with soap and water when you bathe or shower. Rinse your perineum with warm water when you use the toilet. Your primary healthcare provider may suggest you use a warm sitz bath to help decrease pain. A sitz bath is a bathtub or basin filled to hip level. Stay in the sitz bath for 20 to 30 minutes, or as directed.   Vaginal discharge:  You will have vaginal discharge, called lochia, after your delivery. The lochia is bright red the first day or two after the birth. By the fourth day, the amount decreases, and it turns red-brown. Use a sanitary pad rather than a tampon to prevent a vaginal infection. It is normal to have lochia up to 8 weeks after your baby is born.   Monthly periods:  Your period may start again within 7 to 12 weeks after your baby is born. If you are breastfeeding, it may take longer for your period to start again. You can still get pregnant again even though you do not have your monthly period. Talk with your primary healthcare provider about a birth control method that will be good for you if you do not want to get pregnant.  Mood changes:  Many new mothers have some kind of mood changes after delivery. Some of these changes occur because of lack of sleep, hormone changes, and caring for a new baby. Some mood changes can be more serious, such as postpartum depression. Talk with your primary healthcare provider if you  feel unable to care for yourself or your baby.  Sexual activity:  You may need to avoid sex for 6 to 7 weeks after you have your baby. You may notice you have a decreased desire for sex, or sex may be painful. You may need to use a vaginal lubricant (gel) to help make sex more comfortable.  Contact your primary healthcare provider if:   You have heavy vaginal bleeding that fills 1 or more sanitary pads in 1 hour.    You have a fever.    Your pain does not go away, or gets worse.    The skin between your vagina and rectum is swollen, warm, or red.    You have swollen, hard, or painful breasts.    You feel very sad or depressed.    You feel more tired than usual.     You have questions or concerns about your condition or care.  Seek care immediately or call 911 if:   You have pus or yellow drainage coming from your vagina or wound.    You are urinating very little, or not at all.    Your arm or leg feels warm, tender, and painful. It may look swollen and red.    You feel lightheaded, have sudden and worsening chest pain, or trouble breathing. You may have more pain when you take deep breaths or cough, or you may cough up blood.  © 2014 [employer] Information is for End User's use only and may not be sold, redistributed or otherwise used for commercial purposes. All illustrations and images included in CareNotes® are the copyrighted property of [employer] or [employer].  The above information is an  only. It is not intended as medical advice for individual conditions or treatments. Talk to your doctor, nurse or pharmacist before following any medical regimen to see if it is safe and effective for you.

## 2025-02-13 NOTE — UTILIZATION REVIEW
"Mother and baby discharged on 2025         NOTIFICATION OF INPATIENT ADMISSION   MATERNITY/DELIVERY AUTHORIZATION REQUEST   SERVICING FACILITY:   [facility] - L&D, [place], NICU  [address] [place] [address]  Tax ID: [id number]  NPI: [id number]   ATTENDING PROVIDER:  Attending Name and NPI#: Nanci Choi Md [[id number]]  Address: [address] [place] [address]  Phone: [phone]   ADMISSION INFORMATION:  Place of Service: Inpatient [facility]  Place of Service Code: 21  Inpatient Admission Date/Time: 25  7:50 AM  Discharge Date/Time: 2025 10:15 AM  Admitting Diagnosis Code/Description:  Encounter for planned induction of labor [Z34.90]  41 weeks gestation of pregnancy [O48.0, Z3A.41]  Encounter for full-term uncomplicated delivery [O80]     Mother: Zhanna Torres 1990 Estimated Date of Delivery: 25  Delivering clinician: Nanci Choi   OB History          6    Para   6    Term   6       0    AB   0    Living   6         SAB   0    IAB   0    Ectopic   0    Multiple   0    Live Births   6                Name & MRN:   Information for the patient's :  Jose Raul Torres (Zhanna) [[id number]]    Delivery Information:  Sex: male  Delivered 2025 8:21 PM by Vaginal, Spontaneous; Gestational Age: 41w0d    [place] Measurements:  Weight: 8 lb 13.1 oz (4000 g);  Height: 21\"    APGAR 1 minute 5 minutes 10 minutes   Totals: 9 9       UTILIZATION REVIEW CONTACT:  Judi Collins Utilization   Network Utilization Review Department  Phone: [phone]  Fax [phone]  Email: [email or web address]  Contact for approvals/pending authorizations, clinical reviews, and discharge.     PHYSICIAN ADVISORY SERVICES:  Medical Necessity Denial & Awar-oh-Uqpw Review  Phone: [phone]  Fax: [phone]  Email: [email or web address]     DISCHARGE SUPPORT " TEAM:  For Patients Discharge Needs & Updates  Phone: [phone] opt. 2 Fax: [phone]  Email: [email or web address]

## 2025-02-13 NOTE — PLAN OF CARE
Problem: PAIN - ADULT  Goal: Verbalizes/displays adequate comfort level or baseline comfort level  Description: Interventions:  - Encourage patient to monitor pain and request assistance  - Assess pain using appropriate pain scale  - Administer analgesics based on type and severity of pain and evaluate response  - Implement non-pharmacological measures as appropriate and evaluate response  - Consider cultural and social influences on pain and pain management  - Notify physician/advanced practitioner if interventions unsuccessful or patient reports new pain  Outcome: Progressing     Problem: INFECTION - ADULT  Goal: Absence or prevention of progression during hospitalization  Description: INTERVENTIONS:  - Assess and monitor for signs and symptoms of infection  - Monitor lab/diagnostic results  - Monitor all insertion sites, i.e. indwelling lines, tubes, and drains  - Monitor endotracheal if appropriate and nasal secretions for changes in amount and color  - [place] appropriate cooling/warming therapies per order  - Administer medications as ordered  - Instruct and encourage patient and family to use good hand hygiene technique  - Identify and instruct in appropriate isolation precautions for identified infection/condition  Outcome: Progressing  Goal: Absence of fever/infection during neutropenic period  Description: INTERVENTIONS:  - Monitor WBC    Outcome: Progressing     Problem: SAFETY ADULT  Goal: Patient will remain free of falls  Description: INTERVENTIONS:  - Educate patient/family on patient safety including physical limitations  - Instruct patient to call for assistance with activity   - Consult OT/PT to assist with strengthening/mobility   - Keep Call bell within reach  - Keep bed low and locked with side rails adjusted as appropriate  - Keep care items and personal belongings within reach  - Initiate and maintain comfort rounds  - Make Fall Risk Sign visible to staff  - Offer Toileting every  Hours,  in advance of need  - Initiate/Maintain alarm  - Obtain necessary fall risk management equipment:   - Apply yellow socks and bracelet for high fall risk patients  - Consider moving patient to room near nurses station  Outcome: Progressing  Goal: Maintain or return to baseline ADL function  Description: INTERVENTIONS:  -  Assess patient's ability to carry out ADLs; assess patient's baseline for ADL function and identify physical deficits which impact ability to perform ADLs (bathing, care of mouth/teeth, toileting, grooming, dressing, etc.)  - Assess/evaluate cause of self-care deficits   - Assess range of motion  - Assess patient's mobility; develop plan if impaired  - Assess patient's need for assistive devices and provide as appropriate  - Encourage maximum independence but intervene and supervise when necessary  - Involve family in performance of ADLs  - Assess for home care needs following discharge   - Consider OT consult to assist with ADL evaluation and planning for discharge  - Provide patient education as appropriate  Outcome: Progressing  Goal: Maintains/Returns to pre admission functional level  Description: INTERVENTIONS:  - Perform AM-PAC 6 Click Basic Mobility/ Daily Activity assessment daily.  - Set and communicate daily mobility goal to care team and patient/family/caregiver.   - Collaborate with rehabilitation services on mobility goals if consulted  - Perform Range of Motion  times a day.  - Reposition patient every  hours.  - Dangle patient  times a day  - Stand patient  times a day  - Ambulate patient  times a day  - Out of bed to chair  times a day   - Out of bed for meals  times a day  - Out of bed for toileting  - Record patient progress and toleration of activity level   Outcome: Progressing     Problem: DISCHARGE PLANNING  Goal: Discharge to home or other facility with appropriate resources  Description: INTERVENTIONS:  - Identify barriers to discharge w/patient and caregiver  - Arrange for  needed discharge resources and transportation as appropriate  - Identify discharge learning needs (meds, wound care, etc.)  - Arrange for interpretive services to assist at discharge as needed  - Refer to Case Management Department for coordinating discharge planning if the patient needs post-hospital services based on physician/advanced practitioner order or complex needs related to functional status, cognitive ability, or social support system  Outcome: Progressing     Problem: POSTPARTUM  Goal: Experiences normal postpartum course  Description: INTERVENTIONS:  - Monitor maternal vital signs  - Assess uterine involution and lochia  Outcome: Progressing  Goal: Appropriate maternal -  bonding  Description: INTERVENTIONS:  - Identify family support  - Assess for appropriate maternal/infant bonding   -Encourage maternal/infant bonding opportunities  - Referral to  or  as needed  Outcome: Progressing  Goal: Establishment of infant feeding pattern  Description: INTERVENTIONS:  - Assess breast/bottle feeding  - Refer to lactation as needed  Outcome: Progressing  Goal: Incision(s), wounds(s) or drain site(s) healing without S/S of infection  Description: INTERVENTIONS  - Assess and document dressing, incision, wound bed, drain sites and surrounding tissue  - Provide patient and family education  - Perform skin care/dressing changes every   Outcome: Progressing

## 2025-02-14 ENCOUNTER — TRANSITIONAL CARE MANAGEMENT (OUTPATIENT)
Dept: INTERNAL MEDICINE CLINIC | Facility: CLINIC | Age: 35
End: 2025-02-14

## 2025-02-14 PROCEDURE — TCMXX

## 2025-02-18 LAB — PLACENTA IN STORAGE: NORMAL

## 2025-02-20 ENCOUNTER — TELEPHONE (OUTPATIENT)
Dept: OBGYN CLINIC | Facility: CLINIC | Age: 35
End: 2025-02-20

## 2025-02-20 NOTE — TELEPHONE ENCOUNTER
----- Message from Marj HILL sent at 2/12/2025  1:14 PM EST -----  Regarding: Pospartum Check In  delivered baby boy 2/11

## 2025-02-20 NOTE — TELEPHONE ENCOUNTER
"POSTPARTUM PHONE CALL ASSESSMENT    Date of Delivery:    Delivering Provider: SHELLY  Mode:     Delivery Notes/Complications: NONE   Do you still have bleeding/pain? If so, how much/how severe? \"It fine\"   Regular BMs/Urination? \"Going every day\"  Breastfeeding/Formula/Both? \"Baby is breastfeeding fine\"  How are you doing emotionally? \"I'm fine\"   If struggling, obtain a EPDS Score: 4  Do you have any other questions or concerns for us or your provider? no  Have you scheduled the pediatrician appointment with pediatrician? Yes twice  Do you have a postpartum visit scheduled? Yes   Date scheduled: 3/6 Provider: MELISSA      "

## 2025-02-21 NOTE — PROGRESS NOTES
CLARIFY DX RESPONSE NOTE    Iron deficiency anemia is on the patient's problem list and is the indication for the venofer that was ordered. Pt was having symptoms of fe def anemia which are documented in her notes and her labs were notable for an iron saturation of 9.

## 2025-03-06 ENCOUNTER — POSTPARTUM VISIT (OUTPATIENT)
Dept: OBGYN CLINIC | Facility: CLINIC | Age: 35
End: 2025-03-06

## 2025-03-06 VITALS — SYSTOLIC BLOOD PRESSURE: 102 MMHG | BODY MASS INDEX: 26.73 KG/M2 | DIASTOLIC BLOOD PRESSURE: 64 MMHG | WEIGHT: 160.6 LBS

## 2025-03-06 PROBLEM — H66.90 ACUTE OTITIS MEDIA: Status: RESOLVED | Noted: 2025-02-11 | Resolved: 2025-03-06

## 2025-03-06 PROBLEM — O47.9 UTERINE CONTRACTIONS: Status: RESOLVED | Noted: 2025-02-06 | Resolved: 2025-03-06

## 2025-03-06 PROBLEM — O99.019 IRON DEFICIENCY ANEMIA DURING PREGNANCY: Status: RESOLVED | Noted: 2023-05-11 | Resolved: 2025-03-06

## 2025-03-06 PROBLEM — O09.892 SHORT INTERVAL BETWEEN PREGNANCIES COMPLICATING PREGNANCY, ANTEPARTUM, SECOND TRIMESTER: Status: RESOLVED | Noted: 2024-09-19 | Resolved: 2025-03-06

## 2025-03-06 PROBLEM — R00.0 TACHYCARDIA: Status: RESOLVED | Noted: 2025-01-22 | Resolved: 2025-03-06

## 2025-03-06 PROBLEM — D50.9 IRON DEFICIENCY ANEMIA DURING PREGNANCY: Status: RESOLVED | Noted: 2023-05-11 | Resolved: 2025-03-06

## 2025-03-06 PROCEDURE — 99024 POSTOP FOLLOW-UP VISIT: CPT | Performed by: OBSTETRICS & GYNECOLOGY

## 2025-03-06 NOTE — ASSESSMENT & PLAN NOTE
EDPS reviewed  BF well   plans to get vasectomy  ELIZABETH improved s/p venofer  Recommend scheduling annual  Orders:  •  Ambulatory referral to Physical Therapy; Future

## 2025-03-06 NOTE — PROGRESS NOTES
Name: Zhanna Torres      : 1990      MRN: [id number]  Encounter Provider: Zhanna Mclain MD  Encounter Date: 3/6/2025   Encounter department: [facility] [place]  :  Assessment & Plan   (spontaneous vaginal delivery)  EDPS reviewed  BF well   plans to get vasectomy  ELIZABETH improved s/p venofer  Recommend scheduling annual  Orders:  •  Ambulatory referral to Physical Therapy; Future        History of Present Illness   HPI  Zhanna Torres is a 34 y.o. female who presents Patient is here for her postpartum visit    Delivered to term on 25 at 41wks.   Baby Hemant   Weight: 8lbs 13.1oz  Vaginal Delivery- w Dr. Choi -- no laceration  APGARS= 9/1 & 9/5  Patient is breastfeeding, going well  Bleeding has become mild  Not Interested in birth control- husbands getting a vasectomy   EPDS=4  Substance Abuse Screening=No risk  10/2023: Pap NILM, neg HPV        Review of Systems       Objective   /64 (BP Location: Right arm, Patient Position: Sitting, Cuff Size: Standard)   Wt 72.8 kg (160 lb 9.6 oz)   LMP 2024 (Exact Date)   Breastfeeding Yes   BMI 26.73 kg/m²      Physical Exam  Vitals and nursing note reviewed.   Constitutional:       General: She is not in acute distress.  Pulmonary:      Effort: Pulmonary effort is normal. No respiratory distress.   Skin:     General: Skin is warm and dry.   Neurological:      Mental Status: She is alert. Mental status is at baseline.     Declines breast exam

## 2025-04-25 ENCOUNTER — OFFICE VISIT (OUTPATIENT)
Dept: INTERNAL MEDICINE CLINIC | Facility: CLINIC | Age: 35
End: 2025-04-25
Payer: COMMERCIAL

## 2025-04-25 VITALS
DIASTOLIC BLOOD PRESSURE: 82 MMHG | OXYGEN SATURATION: 98 % | HEART RATE: 72 BPM | SYSTOLIC BLOOD PRESSURE: 112 MMHG | BODY MASS INDEX: 26.79 KG/M2 | HEIGHT: 65 IN | WEIGHT: 160.8 LBS

## 2025-04-25 DIAGNOSIS — Z13.1 SCREENING FOR DIABETES MELLITUS: ICD-10-CM

## 2025-04-25 DIAGNOSIS — Z13.6 SCREENING FOR CARDIOVASCULAR CONDITION: Primary | ICD-10-CM

## 2025-04-25 DIAGNOSIS — E78.49 OTHER HYPERLIPIDEMIA: ICD-10-CM

## 2025-04-25 DIAGNOSIS — R53.83 OTHER FATIGUE: ICD-10-CM

## 2025-04-25 DIAGNOSIS — H69.92 DYSFUNCTION OF LEFT EUSTACHIAN TUBE: ICD-10-CM

## 2025-04-25 DIAGNOSIS — Z13.0 SCREENING, ANEMIA, DEFICIENCY, IRON: ICD-10-CM

## 2025-04-25 DIAGNOSIS — J45.20 MILD INTERMITTENT ASTHMA WITHOUT COMPLICATION: ICD-10-CM

## 2025-04-25 DIAGNOSIS — Z00.00 WELLNESS EXAMINATION: ICD-10-CM

## 2025-04-25 PROBLEM — J30.2 SEASONAL ALLERGIES: Status: ACTIVE | Noted: 2025-04-25

## 2025-04-25 PROCEDURE — 99395 PREV VISIT EST AGE 18-39: CPT | Performed by: INTERNAL MEDICINE

## 2025-04-25 NOTE — PROGRESS NOTES
Adult Annual Physical  Name: Zhanna Torres      : 1990      MRN: [id number]  Encounter Provider: Danyel Phillip DO  Encounter Date: 2025   Enc[facility] department: [facility]    :  Assessment & Plan  Screening for cardiovascular condition    Orders:  •  Comprehensive metabolic panel; Future  •  Lipid Panel with Direct LDL reflex; Future    Screening for diabetes mellitus    Orders:  •  Hemoglobin A1C; Future    Other fatigue    Orders:  •  Vitamin D 25 hydroxy; Future  •  CBC (Includes Diff/Plt) (Refl); Future  •  Iron Panel (Includes Ferritin, Iron Sat%, Iron, and TIBC); Future  •  TSH, 3rd generation; Future    Screening, anemia, deficiency, iron    Orders:  •  CBC (Includes Diff/Plt) (Refl); Future  •  Iron Panel (Includes Ferritin, Iron Sat%, Iron, and TIBC); Future  •  TSH, 3rd generation; Future    Other hyperlipidemia  Check lipid panel       Wellness examination  Assessment and plan 1.  Health maintenance annual wellness examination overall the patient is clinically stable and doing well, we encouraged the patient to follow a healthy and balanced diet.  We recommend that the patient exercise routinely approximately 30 minutes 5 times per week .  We have reviewed the patient's vaccines and have made recommendations for updates if necessary recommend annual flu shot the fall    .  We will be ordering screening laboratories which are age appropriate.  Return to the office in   1 year   call if any problems.       Dysfunction of left eustachian tube  Mild left eustachian dysfunction and allergies use Flonase 2 spray each nose once a day between days Claritin 10 mg once a day as needed           Preventive Screenings:    - Cervical cancer screening: screening up-to-date     Immunizations:  - Immunizations due: Influenza and Prevnar 20         History of Present Illness  some swimming in the ear ear infection took aupgmentin and flonas e spring allergies  some barkey cough  "using rescue in hale 1-2 times seasonal allergies runny nose postnasal drip will be using Flonase and Claritin    Adult Annual Physical:  Patient presents for annual physical.     Diet and Physical Activity:  - Diet/Nutrition: well balanced diet.  - Exercise: moderate cardiovascular exercise.    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  - Vision: no vision problems and wears glasses. a little more blurry  - Dental: regular dental visits, brushes teeth twice daily and floss regularly.    /GYN Health:  - Follows with GYN: yes.   - Contraception:. vasectomy      Advanced Care Planning:  - Has an advanced directive?: yes    - Has a durable medical POA?: yes      Review of Systems   Constitutional:  Negative for activity change, appetite change and unexpected weight change.   HENT:  Negative for congestion and postnasal drip.    Eyes:  Negative for visual disturbance.   Respiratory:  Negative for cough and shortness of breath.    Cardiovascular:  Negative for chest pain.   Gastrointestinal:  Negative for abdominal pain, diarrhea, nausea and vomiting.   Neurological:  Negative for dizziness, light-headedness and headaches.   Hematological:  Negative for adenopathy.     Social History     Tobacco Use   • Smoking status: Never   • Smokeless tobacco: Never   Vaping Use   • Vaping status: Never Used   Substance and Sexual Activity   • Alcohol use: Not Currently     Comment: rare- BF   • Drug use: No     Comment: denies self or   denies family use   • Sexual activity: Yes     Partners: Male     Birth control/protection: None       Objective   /82 (BP Location: Left arm, Patient Position: Sitting, Cuff Size: Standard)   Pulse 72   Ht 5' 5\" (1.651 m)   Wt 72.9 kg (160 lb 12.8 oz)   SpO2 98%   BMI 26.76 kg/m²   Left tympanic membrane slightly retracted no erythema no bulging no perforation  Physical " Exam  Constitutional:       Appearance: She is well-developed.   HENT:      Head: Normocephalic and atraumatic.      Right Ear: External ear normal.      Left Ear: External ear normal.      Nose: Nose normal.   Eyes:      General: No scleral icterus.        Right eye: No discharge.         Left eye: No discharge.      Conjunctiva/sclera: Conjunctivae normal.      Pupils: Pupils are equal, round, and reactive to light.   Cardiovascular:      Rate and Rhythm: Normal rate and regular rhythm.      Heart sounds: Normal heart sounds. No murmur heard.     No friction rub. No gallop.   Pulmonary:      Effort: Pulmonary effort is normal. No respiratory distress.      Breath sounds: Normal breath sounds. No wheezing or rales.   Abdominal:      General: Bowel sounds are normal. There is no distension.      Palpations: Abdomen is soft. There is no mass.      Tenderness: There is no abdominal tenderness. There is no guarding or rebound.   Musculoskeletal:         General: No deformity.      Cervical back: Neck supple.   Lymphadenopathy:      Cervical: No cervical adenopathy.   Neurological:      Mental Status: She is alert.      Coordination: Coordination normal.     Negative edema

## 2025-04-27 NOTE — ASSESSMENT & PLAN NOTE
Assessment and plan 1.  Health maintenance annual wellness examination overall the patient is clinically stable and doing well, we encouraged the patient to follow a healthy and balanced diet.  We recommend that the patient exercise routinely approximately 30 minutes 5 times per week .  We have reviewed the patient's vaccines and have made recommendations for updates if necessary recommend annual flu shot the fall    .  We will be ordering screening laboratories which are age appropriate.  Return to the office in   1 year   call if any problems.

## 2025-04-27 NOTE — ASSESSMENT & PLAN NOTE
Mild left eustachian dysfunction and allergies use Flonase 2 spray each nose once a day between days Claritin 10 mg once a day as needed

## 2025-05-07 ENCOUNTER — RESULTS FOLLOW-UP (OUTPATIENT)
Dept: INTERNAL MEDICINE CLINIC | Facility: CLINIC | Age: 35
End: 2025-05-07

## 2025-05-07 ENCOUNTER — APPOINTMENT (OUTPATIENT)
Dept: LAB | Facility: CLINIC | Age: 35
End: 2025-05-07
Attending: PREVENTIVE MEDICINE
Payer: COMMERCIAL

## 2025-05-07 ENCOUNTER — APPOINTMENT (OUTPATIENT)
Dept: LAB | Facility: CLINIC | Age: 35
End: 2025-05-07
Attending: INTERNAL MEDICINE
Payer: COMMERCIAL

## 2025-05-07 DIAGNOSIS — R73.03 PREDIABETES: Primary | ICD-10-CM

## 2025-05-07 DIAGNOSIS — Z13.0 SCREENING, ANEMIA, DEFICIENCY, IRON: ICD-10-CM

## 2025-05-07 DIAGNOSIS — E78.5 HYPERLIPIDEMIA, UNSPECIFIED HYPERLIPIDEMIA TYPE: Primary | ICD-10-CM

## 2025-05-07 DIAGNOSIS — Z13.6 SCREENING FOR CARDIOVASCULAR CONDITION: ICD-10-CM

## 2025-05-07 DIAGNOSIS — R53.83 OTHER FATIGUE: ICD-10-CM

## 2025-05-07 DIAGNOSIS — Z13.1 SCREENING FOR DIABETES MELLITUS: ICD-10-CM

## 2025-05-07 DIAGNOSIS — Z00.8 HEALTH EXAMINATION IN POPULATION SURVEY: ICD-10-CM

## 2025-05-07 LAB
25(OH)D3 SERPL-MCNC: 26 NG/ML (ref 30–100)
ALBUMIN SERPL BCG-MCNC: 4.6 G/DL (ref 3.5–5)
ALP SERPL-CCNC: 64 U/L (ref 34–104)
ALT SERPL W P-5'-P-CCNC: 21 U/L (ref 7–52)
ANION GAP SERPL CALCULATED.3IONS-SCNC: 8 MMOL/L (ref 4–13)
AST SERPL W P-5'-P-CCNC: 17 U/L (ref 13–39)
BASOPHILS # BLD AUTO: 0.03 THOUSANDS/ÂΜL (ref 0–0.1)
BASOPHILS NFR BLD AUTO: 1 % (ref 0–1)
BILIRUB SERPL-MCNC: 0.57 MG/DL (ref 0.2–1)
BUN SERPL-MCNC: 16 MG/DL (ref 5–25)
CALCIUM SERPL-MCNC: 9.6 MG/DL (ref 8.4–10.2)
CHLORIDE SERPL-SCNC: 103 MMOL/L (ref 96–108)
CHOLEST SERPL-MCNC: 260 MG/DL (ref ?–200)
CO2 SERPL-SCNC: 27 MMOL/L (ref 21–32)
CREAT SERPL-MCNC: 0.55 MG/DL (ref 0.6–1.3)
EOSINOPHIL # BLD AUTO: 0.36 THOUSAND/ÂΜL (ref 0–0.61)
EOSINOPHIL NFR BLD AUTO: 7 % (ref 0–6)
ERYTHROCYTE [DISTWIDTH] IN BLOOD BY AUTOMATED COUNT: 14.8 % (ref 11.6–15.1)
EST. AVERAGE GLUCOSE BLD GHB EST-MCNC: 126 MG/DL
FERRITIN SERPL-MCNC: 136 NG/ML (ref 30–307)
GFR SERPL CREATININE-BSD FRML MDRD: 121 ML/MIN/1.73SQ M
GLUCOSE P FAST SERPL-MCNC: 84 MG/DL (ref 65–99)
HBA1C MFR BLD: 6 %
HCT VFR BLD AUTO: 41.6 % (ref 34.8–46.1)
HDLC SERPL-MCNC: 55 MG/DL
HGB BLD-MCNC: 13.7 G/DL (ref 11.5–15.4)
IMM GRANULOCYTES # BLD AUTO: 0.01 THOUSAND/UL (ref 0–0.2)
IMM GRANULOCYTES NFR BLD AUTO: 0 % (ref 0–2)
IRON SATN MFR SERPL: 27 % (ref 15–50)
IRON SERPL-MCNC: 81 UG/DL (ref 50–212)
LDLC SERPL CALC-MCNC: 186 MG/DL (ref 0–100)
LYMPHOCYTES # BLD AUTO: 2.15 THOUSANDS/ÂΜL (ref 0.6–4.47)
LYMPHOCYTES NFR BLD AUTO: 41 % (ref 14–44)
MCH RBC QN AUTO: 28.7 PG (ref 26.8–34.3)
MCHC RBC AUTO-ENTMCNC: 32.9 G/DL (ref 31.4–37.4)
MCV RBC AUTO: 87 FL (ref 82–98)
MONOCYTES # BLD AUTO: 0.55 THOUSAND/ÂΜL (ref 0.17–1.22)
MONOCYTES NFR BLD AUTO: 11 % (ref 4–12)
NEUTROPHILS # BLD AUTO: 2.05 THOUSANDS/ÂΜL (ref 1.85–7.62)
NEUTS SEG NFR BLD AUTO: 40 % (ref 43–75)
NRBC BLD AUTO-RTO: 0 /100 WBCS
PLATELET # BLD AUTO: 264 THOUSANDS/UL (ref 149–390)
PMV BLD AUTO: 10.3 FL (ref 8.9–12.7)
POTASSIUM SERPL-SCNC: 3.9 MMOL/L (ref 3.5–5.3)
PROT SERPL-MCNC: 7.3 G/DL (ref 6.4–8.4)
RBC # BLD AUTO: 4.78 MILLION/UL (ref 3.81–5.12)
SODIUM SERPL-SCNC: 138 MMOL/L (ref 135–147)
TIBC SERPL-MCNC: 301 UG/DL (ref 250–450)
TRANSFERRIN SERPL-MCNC: 215 MG/DL (ref 203–362)
TRIGL SERPL-MCNC: 95 MG/DL (ref ?–150)
TSH SERPL DL<=0.05 MIU/L-ACNC: 3.42 UIU/ML (ref 0.45–4.5)
UIBC SERPL-MCNC: 220 UG/DL (ref 155–355)
WBC # BLD AUTO: 5.15 THOUSAND/UL (ref 4.31–10.16)

## 2025-05-07 PROCEDURE — 83540 ASSAY OF IRON: CPT

## 2025-05-07 PROCEDURE — 36415 COLL VENOUS BLD VENIPUNCTURE: CPT

## 2025-05-07 PROCEDURE — 80061 LIPID PANEL: CPT

## 2025-05-07 PROCEDURE — 83036 HEMOGLOBIN GLYCOSYLATED A1C: CPT

## 2025-05-07 PROCEDURE — 85025 COMPLETE CBC W/AUTO DIFF WBC: CPT

## 2025-05-07 PROCEDURE — 84443 ASSAY THYROID STIM HORMONE: CPT

## 2025-05-07 PROCEDURE — 82728 ASSAY OF FERRITIN: CPT

## 2025-05-07 PROCEDURE — 80053 COMPREHEN METABOLIC PANEL: CPT

## 2025-05-07 PROCEDURE — 83550 IRON BINDING TEST: CPT

## 2025-05-07 PROCEDURE — 82306 VITAMIN D 25 HYDROXY: CPT

## 2025-05-15 ENCOUNTER — ANNUAL EXAM (OUTPATIENT)
Dept: OBGYN CLINIC | Facility: CLINIC | Age: 35
End: 2025-05-15
Payer: COMMERCIAL

## 2025-05-15 DIAGNOSIS — Z01.419 ENCOUNTER FOR GYNECOLOGICAL EXAMINATION WITHOUT ABNORMAL FINDING: Primary | ICD-10-CM

## 2025-05-15 PROBLEM — R22.31 MASS OF RIGHT AXILLA: Status: RESOLVED | Noted: 2019-08-13 | Resolved: 2025-05-15

## 2025-05-15 PROBLEM — Z3A.40 40 WEEKS GESTATION OF PREGNANCY: Status: RESOLVED | Noted: 2024-08-13 | Resolved: 2025-05-15

## 2025-05-15 PROBLEM — I88.9 ADENITIS: Status: RESOLVED | Noted: 2019-08-13 | Resolved: 2025-05-15

## 2025-05-15 PROCEDURE — S0612 ANNUAL GYNECOLOGICAL EXAMINA: HCPCS | Performed by: OBSTETRICS & GYNECOLOGY

## 2025-05-15 RX ORDER — CHOLECALCIFEROL (VITAMIN D3) 50 MCG
2000 TABLET ORAL DAILY
COMMUNITY

## 2025-05-15 NOTE — PROGRESS NOTES
Zhanna Torres  1990      CC:  Yearly exam    S:  34 y.o. female here for yearly exam. Her cycles are absent while breastfeeding since her delivery in February 2025.  She has had a few episodes of spotting but nothing unexpected.  She did resume menses while breastfeeding with her first 3 so discussed expectations.     EPDS = 2    Sexual activity: She is sexually active without pain, bleeding or dryness.     Contraception: She uses withdrawal for contraception. Her  will plan for vasectomy.    Last Pap 10/26/2023 - normal/negative HPV  Last Mammo - Diagnostic 8/6/2021 - BIRAD-2; repeat at age 38    We reviewed ASCCP guidelines for Pap testing today.       Current Medications[1]  Social History     Socioeconomic History    Marital status: /Civil Union     Spouse name: Not on file    Number of children: Not on file    Years of education: Not on file    Highest education level: Not on file   Occupational History    Occupation: Physician Assistant     Comment: [employer]   Tobacco Use    Smoking status: Never    Smokeless tobacco: Never   Vaping Use    Vaping status: Never Used   Substance and Sexual Activity    Alcohol use: Not Currently     Comment: rare- BF    Drug use: No     Comment: denies self or   denies family use    Sexual activity: Yes     Partners: Male     Birth control/protection: None   Other Topics Concern    Not on file   Social History Narrative    Not on file     Social Drivers of Health     Financial Resource Strain: Not on file   Food Insecurity: No Food Insecurity (4/25/2025)    Nursing - Inadequate Food Risk Classification     Worried About Running Out of Food in the Last Year: Never true     Ran Out of Food in the Last Year: Never true     Ran Out of Food in the Last Year: Never true   Transportation Needs: No Transportation Needs (4/25/2025)    PRAPARE - Transportation     Lack of Transportation (Medical): No     Lack of Transportation  (Non-Medical): No   Physical Activity: Not on file   Stress: Not on file   Social Connections: Not on file   Intimate Partner Violence: Unknown (2/11/2025)    Nursing IPS     Feels Physically and Emotionally Safe: Not on file     Physically Hurt by Someone: Not on file     Humiliated or Emotionally Abused by Someone: Not on file     Physically Hurt by Someone: No     Hurt or Threatened by Someone: No   Housing Stability: Low Risk  (4/25/2025)    Housing Stability Vital Sign     Unable to Pay for Housing in the Last Year: No     Number of Times Moved in the Last Year: 0     Homeless in the Last Year: No     Family History   Problem Relation Age of Onset    Diabetes Mother     Hypertension Mother     Uterine cancer Mother 50    Hypothyroidism Mother     Ankylosing spondylitis Mother     Asthma Mother     Cancer Mother         Uterine    Thyroid disease Mother     Cancer Father         Prostate    Prostate cancer Father 58    No Known Problems Sister     No Known Problems Sister     No Known Problems Daughter     No Known Problems Daughter     No Known Problems Daughter     No Known Problems Daughter     No Known Problems Daughter     Diabetes Maternal Grandmother     Stroke Maternal Grandmother     Cancer Maternal Grandfather         lung    Lung cancer Maternal Grandfather     Diabetes Maternal Grandfather     Heart disease Paternal Grandfather     Kidney failure Paternal Aunt     No Known Problems Half-Brother     Breast cancer Neg Hx     Colon cancer Neg Hx     Ovarian cancer Neg Hx     Cervical cancer Neg Hx       Past Medical History:   Diagnosis Date    Anemia     2018, and 2023 (needed iron infusions)    Anorexia     resolved    Anxiety     pregnancy related    Asthma     Headache(784.0)     Migraine     As Child/Resolved    Normal delivery     2013 daughter, 2014 daughter, 2016 daughter    PONV (postoperative nausea and vomiting)     with wisdom teeth    Varicella     As child        Review of Systems    Respiratory: Negative.    Cardiovascular: Negative.    Gastrointestinal: Negative for constipation and diarrhea.   Genitourinary: Negative for difficulty urinating, pelvic pain, vaginal bleeding, vaginal discharge, itching or odor.    O:  currently breastfeeding.    Patient appears well and is not in distress  Neck is supple without masses  Breasts are symmetrical without mass, tenderness, nipple discharge, skin changes or adenopathy.   Abdomen is soft and nontender without masses.   External genitals are normal without lesions or rashes.  Urethral meatus and urethra are normal  Bladder is normal to palpation  Vagina is normal without discharge or bleeding.   Cervix is normal without discharge or lesion.   Uterus is normal, mobile, nontender without palpable mass.  Adnexa are normal, nontender, without palpable mass.     A:   Yearly exam.     P:   Pap due 2028   Mammo at age 38    RTO one year for yearly exam or sooner as needed.          [1]   Current Outpatient Medications:     albuterol (ProAir HFA) 90 mcg/act inhaler, Inhale 2 puffs every 6 (six) hours as needed for wheezing, Disp: 1 g, Rfl: 0    Prenatal Vit-Fe Fumarate-FA (PRENATAL VITAMINS PO), Take by mouth, Disp: , Rfl:

## 2025-07-12 NOTE — TELEPHONE ENCOUNTER
Spoke with patient [facility] infusion center is preference- plan amended- waiting for sign off       Patient can do any time on thurs/fri Unsuccessful PIV attempt x2 by ED tech.